# Patient Record
Sex: FEMALE | Race: WHITE | NOT HISPANIC OR LATINO | Employment: OTHER | URBAN - METROPOLITAN AREA
[De-identification: names, ages, dates, MRNs, and addresses within clinical notes are randomized per-mention and may not be internally consistent; named-entity substitution may affect disease eponyms.]

---

## 2017-12-07 ENCOUNTER — OFFICE VISIT - HEALTHEAST (OUTPATIENT)
Dept: FAMILY MEDICINE | Facility: CLINIC | Age: 57
End: 2017-12-07

## 2017-12-07 DIAGNOSIS — G89.29 OTHER CHRONIC PAIN: ICD-10-CM

## 2017-12-07 DIAGNOSIS — R30.0 DYSURIA: ICD-10-CM

## 2017-12-07 DIAGNOSIS — E16.2 HYPOGLYCEMIA: ICD-10-CM

## 2017-12-07 DIAGNOSIS — N76.0 ACUTE VAGINITIS: ICD-10-CM

## 2017-12-07 DIAGNOSIS — R81 GLUCOSURIA: ICD-10-CM

## 2017-12-07 DIAGNOSIS — F22 DELUSIONAL DISORDER (H): ICD-10-CM

## 2017-12-07 DIAGNOSIS — Z79.899 MEDICATION MANAGEMENT: ICD-10-CM

## 2017-12-07 DIAGNOSIS — E11.9 TYPE 2 DIABETES MELLITUS (H): ICD-10-CM

## 2017-12-07 DIAGNOSIS — Z13.220 LIPID SCREENING: ICD-10-CM

## 2017-12-07 DIAGNOSIS — B37.31 YEAST VAGINITIS: ICD-10-CM

## 2017-12-07 LAB
CHOLEST SERPL-MCNC: 306 MG/DL
FASTING STATUS PATIENT QL REPORTED: ABNORMAL
HBA1C MFR BLD: 12.2 % (ref 3.5–6)
HDLC SERPL-MCNC: 40 MG/DL
LDLC SERPL CALC-MCNC: 222 MG/DL
LDLC SERPL CALC-MCNC: ABNORMAL MG/DL
TRIGL SERPL-MCNC: 416 MG/DL

## 2017-12-07 ASSESSMENT — MIFFLIN-ST. JEOR: SCORE: 1606

## 2017-12-17 ENCOUNTER — AMBULATORY - HEALTHEAST (OUTPATIENT)
Dept: FAMILY MEDICINE | Facility: CLINIC | Age: 57
End: 2017-12-17

## 2017-12-17 DIAGNOSIS — E11.9 TYPE 2 DIABETES MELLITUS (H): ICD-10-CM

## 2017-12-17 DIAGNOSIS — E78.2 MIXED HYPERLIPIDEMIA: ICD-10-CM

## 2018-01-02 ENCOUNTER — AMBULATORY - HEALTHEAST (OUTPATIENT)
Dept: EDUCATION SERVICES | Facility: CLINIC | Age: 58
End: 2018-01-02

## 2018-01-02 DIAGNOSIS — E11.65 CONTROLLED TYPE 2 DIABETES MELLITUS WITH HYPERGLYCEMIA, WITHOUT LONG-TERM CURRENT USE OF INSULIN (H): ICD-10-CM

## 2018-02-06 ENCOUNTER — AMBULATORY - HEALTHEAST (OUTPATIENT)
Dept: EDUCATION SERVICES | Facility: CLINIC | Age: 58
End: 2018-02-06

## 2018-02-06 DIAGNOSIS — E11.65 UNCONTROLLED TYPE 2 DIABETES MELLITUS WITH HYPERGLYCEMIA, WITHOUT LONG-TERM CURRENT USE OF INSULIN (H): ICD-10-CM

## 2018-02-08 ENCOUNTER — COMMUNICATION - HEALTHEAST (OUTPATIENT)
Dept: FAMILY MEDICINE | Facility: CLINIC | Age: 58
End: 2018-02-08

## 2018-02-09 ENCOUNTER — COMMUNICATION - HEALTHEAST (OUTPATIENT)
Dept: SCHEDULING | Facility: CLINIC | Age: 58
End: 2018-02-09

## 2018-02-17 ENCOUNTER — COMMUNICATION - HEALTHEAST (OUTPATIENT)
Dept: FAMILY MEDICINE | Facility: CLINIC | Age: 58
End: 2018-02-17

## 2018-02-17 DIAGNOSIS — E11.9 TYPE 2 DIABETES MELLITUS (H): ICD-10-CM

## 2018-03-13 ENCOUNTER — OFFICE VISIT - HEALTHEAST (OUTPATIENT)
Dept: FAMILY MEDICINE | Facility: CLINIC | Age: 58
End: 2018-03-13

## 2018-03-13 ENCOUNTER — COMMUNICATION - HEALTHEAST (OUTPATIENT)
Dept: FAMILY MEDICINE | Facility: CLINIC | Age: 58
End: 2018-03-13

## 2018-03-13 DIAGNOSIS — E11.9 TYPE 2 DIABETES MELLITUS (H): ICD-10-CM

## 2018-03-13 DIAGNOSIS — F22 DELUSIONAL DISORDER (H): ICD-10-CM

## 2018-03-13 DIAGNOSIS — G89.4 CHRONIC PAIN SYNDROME: ICD-10-CM

## 2018-03-13 LAB
ALBUMIN SERPL-MCNC: 4.2 G/DL (ref 3.5–5)
ALP SERPL-CCNC: 74 U/L (ref 45–120)
ALT SERPL W P-5'-P-CCNC: 15 U/L (ref 0–45)
ANION GAP SERPL CALCULATED.3IONS-SCNC: 14 MMOL/L (ref 5–18)
AST SERPL W P-5'-P-CCNC: 15 U/L (ref 0–40)
BILIRUB SERPL-MCNC: 0.4 MG/DL (ref 0–1)
BUN SERPL-MCNC: 14 MG/DL (ref 8–22)
CALCIUM SERPL-MCNC: 10.2 MG/DL (ref 8.5–10.5)
CHLORIDE BLD-SCNC: 105 MMOL/L (ref 98–107)
CO2 SERPL-SCNC: 21 MMOL/L (ref 22–31)
CREAT SERPL-MCNC: 0.75 MG/DL (ref 0.6–1.1)
GFR SERPL CREATININE-BSD FRML MDRD: >60 ML/MIN/1.73M2
GLUCOSE BLD-MCNC: 151 MG/DL (ref 70–125)
HBA1C MFR BLD: 8.4 % (ref 3.5–6)
POTASSIUM BLD-SCNC: 4.7 MMOL/L (ref 3.5–5)
PROT SERPL-MCNC: 7.5 G/DL (ref 6–8)
SODIUM SERPL-SCNC: 140 MMOL/L (ref 136–145)

## 2018-03-13 ASSESSMENT — MIFFLIN-ST. JEOR: SCORE: 1569.71

## 2018-03-16 ENCOUNTER — COMMUNICATION - HEALTHEAST (OUTPATIENT)
Dept: FAMILY MEDICINE | Facility: CLINIC | Age: 58
End: 2018-03-16

## 2018-03-16 DIAGNOSIS — E11.9 TYPE 2 DIABETES MELLITUS (H): ICD-10-CM

## 2018-04-13 ENCOUNTER — RECORDS - HEALTHEAST (OUTPATIENT)
Dept: ADMINISTRATIVE | Facility: OTHER | Age: 58
End: 2018-04-13

## 2018-05-17 ENCOUNTER — COMMUNICATION - HEALTHEAST (OUTPATIENT)
Dept: FAMILY MEDICINE | Facility: CLINIC | Age: 58
End: 2018-05-17

## 2018-05-19 ENCOUNTER — COMMUNICATION - HEALTHEAST (OUTPATIENT)
Dept: FAMILY MEDICINE | Facility: CLINIC | Age: 58
End: 2018-05-19

## 2018-05-19 DIAGNOSIS — E11.9 TYPE 2 DIABETES MELLITUS (H): ICD-10-CM

## 2018-07-05 ENCOUNTER — COMMUNICATION - HEALTHEAST (OUTPATIENT)
Dept: FAMILY MEDICINE | Facility: CLINIC | Age: 58
End: 2018-07-05

## 2018-07-27 ENCOUNTER — COMMUNICATION - HEALTHEAST (OUTPATIENT)
Dept: FAMILY MEDICINE | Facility: CLINIC | Age: 58
End: 2018-07-27

## 2018-07-27 DIAGNOSIS — E11.9 TYPE 2 DIABETES MELLITUS (H): ICD-10-CM

## 2018-09-06 ENCOUNTER — OFFICE VISIT - HEALTHEAST (OUTPATIENT)
Dept: FAMILY MEDICINE | Facility: CLINIC | Age: 58
End: 2018-09-06

## 2018-09-06 ENCOUNTER — COMMUNICATION - HEALTHEAST (OUTPATIENT)
Dept: FAMILY MEDICINE | Facility: CLINIC | Age: 58
End: 2018-09-06

## 2018-09-06 DIAGNOSIS — Z12.11 COLON CANCER SCREENING: ICD-10-CM

## 2018-09-06 DIAGNOSIS — E66.01 MORBID OBESITY (H): ICD-10-CM

## 2018-09-06 DIAGNOSIS — E78.2 MIXED HYPERLIPIDEMIA: ICD-10-CM

## 2018-09-06 DIAGNOSIS — E11.9 DIABETES MELLITUS, TYPE 2 (H): ICD-10-CM

## 2018-09-06 DIAGNOSIS — Z12.31 VISIT FOR SCREENING MAMMOGRAM: ICD-10-CM

## 2018-09-06 LAB — HBA1C MFR BLD: 8 % (ref 3.5–6)

## 2018-09-06 ASSESSMENT — MIFFLIN-ST. JEOR: SCORE: 1570.62

## 2018-09-07 ENCOUNTER — COMMUNICATION - HEALTHEAST (OUTPATIENT)
Dept: FAMILY MEDICINE | Facility: CLINIC | Age: 58
End: 2018-09-07

## 2018-09-10 ENCOUNTER — AMBULATORY - HEALTHEAST (OUTPATIENT)
Dept: FAMILY MEDICINE | Facility: CLINIC | Age: 58
End: 2018-09-10

## 2018-09-10 ENCOUNTER — COMMUNICATION - HEALTHEAST (OUTPATIENT)
Dept: EDUCATION SERVICES | Facility: CLINIC | Age: 58
End: 2018-09-10

## 2018-10-02 ENCOUNTER — COMMUNICATION - HEALTHEAST (OUTPATIENT)
Dept: FAMILY MEDICINE | Facility: CLINIC | Age: 58
End: 2018-10-02

## 2018-10-12 ENCOUNTER — COMMUNICATION - HEALTHEAST (OUTPATIENT)
Dept: FAMILY MEDICINE | Facility: CLINIC | Age: 58
End: 2018-10-12

## 2018-10-23 ENCOUNTER — COMMUNICATION - HEALTHEAST (OUTPATIENT)
Dept: FAMILY MEDICINE | Facility: CLINIC | Age: 58
End: 2018-10-23

## 2018-10-30 ENCOUNTER — AMBULATORY - HEALTHEAST (OUTPATIENT)
Dept: EDUCATION SERVICES | Facility: CLINIC | Age: 58
End: 2018-10-30

## 2018-10-30 DIAGNOSIS — E11.65 UNCONTROLLED TYPE 2 DIABETES MELLITUS WITH HYPERGLYCEMIA, WITHOUT LONG-TERM CURRENT USE OF INSULIN (H): ICD-10-CM

## 2018-12-06 ENCOUNTER — COMMUNICATION - HEALTHEAST (OUTPATIENT)
Dept: FAMILY MEDICINE | Facility: CLINIC | Age: 58
End: 2018-12-06

## 2018-12-06 DIAGNOSIS — E11.9 DIABETES MELLITUS, TYPE 2 (H): ICD-10-CM

## 2018-12-12 ENCOUNTER — OFFICE VISIT - HEALTHEAST (OUTPATIENT)
Dept: FAMILY MEDICINE | Facility: CLINIC | Age: 58
End: 2018-12-12

## 2018-12-12 DIAGNOSIS — E66.01 MORBID OBESITY (H): ICD-10-CM

## 2018-12-12 DIAGNOSIS — G89.4 CHRONIC PAIN SYNDROME: ICD-10-CM

## 2018-12-12 DIAGNOSIS — E11.9 TYPE 2 DIABETES MELLITUS (H): ICD-10-CM

## 2018-12-12 LAB — HBA1C MFR BLD: 7.6 % (ref 3.5–6)

## 2018-12-12 ASSESSMENT — MIFFLIN-ST. JEOR: SCORE: 1578.78

## 2018-12-13 LAB
ALBUMIN SERPL-MCNC: 4.8 G/DL (ref 3.5–5)
ALP SERPL-CCNC: 72 U/L (ref 45–120)
ALT SERPL W P-5'-P-CCNC: 15 U/L (ref 0–45)
ANION GAP SERPL CALCULATED.3IONS-SCNC: 13 MMOL/L (ref 5–18)
AST SERPL W P-5'-P-CCNC: 17 U/L (ref 0–40)
BILIRUB SERPL-MCNC: 0.3 MG/DL (ref 0–1)
BUN SERPL-MCNC: 14 MG/DL (ref 8–22)
CALCIUM SERPL-MCNC: 10.8 MG/DL (ref 8.5–10.5)
CHLORIDE BLD-SCNC: 100 MMOL/L (ref 98–107)
CO2 SERPL-SCNC: 22 MMOL/L (ref 22–31)
CREAT SERPL-MCNC: 0.84 MG/DL (ref 0.6–1.1)
GFR SERPL CREATININE-BSD FRML MDRD: >60 ML/MIN/1.73M2
GLUCOSE BLD-MCNC: 132 MG/DL (ref 70–125)
LDLC SERPL CALC-MCNC: 204 MG/DL
POTASSIUM BLD-SCNC: 5.3 MMOL/L (ref 3.5–5)
PROT SERPL-MCNC: 7.9 G/DL (ref 6–8)
SODIUM SERPL-SCNC: 135 MMOL/L (ref 136–145)

## 2018-12-20 ENCOUNTER — AMBULATORY - HEALTHEAST (OUTPATIENT)
Dept: FAMILY MEDICINE | Facility: CLINIC | Age: 58
End: 2018-12-20

## 2018-12-20 DIAGNOSIS — E11.9 TYPE 2 DIABETES MELLITUS (H): ICD-10-CM

## 2019-01-09 ENCOUNTER — COMMUNICATION - HEALTHEAST (OUTPATIENT)
Dept: FAMILY MEDICINE | Facility: CLINIC | Age: 59
End: 2019-01-09

## 2019-04-08 ENCOUNTER — COMMUNICATION - HEALTHEAST (OUTPATIENT)
Dept: FAMILY MEDICINE | Facility: CLINIC | Age: 59
End: 2019-04-08

## 2019-04-08 DIAGNOSIS — M47.816 SPONDYLOSIS OF LUMBAR REGION WITHOUT MYELOPATHY OR RADICULOPATHY: ICD-10-CM

## 2019-04-21 ENCOUNTER — COMMUNICATION - HEALTHEAST (OUTPATIENT)
Dept: FAMILY MEDICINE | Facility: CLINIC | Age: 59
End: 2019-04-21

## 2019-04-21 DIAGNOSIS — M47.816 SPONDYLOSIS OF LUMBAR REGION WITHOUT MYELOPATHY OR RADICULOPATHY: ICD-10-CM

## 2019-05-23 ENCOUNTER — COMMUNICATION - HEALTHEAST (OUTPATIENT)
Dept: FAMILY MEDICINE | Facility: CLINIC | Age: 59
End: 2019-05-23

## 2019-06-04 ENCOUNTER — COMMUNICATION - HEALTHEAST (OUTPATIENT)
Dept: FAMILY MEDICINE | Facility: CLINIC | Age: 59
End: 2019-06-04

## 2019-06-04 DIAGNOSIS — E11.9 TYPE 2 DIABETES MELLITUS (H): ICD-10-CM

## 2019-06-26 ENCOUNTER — OFFICE VISIT - HEALTHEAST (OUTPATIENT)
Dept: FAMILY MEDICINE | Facility: CLINIC | Age: 59
End: 2019-06-26

## 2019-06-26 DIAGNOSIS — F22 DELUSIONAL DISORDER (H): ICD-10-CM

## 2019-06-26 DIAGNOSIS — E78.2 MIXED HYPERLIPIDEMIA: ICD-10-CM

## 2019-06-26 DIAGNOSIS — R51.9 CHRONIC DAILY HEADACHE: ICD-10-CM

## 2019-06-26 DIAGNOSIS — G89.4 CHRONIC PAIN SYNDROME: ICD-10-CM

## 2019-06-26 DIAGNOSIS — E11.8 TYPE 2 DIABETES MELLITUS WITH COMPLICATION, WITHOUT LONG-TERM CURRENT USE OF INSULIN (H): ICD-10-CM

## 2019-06-26 DIAGNOSIS — E66.01 MORBID OBESITY (H): ICD-10-CM

## 2019-06-26 LAB
ANION GAP SERPL CALCULATED.3IONS-SCNC: 12 MMOL/L (ref 5–18)
BUN SERPL-MCNC: 17 MG/DL (ref 8–22)
CALCIUM SERPL-MCNC: 10.6 MG/DL (ref 8.5–10.5)
CHLORIDE BLD-SCNC: 104 MMOL/L (ref 98–107)
CO2 SERPL-SCNC: 21 MMOL/L (ref 22–31)
CREAT SERPL-MCNC: 0.82 MG/DL (ref 0.6–1.1)
CREAT UR-MCNC: 97.4 MG/DL
GFR SERPL CREATININE-BSD FRML MDRD: >60 ML/MIN/1.73M2
GLUCOSE BLD-MCNC: 171 MG/DL (ref 70–125)
HBA1C MFR BLD: 8 % (ref 3.5–6)
LDLC SERPL CALC-MCNC: 214 MG/DL
MICROALBUMIN UR-MCNC: 9.01 MG/DL (ref 0–1.99)
MICROALBUMIN/CREAT UR: 92.5 MG/G
POTASSIUM BLD-SCNC: 4.9 MMOL/L (ref 3.5–5)
SODIUM SERPL-SCNC: 137 MMOL/L (ref 136–145)

## 2019-06-26 ASSESSMENT — MIFFLIN-ST. JEOR: SCORE: 1558.82

## 2019-06-30 ENCOUNTER — AMBULATORY - HEALTHEAST (OUTPATIENT)
Dept: FAMILY MEDICINE | Facility: CLINIC | Age: 59
End: 2019-06-30

## 2019-06-30 DIAGNOSIS — E11.9 TYPE 2 DIABETES MELLITUS (H): ICD-10-CM

## 2019-07-01 ENCOUNTER — COMMUNICATION - HEALTHEAST (OUTPATIENT)
Dept: FAMILY MEDICINE | Facility: CLINIC | Age: 59
End: 2019-07-01

## 2019-09-26 ENCOUNTER — COMMUNICATION - HEALTHEAST (OUTPATIENT)
Dept: FAMILY MEDICINE | Facility: CLINIC | Age: 59
End: 2019-09-26

## 2019-10-30 ASSESSMENT — MIFFLIN-ST. JEOR: SCORE: 1587.4

## 2019-10-31 ENCOUNTER — SURGERY - HEALTHEAST (OUTPATIENT)
Dept: CARDIOLOGY | Facility: CLINIC | Age: 59
End: 2019-10-31

## 2019-10-31 ASSESSMENT — MIFFLIN-ST. JEOR: SCORE: 1537.7

## 2019-11-01 ASSESSMENT — MIFFLIN-ST. JEOR: SCORE: 1544.31

## 2019-11-04 ENCOUNTER — COMMUNICATION - HEALTHEAST (OUTPATIENT)
Dept: CARE COORDINATION | Facility: CLINIC | Age: 59
End: 2019-11-04

## 2019-11-04 ENCOUNTER — OFFICE VISIT - HEALTHEAST (OUTPATIENT)
Dept: FAMILY MEDICINE | Facility: CLINIC | Age: 59
End: 2019-11-04

## 2019-11-04 DIAGNOSIS — E78.2 MIXED HYPERLIPIDEMIA: ICD-10-CM

## 2019-11-04 DIAGNOSIS — E11.8 TYPE 2 DIABETES MELLITUS WITH COMPLICATION, WITHOUT LONG-TERM CURRENT USE OF INSULIN (H): ICD-10-CM

## 2019-11-04 DIAGNOSIS — Z72.0 TOBACCO USE: ICD-10-CM

## 2019-11-04 DIAGNOSIS — I50.9 ACUTE CONGESTIVE HEART FAILURE, UNSPECIFIED HEART FAILURE TYPE (H): ICD-10-CM

## 2019-11-04 DIAGNOSIS — I21.4 NSTEMI (NON-ST ELEVATED MYOCARDIAL INFARCTION) (H): ICD-10-CM

## 2019-11-04 DIAGNOSIS — Z09 HOSPITAL DISCHARGE FOLLOW-UP: ICD-10-CM

## 2019-11-07 ENCOUNTER — COMMUNICATION - HEALTHEAST (OUTPATIENT)
Dept: FAMILY MEDICINE | Facility: CLINIC | Age: 59
End: 2019-11-07

## 2019-11-07 DIAGNOSIS — M47.816 SPONDYLOSIS OF LUMBAR REGION WITHOUT MYELOPATHY OR RADICULOPATHY: ICD-10-CM

## 2019-11-14 ENCOUNTER — OFFICE VISIT - HEALTHEAST (OUTPATIENT)
Dept: CARDIOLOGY | Facility: CLINIC | Age: 59
End: 2019-11-14

## 2019-11-14 DIAGNOSIS — I25.5 ISCHEMIC CARDIOMYOPATHY: ICD-10-CM

## 2019-11-14 DIAGNOSIS — I21.4 NON-STEMI (NON-ST ELEVATED MYOCARDIAL INFARCTION) (H): ICD-10-CM

## 2019-11-14 DIAGNOSIS — E78.2 MIXED HYPERLIPIDEMIA: ICD-10-CM

## 2019-11-14 DIAGNOSIS — Z72.0 TOBACCO USE: ICD-10-CM

## 2019-11-14 LAB
ANION GAP SERPL CALCULATED.3IONS-SCNC: 11 MMOL/L (ref 5–18)
BUN SERPL-MCNC: 23 MG/DL (ref 8–22)
CALCIUM SERPL-MCNC: 10.1 MG/DL (ref 8.5–10.5)
CHLORIDE BLD-SCNC: 103 MMOL/L (ref 98–107)
CO2 SERPL-SCNC: 24 MMOL/L (ref 22–31)
CREAT SERPL-MCNC: 0.96 MG/DL (ref 0.6–1.1)
GFR SERPL CREATININE-BSD FRML MDRD: 60 ML/MIN/1.73M2
GLUCOSE BLD-MCNC: 196 MG/DL (ref 70–125)
POTASSIUM BLD-SCNC: 4.3 MMOL/L (ref 3.5–5)
SODIUM SERPL-SCNC: 138 MMOL/L (ref 136–145)

## 2019-11-14 RX ORDER — NITROGLYCERIN 0.4 MG/1
0.4 TABLET SUBLINGUAL EVERY 5 MIN PRN
Qty: 25 TABLET | Refills: 1 | Status: SHIPPED | OUTPATIENT
Start: 2019-11-14 | End: 2021-09-30

## 2019-11-14 ASSESSMENT — MIFFLIN-ST. JEOR: SCORE: 1528.43

## 2019-11-29 ENCOUNTER — COMMUNICATION - HEALTHEAST (OUTPATIENT)
Dept: FAMILY MEDICINE | Facility: CLINIC | Age: 59
End: 2019-11-29

## 2019-11-29 DIAGNOSIS — E11.8 TYPE 2 DIABETES MELLITUS WITH COMPLICATION, WITHOUT LONG-TERM CURRENT USE OF INSULIN (H): ICD-10-CM

## 2019-12-16 ENCOUNTER — RECORDS - HEALTHEAST (OUTPATIENT)
Dept: ADMINISTRATIVE | Facility: OTHER | Age: 59
End: 2019-12-16

## 2019-12-16 ENCOUNTER — COMMUNICATION - HEALTHEAST (OUTPATIENT)
Dept: CARDIOLOGY | Facility: CLINIC | Age: 59
End: 2019-12-16

## 2019-12-20 ENCOUNTER — COMMUNICATION - HEALTHEAST (OUTPATIENT)
Dept: FAMILY MEDICINE | Facility: CLINIC | Age: 59
End: 2019-12-20

## 2019-12-20 DIAGNOSIS — E11.9 TYPE 2 DIABETES MELLITUS (H): ICD-10-CM

## 2020-01-06 ENCOUNTER — OFFICE VISIT - HEALTHEAST (OUTPATIENT)
Dept: CARDIOLOGY | Facility: CLINIC | Age: 60
End: 2020-01-06

## 2020-01-06 DIAGNOSIS — I21.4 NON-STEMI (NON-ST ELEVATED MYOCARDIAL INFARCTION) (H): ICD-10-CM

## 2020-01-06 DIAGNOSIS — I25.5 ISCHEMIC CARDIOMYOPATHY: ICD-10-CM

## 2020-01-06 DIAGNOSIS — I25.10 CAD (CORONARY ARTERY DISEASE): ICD-10-CM

## 2020-01-06 DIAGNOSIS — Z72.0 TOBACCO USE: ICD-10-CM

## 2020-01-06 LAB
ATRIAL RATE - MUSE: 79 BPM
DIASTOLIC BLOOD PRESSURE - MUSE: NORMAL
INTERPRETATION ECG - MUSE: NORMAL
P AXIS - MUSE: 70 DEGREES
PR INTERVAL - MUSE: 130 MS
QRS DURATION - MUSE: 100 MS
QT - MUSE: 388 MS
QTC - MUSE: 444 MS
R AXIS - MUSE: -15 DEGREES
SYSTOLIC BLOOD PRESSURE - MUSE: NORMAL
T AXIS - MUSE: 24 DEGREES
VENTRICULAR RATE- MUSE: 79 BPM

## 2020-01-06 ASSESSMENT — MIFFLIN-ST. JEOR: SCORE: 1503.49

## 2020-01-13 ENCOUNTER — COMMUNICATION - HEALTHEAST (OUTPATIENT)
Dept: CARDIOLOGY | Facility: CLINIC | Age: 60
End: 2020-01-13

## 2020-01-17 ENCOUNTER — AMBULATORY - HEALTHEAST (OUTPATIENT)
Dept: CARDIOLOGY | Facility: CLINIC | Age: 60
End: 2020-01-17

## 2020-01-17 ENCOUNTER — HOSPITAL ENCOUNTER (OUTPATIENT)
Dept: CARDIOLOGY | Facility: CLINIC | Age: 60
Discharge: HOME | End: 2020-01-17
Attending: INTERNAL MEDICINE

## 2020-01-17 DIAGNOSIS — I25.10 CAD (CORONARY ARTERY DISEASE): ICD-10-CM

## 2020-01-17 LAB
ALBUMIN SERPL-MCNC: 4.4 G/DL (ref 3.5–5)
ALP SERPL-CCNC: 157 U/L (ref 45–120)
ALT SERPL W P-5'-P-CCNC: 63 U/L (ref 0–45)
ANION GAP SERPL CALCULATED.3IONS-SCNC: 13 MMOL/L (ref 5–18)
AST SERPL W P-5'-P-CCNC: 40 U/L (ref 0–40)
BILIRUB SERPL-MCNC: 0.5 MG/DL (ref 0–1)
BUN SERPL-MCNC: 21 MG/DL (ref 8–22)
CALCIUM SERPL-MCNC: 10.2 MG/DL (ref 8.5–10.5)
CHLORIDE BLD-SCNC: 100 MMOL/L (ref 98–107)
CHOLEST SERPL-MCNC: 131 MG/DL
CO2 SERPL-SCNC: 22 MMOL/L (ref 22–31)
CREAT SERPL-MCNC: 0.82 MG/DL (ref 0.6–1.1)
FASTING STATUS PATIENT QL REPORTED: YES
GFR SERPL CREATININE-BSD FRML MDRD: >60 ML/MIN/1.73M2
GLUCOSE BLD-MCNC: 147 MG/DL (ref 70–125)
HDLC SERPL-MCNC: 33 MG/DL
LDLC SERPL CALC-MCNC: 75 MG/DL
POTASSIUM BLD-SCNC: 3.5 MMOL/L (ref 3.5–5)
PROT SERPL-MCNC: 7.6 G/DL (ref 6–8)
SODIUM SERPL-SCNC: 135 MMOL/L (ref 136–145)
TRIGL SERPL-MCNC: 117 MG/DL

## 2020-01-17 ASSESSMENT — MIFFLIN-ST. JEOR: SCORE: 1501.22

## 2020-01-20 ENCOUNTER — AMBULATORY - HEALTHEAST (OUTPATIENT)
Dept: CARDIOLOGY | Facility: CLINIC | Age: 60
End: 2020-01-20

## 2020-01-20 DIAGNOSIS — E78.2 MIXED HYPERLIPIDEMIA: ICD-10-CM

## 2020-01-20 LAB
AORTIC ROOT: 3.2 CM
AORTIC VALVE MEAN VELOCITY: 86.1 CM/S
ASCENDING AORTA: 3.4 CM
AV DIMENSIONLESS INDEX VTI: 1
AV MEAN GRADIENT: 4 MMHG
AV PEAK GRADIENT: 7 MMHG
AV VALVE AREA: 3.5 CM2
AV VELOCITY RATIO: 0.9
BSA FOR ECHO PROCEDURE: 2.06 M2
CV BLOOD PRESSURE: ABNORMAL MMHG
CV ECHO HEIGHT: 65 IN
CV ECHO WEIGHT: 204 LBS
DOP CALC AO PEAK VEL: 132 CM/S
DOP CALC AO VTI: 19.1 CM
DOP CALC LVOT AREA: 3.46 CM2
DOP CALC LVOT DIAMETER: 2.1 CM
DOP CALC LVOT PEAK VEL: 115 CM/S
DOP CALC LVOT STROKE VOLUME: 66.5 CM3
DOP CALCLVOT PEAK VEL VTI: 19.2 CM
EJECTION FRACTION: 49 % (ref 55–75)
FRACTIONAL SHORTENING: 24.5 % (ref 28–44)
INTERVENTRICULAR SEPTUM IN END DIASTOLE: 1 CM (ref 0.6–0.9)
IVS/PW RATIO: 1
LA AREA 1: 18.2 CM2
LA AREA 2: 18.4 CM2
LEFT ATRIUM LENGTH: 4.97 CM
LEFT ATRIUM SIZE: 4.2 CM
LEFT ATRIUM TO AORTIC ROOT RATIO: 1.31 NO UNITS
LEFT ATRIUM VOLUME INDEX: 27.8 ML/M2
LEFT ATRIUM VOLUME: 57.3 ML
LEFT VENTRICLE CARDIAC INDEX: 2.3 L/MIN/M2
LEFT VENTRICLE CARDIAC OUTPUT: 4.7 L/MIN
LEFT VENTRICLE DIASTOLIC VOLUME INDEX: 43.7 CM3/M2 (ref 29–61)
LEFT VENTRICLE DIASTOLIC VOLUME: 90 CM3 (ref 46–106)
LEFT VENTRICLE HEART RATE: 70 BPM
LEFT VENTRICLE MASS INDEX: 80.7 G/M2
LEFT VENTRICLE SYSTOLIC VOLUME INDEX: 22.3 CM3/M2 (ref 8–24)
LEFT VENTRICLE SYSTOLIC VOLUME: 46 CM3 (ref 14–42)
LEFT VENTRICULAR INTERNAL DIMENSION IN DIASTOLE: 4.73 CM (ref 3.8–5.2)
LEFT VENTRICULAR INTERNAL DIMENSION IN SYSTOLE: 3.57 CM (ref 2.2–3.5)
LEFT VENTRICULAR MASS: 166.2 G
LEFT VENTRICULAR OUTFLOW TRACT MEAN GRADIENT: 3 MMHG
LEFT VENTRICULAR OUTFLOW TRACT MEAN VELOCITY: 74.7 CM/S
LEFT VENTRICULAR OUTFLOW TRACT PEAK GRADIENT: 5 MMHG
LEFT VENTRICULAR POSTERIOR WALL IN END DIASTOLE: 1 CM (ref 0.6–0.9)
LV STROKE VOLUME INDEX: 32.3 ML/M2
MITRAL VALVE E/A RATIO: 1
MV AVERAGE E/E' RATIO: 9.1 CM/S
MV DECELERATION TIME: 190 MS
MV E'TISSUE VEL-LAT: 9.26 CM/S
MV E'TISSUE VEL-MED: 6.34 CM/S
MV LATERAL E/E' RATIO: 7.7
MV MEDIAL E/E' RATIO: 11.2
MV PEAK A VELOCITY: 69.7 CM/S
MV PEAK E VELOCITY: 71 CM/S
NUC REST DIASTOLIC VOLUME INDEX: 3264 LBS
NUC REST SYSTOLIC VOLUME INDEX: 65 IN
TRICUSPID VALVE ANULAR PLANE SYSTOLIC EXCURSION: 2.1 CM

## 2020-02-17 ENCOUNTER — COMMUNICATION - HEALTHEAST (OUTPATIENT)
Dept: CARDIOLOGY | Facility: CLINIC | Age: 60
End: 2020-02-17

## 2020-02-17 DIAGNOSIS — I21.4 NON-STEMI (NON-ST ELEVATED MYOCARDIAL INFARCTION) (H): ICD-10-CM

## 2020-02-17 DIAGNOSIS — I25.5 ISCHEMIC CARDIOMYOPATHY: ICD-10-CM

## 2020-02-18 ENCOUNTER — AMBULATORY - HEALTHEAST (OUTPATIENT)
Dept: LAB | Facility: CLINIC | Age: 60
End: 2020-02-18

## 2020-02-18 DIAGNOSIS — E78.2 MIXED HYPERLIPIDEMIA: ICD-10-CM

## 2020-02-18 LAB
ALT SERPL W P-5'-P-CCNC: 45 U/L (ref 0–45)
AST SERPL W P-5'-P-CCNC: 34 U/L (ref 0–40)

## 2020-02-19 ENCOUNTER — COMMUNICATION - HEALTHEAST (OUTPATIENT)
Dept: CARDIOLOGY | Facility: CLINIC | Age: 60
End: 2020-02-19

## 2020-02-19 DIAGNOSIS — I25.5 ISCHEMIC CARDIOMYOPATHY: ICD-10-CM

## 2020-02-20 ENCOUNTER — AMBULATORY - HEALTHEAST (OUTPATIENT)
Dept: CARDIOLOGY | Facility: CLINIC | Age: 60
End: 2020-02-20

## 2020-02-20 ENCOUNTER — COMMUNICATION - HEALTHEAST (OUTPATIENT)
Dept: CARDIOLOGY | Facility: CLINIC | Age: 60
End: 2020-02-20

## 2020-02-20 DIAGNOSIS — I21.4 NON-STEMI (NON-ST ELEVATED MYOCARDIAL INFARCTION) (H): ICD-10-CM

## 2020-02-20 DIAGNOSIS — I25.5 ISCHEMIC CARDIOMYOPATHY: ICD-10-CM

## 2020-02-21 ENCOUNTER — COMMUNICATION - HEALTHEAST (OUTPATIENT)
Dept: FAMILY MEDICINE | Facility: CLINIC | Age: 60
End: 2020-02-21

## 2020-03-02 ENCOUNTER — COMMUNICATION - HEALTHEAST (OUTPATIENT)
Dept: FAMILY MEDICINE | Facility: CLINIC | Age: 60
End: 2020-03-02

## 2020-03-04 ENCOUNTER — COMMUNICATION - HEALTHEAST (OUTPATIENT)
Dept: FAMILY MEDICINE | Facility: CLINIC | Age: 60
End: 2020-03-04

## 2020-03-04 DIAGNOSIS — E11.9 TYPE 2 DIABETES MELLITUS (H): ICD-10-CM

## 2020-03-18 ENCOUNTER — COMMUNICATION - HEALTHEAST (OUTPATIENT)
Dept: FAMILY MEDICINE | Facility: CLINIC | Age: 60
End: 2020-03-18

## 2020-03-18 DIAGNOSIS — E11.9 TYPE 2 DIABETES MELLITUS (H): ICD-10-CM

## 2020-04-21 ENCOUNTER — OFFICE VISIT - HEALTHEAST (OUTPATIENT)
Dept: CARDIOLOGY | Facility: CLINIC | Age: 60
End: 2020-04-21

## 2020-04-21 DIAGNOSIS — I25.10 CORONARY ARTERY DISEASE INVOLVING NATIVE CORONARY ARTERY OF NATIVE HEART WITHOUT ANGINA PECTORIS: ICD-10-CM

## 2020-04-21 DIAGNOSIS — I25.5 ISCHEMIC CARDIOMYOPATHY: ICD-10-CM

## 2020-04-21 DIAGNOSIS — R06.09 DYSPNEA ON EXERTION: ICD-10-CM

## 2020-04-21 DIAGNOSIS — E78.2 MIXED HYPERLIPIDEMIA: ICD-10-CM

## 2020-04-21 RX ORDER — ACETAMINOPHEN 500 MG
500 TABLET ORAL PRN
Status: SHIPPED | COMMUNITY
Start: 2020-04-21

## 2020-04-22 ENCOUNTER — COMMUNICATION - HEALTHEAST (OUTPATIENT)
Dept: CARDIOLOGY | Facility: CLINIC | Age: 60
End: 2020-04-22

## 2020-04-23 ENCOUNTER — COMMUNICATION - HEALTHEAST (OUTPATIENT)
Dept: CARDIOLOGY | Facility: CLINIC | Age: 60
End: 2020-04-23

## 2020-04-23 ENCOUNTER — OFFICE VISIT - HEALTHEAST (OUTPATIENT)
Dept: FAMILY MEDICINE | Facility: CLINIC | Age: 60
End: 2020-04-23

## 2020-04-23 DIAGNOSIS — Z01.30 BLOOD PRESSURE CHECK: ICD-10-CM

## 2020-04-23 DIAGNOSIS — E11.8 TYPE 2 DIABETES MELLITUS WITH COMPLICATION, WITHOUT LONG-TERM CURRENT USE OF INSULIN (H): ICD-10-CM

## 2020-04-23 DIAGNOSIS — R06.02 SOB (SHORTNESS OF BREATH): ICD-10-CM

## 2020-04-23 DIAGNOSIS — R06.02 SHORTNESS OF BREATH: ICD-10-CM

## 2020-04-23 DIAGNOSIS — R06.2 WHEEZING: ICD-10-CM

## 2020-04-23 DIAGNOSIS — I25.5 ISCHEMIC CARDIOMYOPATHY: ICD-10-CM

## 2020-04-23 LAB
ALBUMIN SERPL-MCNC: 4.3 G/DL (ref 3.5–5)
ALP SERPL-CCNC: 105 U/L (ref 45–120)
ALT SERPL W P-5'-P-CCNC: 47 U/L (ref 0–45)
ANION GAP SERPL CALCULATED.3IONS-SCNC: 12 MMOL/L (ref 5–18)
AST SERPL W P-5'-P-CCNC: 44 U/L (ref 0–40)
BILIRUB SERPL-MCNC: 0.3 MG/DL (ref 0–1)
BNP SERPL-MCNC: 33 PG/ML (ref 0–89)
BUN SERPL-MCNC: 17 MG/DL (ref 8–22)
CALCIUM SERPL-MCNC: 9.6 MG/DL (ref 8.5–10.5)
CHLORIDE BLD-SCNC: 103 MMOL/L (ref 98–107)
CO2 SERPL-SCNC: 21 MMOL/L (ref 22–31)
CREAT SERPL-MCNC: 0.82 MG/DL (ref 0.6–1.1)
GFR SERPL CREATININE-BSD FRML MDRD: >60 ML/MIN/1.73M2
GLUCOSE BLD-MCNC: 149 MG/DL (ref 70–125)
HBA1C MFR BLD: 7.5 % (ref 3.5–6)
POTASSIUM BLD-SCNC: 4.4 MMOL/L (ref 3.5–5)
PROT SERPL-MCNC: 7.1 G/DL (ref 6–8)
SODIUM SERPL-SCNC: 136 MMOL/L (ref 136–145)

## 2020-04-23 RX ORDER — ADHESIVE BANDAGE 3/4"
BANDAGE TOPICAL
Qty: 1 EACH | Refills: 1 | Status: SHIPPED | OUTPATIENT
Start: 2020-04-23 | End: 2023-11-01

## 2020-04-27 ENCOUNTER — COMMUNICATION - HEALTHEAST (OUTPATIENT)
Dept: CARDIOLOGY | Facility: CLINIC | Age: 60
End: 2020-04-27

## 2020-04-27 DIAGNOSIS — I25.5 ISCHEMIC CARDIOMYOPATHY: ICD-10-CM

## 2020-04-27 DIAGNOSIS — E78.2 MIXED HYPERLIPIDEMIA: ICD-10-CM

## 2020-04-27 DIAGNOSIS — I25.10 CORONARY ARTERY DISEASE INVOLVING NATIVE CORONARY ARTERY OF NATIVE HEART WITHOUT ANGINA PECTORIS: ICD-10-CM

## 2020-05-18 ENCOUNTER — COMMUNICATION - HEALTHEAST (OUTPATIENT)
Dept: FAMILY MEDICINE | Facility: CLINIC | Age: 60
End: 2020-05-18

## 2020-05-18 ENCOUNTER — COMMUNICATION - HEALTHEAST (OUTPATIENT)
Dept: CARDIOLOGY | Facility: CLINIC | Age: 60
End: 2020-05-18

## 2020-05-18 DIAGNOSIS — I21.4 NON-STEMI (NON-ST ELEVATED MYOCARDIAL INFARCTION) (H): ICD-10-CM

## 2020-05-18 DIAGNOSIS — E11.8 TYPE 2 DIABETES MELLITUS WITH COMPLICATION, WITHOUT LONG-TERM CURRENT USE OF INSULIN (H): ICD-10-CM

## 2020-05-18 DIAGNOSIS — I25.5 ISCHEMIC CARDIOMYOPATHY: ICD-10-CM

## 2020-06-25 ENCOUNTER — COMMUNICATION - HEALTHEAST (OUTPATIENT)
Dept: FAMILY MEDICINE | Facility: CLINIC | Age: 60
End: 2020-06-25

## 2020-06-25 DIAGNOSIS — I25.5 ISCHEMIC CARDIOMYOPATHY: ICD-10-CM

## 2020-06-25 DIAGNOSIS — I21.4 NON-STEMI (NON-ST ELEVATED MYOCARDIAL INFARCTION) (H): ICD-10-CM

## 2020-06-29 ENCOUNTER — COMMUNICATION - HEALTHEAST (OUTPATIENT)
Dept: FAMILY MEDICINE | Facility: CLINIC | Age: 60
End: 2020-06-29

## 2020-06-29 DIAGNOSIS — E11.9 TYPE 2 DIABETES MELLITUS (H): ICD-10-CM

## 2020-07-02 ENCOUNTER — COMMUNICATION - HEALTHEAST (OUTPATIENT)
Dept: FAMILY MEDICINE | Facility: CLINIC | Age: 60
End: 2020-07-02

## 2020-07-03 ENCOUNTER — AMBULATORY - HEALTHEAST (OUTPATIENT)
Dept: FAMILY MEDICINE | Facility: CLINIC | Age: 60
End: 2020-07-03

## 2020-07-03 DIAGNOSIS — E11.9 TYPE 2 DIABETES MELLITUS (H): ICD-10-CM

## 2020-07-03 RX ORDER — METFORMIN HCL 500 MG
2000 TABLET, EXTENDED RELEASE 24 HR ORAL
Qty: 360 TABLET | Refills: 3 | Status: SHIPPED | OUTPATIENT
Start: 2020-07-03 | End: 2021-07-31

## 2020-07-20 ENCOUNTER — OFFICE VISIT - HEALTHEAST (OUTPATIENT)
Dept: FAMILY MEDICINE | Facility: CLINIC | Age: 60
End: 2020-07-20

## 2020-07-20 DIAGNOSIS — I25.5 ISCHEMIC CARDIOMYOPATHY: ICD-10-CM

## 2020-07-20 DIAGNOSIS — E11.8 TYPE 2 DIABETES MELLITUS WITH COMPLICATION, WITHOUT LONG-TERM CURRENT USE OF INSULIN (H): ICD-10-CM

## 2020-07-20 DIAGNOSIS — G47.33 OSA (OBSTRUCTIVE SLEEP APNEA): ICD-10-CM

## 2020-07-20 DIAGNOSIS — G89.4 CHRONIC PAIN SYNDROME: ICD-10-CM

## 2020-07-20 DIAGNOSIS — R51.9 DAILY HEADACHE: ICD-10-CM

## 2020-07-20 DIAGNOSIS — R06.2 WHEEZING: ICD-10-CM

## 2020-07-30 ENCOUNTER — COMMUNICATION - HEALTHEAST (OUTPATIENT)
Dept: CARDIOLOGY | Facility: CLINIC | Age: 60
End: 2020-07-30

## 2020-08-03 ENCOUNTER — OFFICE VISIT - HEALTHEAST (OUTPATIENT)
Dept: CARDIOLOGY | Facility: CLINIC | Age: 60
End: 2020-08-03

## 2020-08-03 DIAGNOSIS — Z87.891 HISTORY OF TOBACCO ABUSE: ICD-10-CM

## 2020-08-03 DIAGNOSIS — I25.5 ISCHEMIC CARDIOMYOPATHY: ICD-10-CM

## 2020-08-03 DIAGNOSIS — E78.2 MIXED HYPERLIPIDEMIA: ICD-10-CM

## 2020-08-13 ENCOUNTER — COMMUNICATION - HEALTHEAST (OUTPATIENT)
Dept: FAMILY MEDICINE | Facility: CLINIC | Age: 60
End: 2020-08-13

## 2020-08-13 DIAGNOSIS — R06.2 WHEEZING: ICD-10-CM

## 2020-10-12 ENCOUNTER — COMMUNICATION - HEALTHEAST (OUTPATIENT)
Dept: FAMILY MEDICINE | Facility: CLINIC | Age: 60
End: 2020-10-12

## 2020-10-12 ENCOUNTER — OFFICE VISIT - HEALTHEAST (OUTPATIENT)
Dept: FAMILY MEDICINE | Facility: CLINIC | Age: 60
End: 2020-10-12

## 2020-10-12 DIAGNOSIS — E11.8 TYPE 2 DIABETES MELLITUS WITH COMPLICATION, WITHOUT LONG-TERM CURRENT USE OF INSULIN (H): ICD-10-CM

## 2020-10-12 DIAGNOSIS — G47.33 OSA (OBSTRUCTIVE SLEEP APNEA): ICD-10-CM

## 2020-10-12 DIAGNOSIS — E78.2 MIXED HYPERLIPIDEMIA: ICD-10-CM

## 2020-10-12 DIAGNOSIS — F22 DELUSIONAL DISORDER (H): ICD-10-CM

## 2020-10-12 DIAGNOSIS — M89.279: ICD-10-CM

## 2020-10-12 DIAGNOSIS — R63.4 WEIGHT LOSS: ICD-10-CM

## 2020-10-12 DIAGNOSIS — I25.5 ISCHEMIC CARDIOMYOPATHY: ICD-10-CM

## 2020-10-12 DIAGNOSIS — Z00.00 ROUTINE GENERAL MEDICAL EXAMINATION AT A HEALTH CARE FACILITY: ICD-10-CM

## 2020-10-12 DIAGNOSIS — Z23 NEED FOR VACCINATION: ICD-10-CM

## 2020-10-12 DIAGNOSIS — G89.4 CHRONIC PAIN SYNDROME: ICD-10-CM

## 2020-10-12 LAB
ALBUMIN SERPL-MCNC: 4.3 G/DL (ref 3.5–5)
ALP SERPL-CCNC: 64 U/L (ref 45–120)
ALT SERPL W P-5'-P-CCNC: 21 U/L (ref 0–45)
ANION GAP SERPL CALCULATED.3IONS-SCNC: 11 MMOL/L (ref 5–18)
AST SERPL W P-5'-P-CCNC: 25 U/L (ref 0–40)
BILIRUB SERPL-MCNC: 0.3 MG/DL (ref 0–1)
BUN SERPL-MCNC: 20 MG/DL (ref 8–22)
CALCIUM SERPL-MCNC: 9.6 MG/DL (ref 8.5–10.5)
CHLORIDE BLD-SCNC: 108 MMOL/L (ref 98–107)
CHOLEST SERPL-MCNC: 154 MG/DL
CO2 SERPL-SCNC: 22 MMOL/L (ref 22–31)
CREAT SERPL-MCNC: 0.87 MG/DL (ref 0.6–1.1)
CREAT UR-MCNC: 65.5 MG/DL
FASTING STATUS PATIENT QL REPORTED: YES
GFR SERPL CREATININE-BSD FRML MDRD: >60 ML/MIN/1.73M2
GLUCOSE BLD-MCNC: 151 MG/DL (ref 70–125)
HBA1C MFR BLD: 7.3 %
HDLC SERPL-MCNC: 38 MG/DL
LDLC SERPL CALC-MCNC: 86 MG/DL
MICROALBUMIN UR-MCNC: <0.5 MG/DL (ref 0–1.99)
MICROALBUMIN/CREAT UR: NORMAL MG/G{CREAT}
POTASSIUM BLD-SCNC: 4.9 MMOL/L (ref 3.5–5)
PROLACTIN SERPL-MCNC: 8.1 NG/ML (ref 0–20)
PROT SERPL-MCNC: 6.8 G/DL (ref 6–8)
SODIUM SERPL-SCNC: 141 MMOL/L (ref 136–145)
TRIGL SERPL-MCNC: 151 MG/DL
TSH SERPL DL<=0.005 MIU/L-ACNC: 2.76 UIU/ML (ref 0.3–5)

## 2020-10-12 ASSESSMENT — MIFFLIN-ST. JEOR: SCORE: 1428.64

## 2020-10-12 ASSESSMENT — PATIENT HEALTH QUESTIONNAIRE - PHQ9: SUM OF ALL RESPONSES TO PHQ QUESTIONS 1-9: 10

## 2020-10-22 ENCOUNTER — COMMUNICATION - HEALTHEAST (OUTPATIENT)
Dept: FAMILY MEDICINE | Facility: CLINIC | Age: 60
End: 2020-10-22

## 2020-11-06 ENCOUNTER — COMMUNICATION - HEALTHEAST (OUTPATIENT)
Dept: CARDIOLOGY | Facility: CLINIC | Age: 60
End: 2020-11-06

## 2020-11-06 ENCOUNTER — COMMUNICATION - HEALTHEAST (OUTPATIENT)
Dept: FAMILY MEDICINE | Facility: CLINIC | Age: 60
End: 2020-11-06

## 2020-11-06 DIAGNOSIS — I25.5 ISCHEMIC CARDIOMYOPATHY: ICD-10-CM

## 2020-11-06 DIAGNOSIS — E11.8 TYPE 2 DIABETES MELLITUS WITH COMPLICATION, WITHOUT LONG-TERM CURRENT USE OF INSULIN (H): ICD-10-CM

## 2020-11-23 ENCOUNTER — COMMUNICATION - HEALTHEAST (OUTPATIENT)
Dept: FAMILY MEDICINE | Facility: CLINIC | Age: 60
End: 2020-11-23

## 2020-11-24 ENCOUNTER — AMBULATORY - HEALTHEAST (OUTPATIENT)
Dept: LAB | Facility: CLINIC | Age: 60
End: 2020-11-24

## 2020-11-24 DIAGNOSIS — M89.279: ICD-10-CM

## 2020-11-25 LAB — GH SERPL-MCNC: 1.4 UG/L

## 2020-11-30 ENCOUNTER — COMMUNICATION - HEALTHEAST (OUTPATIENT)
Dept: FAMILY MEDICINE | Facility: CLINIC | Age: 60
End: 2020-11-30

## 2020-12-21 ENCOUNTER — COMMUNICATION - HEALTHEAST (OUTPATIENT)
Dept: CARDIOLOGY | Facility: CLINIC | Age: 60
End: 2020-12-21

## 2020-12-21 ENCOUNTER — OFFICE VISIT - HEALTHEAST (OUTPATIENT)
Dept: CARDIOLOGY | Facility: CLINIC | Age: 60
End: 2020-12-21

## 2020-12-21 DIAGNOSIS — I25.10 CAD (CORONARY ARTERY DISEASE): ICD-10-CM

## 2020-12-21 RX ORDER — EZETIMIBE 10 MG/1
10 TABLET ORAL DAILY
Qty: 90 TABLET | Refills: 3 | Status: SHIPPED | OUTPATIENT
Start: 2020-12-21 | End: 2021-12-31

## 2021-01-12 ENCOUNTER — COMMUNICATION - HEALTHEAST (OUTPATIENT)
Dept: FAMILY MEDICINE | Facility: CLINIC | Age: 61
End: 2021-01-12

## 2021-01-12 DIAGNOSIS — E11.8 TYPE 2 DIABETES MELLITUS WITH COMPLICATION, WITHOUT LONG-TERM CURRENT USE OF INSULIN (H): ICD-10-CM

## 2021-01-12 DIAGNOSIS — I25.5 ISCHEMIC CARDIOMYOPATHY: ICD-10-CM

## 2021-01-12 RX ORDER — LOSARTAN POTASSIUM 25 MG/1
25 TABLET ORAL DAILY
Qty: 90 TABLET | Refills: 2 | Status: SHIPPED | OUTPATIENT
Start: 2021-01-12 | End: 2022-01-07

## 2021-01-28 ENCOUNTER — OFFICE VISIT - HEALTHEAST (OUTPATIENT)
Dept: CARDIOLOGY | Facility: CLINIC | Age: 61
End: 2021-01-28

## 2021-01-28 DIAGNOSIS — I25.5 ISCHEMIC CARDIOMYOPATHY: ICD-10-CM

## 2021-01-28 DIAGNOSIS — I25.10 CORONARY ARTERY DISEASE INVOLVING NATIVE CORONARY ARTERY OF NATIVE HEART WITHOUT ANGINA PECTORIS: ICD-10-CM

## 2021-01-28 DIAGNOSIS — E78.2 MIXED HYPERLIPIDEMIA: ICD-10-CM

## 2021-01-28 DIAGNOSIS — I51.89 MILD LEFT VENTRICULAR SYSTOLIC DYSFUNCTION: ICD-10-CM

## 2021-02-06 ENCOUNTER — COMMUNICATION - HEALTHEAST (OUTPATIENT)
Dept: CARDIOLOGY | Facility: CLINIC | Age: 61
End: 2021-02-06

## 2021-02-06 DIAGNOSIS — I25.5 ISCHEMIC CARDIOMYOPATHY: ICD-10-CM

## 2021-02-10 ENCOUNTER — COMMUNICATION - HEALTHEAST (OUTPATIENT)
Dept: CARDIOLOGY | Facility: CLINIC | Age: 61
End: 2021-02-10

## 2021-03-23 ENCOUNTER — AMBULATORY - HEALTHEAST (OUTPATIENT)
Dept: NURSING | Facility: CLINIC | Age: 61
End: 2021-03-23

## 2021-03-24 ENCOUNTER — OFFICE VISIT - HEALTHEAST (OUTPATIENT)
Dept: CARDIOLOGY | Facility: CLINIC | Age: 61
End: 2021-03-24

## 2021-03-25 ENCOUNTER — COMMUNICATION - HEALTHEAST (OUTPATIENT)
Dept: CARDIOLOGY | Facility: CLINIC | Age: 61
End: 2021-03-25

## 2021-03-25 ENCOUNTER — OFFICE VISIT - HEALTHEAST (OUTPATIENT)
Dept: CARDIOLOGY | Facility: CLINIC | Age: 61
End: 2021-03-25

## 2021-03-25 DIAGNOSIS — I25.10 CAD (CORONARY ARTERY DISEASE): ICD-10-CM

## 2021-04-13 ENCOUNTER — AMBULATORY - HEALTHEAST (OUTPATIENT)
Dept: NURSING | Facility: CLINIC | Age: 61
End: 2021-04-13

## 2021-04-20 ENCOUNTER — COMMUNICATION - HEALTHEAST (OUTPATIENT)
Dept: CARDIOLOGY | Facility: CLINIC | Age: 61
End: 2021-04-20

## 2021-05-20 ENCOUNTER — COMMUNICATION - HEALTHEAST (OUTPATIENT)
Dept: CARDIOLOGY | Facility: CLINIC | Age: 61
End: 2021-05-20

## 2021-05-20 ENCOUNTER — RECORDS - HEALTHEAST (OUTPATIENT)
Dept: ADMINISTRATIVE | Facility: OTHER | Age: 61
End: 2021-05-20

## 2021-05-20 ENCOUNTER — COMMUNICATION - HEALTHEAST (OUTPATIENT)
Dept: FAMILY MEDICINE | Facility: CLINIC | Age: 61
End: 2021-05-20

## 2021-05-20 DIAGNOSIS — I21.4 NON-STEMI (NON-ST ELEVATED MYOCARDIAL INFARCTION) (H): ICD-10-CM

## 2021-05-20 DIAGNOSIS — I25.5 ISCHEMIC CARDIOMYOPATHY: ICD-10-CM

## 2021-05-20 DIAGNOSIS — E11.8 TYPE 2 DIABETES MELLITUS WITH COMPLICATION, WITHOUT LONG-TERM CURRENT USE OF INSULIN (H): ICD-10-CM

## 2021-05-20 RX ORDER — TICAGRELOR 90 MG/1
TABLET ORAL
Qty: 180 TABLET | Refills: 2 | Status: SHIPPED | OUTPATIENT
Start: 2021-05-20 | End: 2022-04-28

## 2021-05-20 RX ORDER — ATORVASTATIN CALCIUM 80 MG/1
TABLET, FILM COATED ORAL
Qty: 90 TABLET | Refills: 2 | Status: SHIPPED | OUTPATIENT
Start: 2021-05-20 | End: 2022-02-09

## 2021-05-20 RX ORDER — FUROSEMIDE 40 MG
40 TABLET ORAL DAILY
Qty: 90 TABLET | Refills: 1 | Status: SHIPPED | OUTPATIENT
Start: 2021-05-20 | End: 2021-11-09

## 2021-05-21 RX ORDER — METOPROLOL SUCCINATE 25 MG/1
12.5 TABLET, EXTENDED RELEASE ORAL AT BEDTIME
Qty: 45 TABLET | Refills: 1 | Status: SHIPPED | OUTPATIENT
Start: 2021-05-21 | End: 2021-11-11

## 2021-05-27 ENCOUNTER — RECORDS - HEALTHEAST (OUTPATIENT)
Dept: ADMINISTRATIVE | Facility: CLINIC | Age: 61
End: 2021-05-27

## 2021-05-27 ASSESSMENT — PATIENT HEALTH QUESTIONNAIRE - PHQ9: SUM OF ALL RESPONSES TO PHQ QUESTIONS 1-9: 10

## 2021-05-28 NOTE — TELEPHONE ENCOUNTER
Refill Approved    Rx renewed per Medication Renewal Policy. Medication was last renewed on 1/25/19.    Day Le[facility], [facility] Triage/Med Refill 4/23/2019     Requested Prescriptions   Pending Prescriptions Disp Refills     DULoxetine (CYMBALTA) 20 MG capsule 180 capsule 0     Sig: Take 1 capsule (20 mg total) by mouth 2 (two) times a day.       Tricyclics/Misc Antidepressant/Antianxiety Meds Refill Protocol Passed - 4/21/2019  9:55 PM        Passed - PCP or prescribing provider visit in last year     Last office visit with prescriber/PCP: 12/12/2018 Lucretia Raya MD OR same dept: 12/12/2018 Lucretia Raya MD OR same specialty: 12/12/2018 Lucretia Raya MD  Last physical: Visit date not found Last MTM visit: Visit date not found   Next visit within 3 mo: Visit date not found  Next physical within 3 mo: Visit date not found  Prescriber OR PCP: Lucretia Raya MD  Last diagnosis associated with med order: There are no diagnoses linked to this encounter.  If protocol passes may refill for 12 months if within 3 months of last provider visit (or a total of 15 months).

## 2021-05-29 NOTE — TELEPHONE ENCOUNTER
Refill Approved    Rx's renewed per Medication Renewal Policy. Medications last renewed on 3/16/2018.  Last OV 12/12/2018    Jenny Kaminski, [facility] Connection Triage/Med Refill 6/4/2019     Requested Prescriptions   Pending Prescriptions Disp Refills     lisinopril (PRINIVIL,ZESTRIL) 5 MG tablet [Pharmacy Med Name: LISINOPRIL 5MG TABS] 90 tablet 4     Sig: TAKE ONE TABLET BY MOUTH EVERY DAY       Ace Inhibitors Refill Protocol Passed - 6/4/2019  4:30 AM        Passed - PCP or prescribing provider visit in past 12 months       Last office visit with prescriber/PCP: 9/6/2018 Deya Jon CNP OR same dept: 12/12/2018 Lucretia Raya MD OR same specialty: 12/12/2018 Lucretia Raya MD  Last physical: Visit date not found Last MTM visit: Visit date not found   Next visit within 3 mo: Visit date not found  Next physical within 3 mo: Visit date not found  Prescriber OR PCP: Deya Jon CNP  Last diagnosis associated with med order: 1. Type 2 diabetes mellitus (H)  - lisinopril (PRINIVIL,ZESTRIL) 5 MG tablet [Pharmacy Med Name: LISINOPRIL 5MG TABS]; TAKE ONE TABLET BY MOUTH EVERY DAY  Dispense: 90 tablet; Refill: 4  - atorvastatin (LIPITOR) 10 MG tablet [Pharmacy Med Name: ATORVASTATIN CALCIUM 10MG TABS]; TAKE ONE TABLET BY MOUTH AT BEDTIME  Dispense: 90 tablet; Refill: 4    If protocol passes may refill for 12 months if within 3 months of last provider visit (or a total of 15 months).             Passed - Serum Potassium in past 12 months     Lab Results   Component Value Date    Potassium 5.3 (H) 12/12/2018             Passed - Blood pressure filed in past 12 months     BP Readings from Last 1 Encounters:   12/12/18 134/86             Passed - Serum Creatinine in past 12 months     Creatinine   Date Value Ref Range Status   12/12/2018 0.84 0.60 - 1.10 mg/dL Final             atorvastatin (LIPITOR) 10 MG tablet [Pharmacy Med Name: ATORVASTATIN CALCIUM 10MG TABS] 90 tablet 4     Sig: TAKE ONE TABLET BY MOUTH  AT BEDTIME       Statins Refill Protocol (Hmg CoA Reductase Inhibitors) Passed - 6/4/2019  4:30 AM        Passed - PCP or prescribing provider visit in past 12 months      Last office visit with prescriber/PCP: 9/6/2018 Deya Jon CNP OR same dept: 12/12/2018 Lucretia Raya MD OR same specialty: 12/12/2018 Lucretia Raya MD  Last physical: Visit date not found Last MTM visit: Visit date not found   Next visit within 3 mo: Visit date not found  Next physical within 3 mo: Visit date not found  Prescriber OR PCP: Deya Jon CNP  Last diagnosis associated with med order: 1. Type 2 diabetes mellitus (H)  - lisinopril (PRINIVIL,ZESTRIL) 5 MG tablet [Pharmacy Med Name: LISINOPRIL 5MG TABS]; TAKE ONE TABLET BY MOUTH EVERY DAY  Dispense: 90 tablet; Refill: 4  - atorvastatin (LIPITOR) 10 MG tablet [Pharmacy Med Name: ATORVASTATIN CALCIUM 10MG TABS]; TAKE ONE TABLET BY MOUTH AT BEDTIME  Dispense: 90 tablet; Refill: 4    If protocol passes may refill for 12 months if within 3 months of last provider visit (or a total of 15 months).

## 2021-05-30 NOTE — PROGRESS NOTES
"  Assessment/Plan:     1. Type 2 diabetes mellitus with complication, without long-term current use of insulin (H)  Inadequate control is primarily due to medication noncompliance.  Encourage efforts at healthy lifestyle habits.  Taylor means to help remember to take her metformin, encouraged her to schedule a set an alarm on her phone.  Will obtain microalbumin to assess for microalbuminuria, will check direct LDL as she is not fasting today, and will reassess renal function and potassium level.  Follow-up in 3 months.  - Glycosylated Hemoglobin A1c; Standing  - Microalbumin, Random Urine  - Glycosylated Hemoglobin A1c  - Basic Metabolic Panel  - LDL Cholesterol, Direct    2. Chronic pain syndrome  3. Chronic daily headache  We discussed options.  Prescription is provided for increased dose of duloxetine to 60 mg daily.  Information provided regarding the book \"Heal Your Headache\" and also encourage adequate hydration, regular eating habits.  Discussed possibility that obstructive sleep apnea is contributing, she is not interested in pursuing this further as she did not tolerate CPAP in the past and is not interested in trying again.  Discussed rebound headaches and their role.  Discussed prophylactic medication, would avoid gabapentin due to prior intolerance, may consider propranolol or Topamax but declines prescription today.    4. Delusional disorder (H)  Chronic, stable, this is not disruptive to her life at this time and therefore I feel that it is reasonable to forego any treatments or further assessment.    5. Morbid obesity (H)  Encouraged efforts at healthy lifestyle habits.    6. Mixed hyperlipidemia  She remains on atorvastatin daily, will check direct LDL today.      Patient Instructions   Increase duloxetine to 3 caps or a total of 60 mg daily.  Let me know how things go over the next 2 months.  If you notice side effects or if you are not noticing any benefit after 2 months, we should consider tapering " "off.    Set an alarm on your phone so that you can remember to take your metformin.  Your blood sugars are running too high.  I would like to recheck your diabetes in 3 months for that reason.    Schedule an eye exam at [facility].    Check out the book \"Heal Your Headache: the 1, 2, 3...\" for ideas on dietary measures and life habits that can help reduce your headache frequency.       Return in about 3 months (around 9/26/2019) for Diabetes.      Subjective:      Catina Acuna is a 58 y.o. female presented to clinic today for follow-up of type 2 diabetes.  She is not checking her blood sugars recently, denies any sense of low sugars.  She suspects she is missing metformin 2 or 3 times per week.  Not following any particular diet.  No regular exercise.  Describes daily headaches that are occurring in the morning, worse when she first awakens, and persist throughout the day.  There is some improvement with ibuprofen and that it takes the edge off so she is able to function.  Denies any neck pain or tension.  No prior history of obstructive sleep apnea, attempted treatment with CPAP but did not tolerate for his without treatments currently.  Not interested in further treatments for this.  At last visit we initiated duloxetine 20 mg twice daily, no side effects but does not note that is been helpful thus far.  She is also had chronic low back pain from overuse and recently had some right-sided sciatica, that has since resolved.  She receives her eye exams at [facility], last eye exam was April 2018, agreeable to scheduling follow-up.  She remains on aspirin.  We discussed cancer screening, she is not interested in mammogram, colonoscopy, or Pap smear.  She declines tetanus booster today.    Current Outpatient Medications   Medication Sig Dispense Refill     aspirin 81 MG EC tablet Take 1 tablet (81 mg total) by mouth daily. 90 tablet 4     atorvastatin (LIPITOR) 20 MG tablet Take 1 tablet (20 mg " total) by mouth at bedtime. 90 tablet 4     blood glucose meter (GLUCOMETER) Use 1 each As Directed daily. Dispense glucometer brand per patient's insurance at pharmacy discretion. 1 each 0     blood glucose test strips Use 1 each As Directed daily. Dispense brand per patient's insurance at pharmacy discretion. 100 strip 5     DULoxetine (CYMBALTA) 20 MG capsule Take 1 capsule (20 mg total) by mouth 2 (two) times a day. 180 capsule 1     generic lancets (FINGERSTIX LANCETS) Use 1 each As Directed daily. Dispense brand per patient's insurance at pharmacy discretion. 100 each 5     ibuprofen (ADVIL,MOTRIN) 200 MG tablet Take 200 mg by mouth every 6 (six) hours as needed for pain.       lisinopril (PRINIVIL,ZESTRIL) 5 MG tablet TAKE ONE TABLET BY MOUTH EVERY DAY 90 tablet 2     metFORMIN (GLUCOPHAGE XR) 500 MG 24 hr tablet Take 4 tablets (2,000 mg total) by mouth daily with supper. 360 tablet 4     No current facility-administered medications for this visit.        Past Medical History, Family History, and Social History reviewed.  Past Medical History:   Diagnosis Date     Depression      Past Surgical History:   Procedure Laterality Date     ANKLE FRACTURE SURGERY       Patient has no known allergies.  Family History   Problem Relation Age of Onset     Hypertension Mother      Heart failure Mother      Dementia Mother      Heart disease Mother      Hypertension Father      Heart disease Father      Diabetes Father      Emphysema Father      Social History     Socioeconomic History     Marital status: Single     Spouse name: Not on file     Number of children: Not on file     Years of education: Not on file     Highest education level: Not on file   Occupational History     Not on file   Social Needs     Financial resource strain: Not on file     Food insecurity:     Worry: Not on file     Inability: Not on file     Transportation needs:     Medical: Not on file     Non-medical: Not on file   Tobacco Use     Smoking  "status: Current Some Day Smoker     Packs/day: 0.10     Years: 20.00     Pack years: 2.00     Smokeless tobacco: Never Used   Substance and Sexual Activity     Alcohol use: No     Comment: h/o alcohol abuse      Drug use: No     Sexual activity: Never     Comment: single   Lifestyle     Physical activity:     Days per week: Not on file     Minutes per session: Not on file     Stress: Not on file   Relationships     Social connections:     Talks on phone: Not on file     Gets together: Not on file     Attends [employer] service: Not on file     Active member of club or organization: Not on file     Attends meetings of clubs or organizations: Not on file     Relationship status: Not on file     Intimate partner violence:     Fear of current or ex partner: Not on file     Emotionally abused: Not on file     Physically abused: Not on file     Forced sexual activity: Not on file   Other Topics Concern     Not on file   Social History Narrative     Not on file         Review of systems is as stated in HPI, and the remainder of the 10 system review is otherwise negative.    Objective:     Vitals:    06/26/19 1403   BP: 130/84   Pulse: 86   Resp: 20   Temp: 98.8  F (37.1  C)   TempSrc: Axillary   SpO2: 98%   Weight: 216 lb 11.2 oz (98.3 kg)   Height: 5' 5\" (1.651 m)    Body mass index is 36.06 kg/m .    Alert female.  Mucous memories moist.  Heart with regular rate and rhythm.  Lungs clear.  No neck tension to palpation.  Pupils are equal, round, reactive to light.  Heart with regular rate and rhythm.  Lungs clear.  No edema.  Normal foot exam.      This note has been dictated using voice recognition software. Any grammatical or context distortions are unintentional and inherent to the the software.   "

## 2021-05-30 NOTE — TELEPHONE ENCOUNTER
----- Message from Lucretia Raya MD sent at 7/1/2019 10:05 AM CDT -----  So sorry, I think my dragon missed several words.  You may tell her this:    Your urine test indicates that you have microscopic protein leakage, which is an early sign of damage to your your kidneys from diabetes.  It is essentially stable from last year.  We can reduce the progression of this by controlling blood pressure, by controlling blood sugar, and by continuing the medication lisinopril.  Your cholesterol is very elevated.  It does not matter whether or not you were fasting at the time we checked your cholesterol as this is the blood test that is not affected by eating..  I would like you to increase your atorvastatin to 40 mg at bedtime.    Thanks!  VNEERAJ  ----- Message -----  From: Aruna English CMA  Sent: 7/1/2019   8:20 AM  To: Lucretia Raya MD    Can you clarify what her urine test indicated?   ----- Message -----  From: Lucretia Raya MD  Sent: 6/30/2019   9:57 PM  To: Lucretia Raya Care Team Pool    Your urine test indicates an your kidneys from diabetes.  It is essentially stable from last year.  We can reduce the progression of this by controlling blood pressure, and by continuing the medication lisinopril.  Your cholesterol is very elevated.  It does not matter whether or not they recheck your cholesterol.  I would like you to increase your atorvastatin to 20 mg at bedtime.

## 2021-05-30 NOTE — PATIENT INSTRUCTIONS - HE
"Increase duloxetine to 3 caps or a total of 60 mg daily.  Let me know how things go over the next 2 months.  If you notice side effects or if you are not noticing any benefit after 2 months, we should consider tapering off.    Set an alarm on your phone so that you can remember to take your metformin.  Your blood sugars are running too high.  I would like to recheck your diabetes in 3 months for that reason.    Schedule an eye exam at [facility].    Check out the book \"Heal Your Headache: the 1, 2, 3...\" for ideas on dietary measures and life habits that can help reduce your headache frequency.  "

## 2021-05-31 ENCOUNTER — RECORDS - HEALTHEAST (OUTPATIENT)
Dept: ADMINISTRATIVE | Facility: CLINIC | Age: 61
End: 2021-05-31

## 2021-05-31 VITALS — WEIGHT: 227.1 LBS | HEIGHT: 65 IN | BODY MASS INDEX: 37.84 KG/M2

## 2021-05-31 VITALS — WEIGHT: 223.7 LBS | BODY MASS INDEX: 37.23 KG/M2

## 2021-05-31 VITALS — WEIGHT: 226 LBS | BODY MASS INDEX: 37.61 KG/M2

## 2021-06-01 VITALS — WEIGHT: 219.1 LBS | HEIGHT: 65 IN | BODY MASS INDEX: 36.5 KG/M2

## 2021-06-01 VITALS — BODY MASS INDEX: 36.54 KG/M2 | WEIGHT: 219.3 LBS | HEIGHT: 65 IN

## 2021-06-02 VITALS — WEIGHT: 221.8 LBS | BODY MASS INDEX: 36.91 KG/M2

## 2021-06-02 VITALS — HEIGHT: 65 IN | WEIGHT: 221.1 LBS | BODY MASS INDEX: 36.84 KG/M2

## 2021-06-02 NOTE — PROGRESS NOTES
Hospital Follow-up Visit:    Assessment/Plan:     1. Hospital discharge follow-up  2. NSTEMI (non-ST elevated myocardial infarction) (H)  Hospital discharge follow-up visit completed today.  Reviewed pertinent notes, labs and imaging with the patient today.  She denies any recurring symptoms since discharge.  Overall feeling well today.  Exam is benign.  She has follow-up appointment with cardiology on 11/14/2019, we discussed the importance of keeping this visit to further discuss management going forward.  In the meantime, we will have her continue with current medications as prescribed without changing dosing today.  We discussed increased risk of future cardiovascular events given her history, smoking etc.  Reviewed red flag symptoms that would warrant immediate evaluation in the ED.  I encouraged lifestyle modifications to help decrease this risk.  She is due for routine health maintenance, diabetes check and I encouraged her to schedule an annual exam in 1-2 months.    3. Acute congestive heart failure, unspecified heart failure type (H)  Reviewed echocardiogram results which indicated LV ejection fraction of 45 to 50%, hypokinesis, mitral insufficiency and left atrial enlargement.  Continue with furosemide at current dose as well as metoprolol, lisinopril and follow-up with cardiology yet scheduled appointment.    4. Mixed hyperlipidemia  She remains on statin and daily aspirin.    5. Type 2 diabetes mellitus with complication, without long-term current use of insulin (H)  Continue metformin.  We discussed importance of diabetes follow-up.  Return to clinic in 1 to 2 months for annual exam/follow-up of diabetes.    6. Tobacco use  I have counseled the patient for tobacco cessation and the follow up will occur  at the next visit.  - nicotine (NICODERM CQ) 21 mg/24 hr; Place 1 patch on the skin daily.  Dispense: 30 patch; Refill: 1  - nicotine (NICODERM CQ) 14 mg/24 hr; Place 1 patch on the skin daily.   Dispense: 30 patch; Refill: 0  - nicotine (NICODERM CQ) 7 mg/24 hr; Place 1 patch on the skin daily.  Dispense: 30 patch; Refill: 0        Subjective:     Catina Acuna is a 58 y.o. female who presents for a hospital discharge follow up.  Past medical history is significant for lipidemia, tobacco use and type 2 diabetes.  She presented to the emergency department on 10/30/2019 for evaluation of shortness of breath upon waking up.  She was noted to have an elevated troponin and possible non-STEMI.  She underwent a coronary angiogram which indicated a multivessel obstructive coronary artery disease.  A FABIAN was placed to the left circumflex artery.  She underwent an echocardiogram which indicated a mildly reduced left ventricular systolic function.  Ejection fraction of 45 to 50%, mild inferior hypokinesis and mild to moderate mitral insufficiency.  She was started on metoprolol, furosemide, Brilinta and increased dose of atorvastatin.  She was recommended to continue aspirin and lisinopril and follow-up with cardiology in a couple of weeks.  She is an appointment scheduled 11/14/2019.  She continues use of metformin 2000 mg daily.  Patient is smoked for several years.  She is hoping to quit after recent events.  She is interested in trying the nicotine patch today.  She denies having any recurring or new symptoms since hospital discharge.  She is tolerating medications well.  She denies any concerns regarding bleeding since starting Brilinta.  She is due for routine diabetes follow-up and annual exam.        Hospital/Nursing Home/IP Rehab Facility: [facility]  Date of Admission: 10/30/19   Date of Discharge:11/1/19  Reason(s) for Admission: Shortness of breath and chest pain            Do you have any problems taking your medication regularly?  None       Have you had any changes in your medication since discharge? None       Have you had any difficulty following your discharge or treatment plan?   No    Summary of hospitalization:  Hospital discharge summary reviewed  Diagnostic Tests/Treatments reviewed.  Follow up needed: Follow-up with cardiology on 11/14/2019  Other Healthcare Providers Involved in Patient's Care: Patient Care Team:  Lucretia Raya MD as PCP - General  Papo Patel MD as Physician (Ophthalmology)  Lucretia Raya MD as Assigned PCP      Update since discharge: {improved   Information from family, SNF, care coordination: n/a     Post Discharge Medication Reconciliation: discharge medications reconciled, continue medications without change  Plan of care communicated with: patient    Objective:     Vitals:    11/04/19 1139   BP: 120/70   Pulse: 96   Weight: 209 lb 4.8 oz (94.9 kg)         Physical Exam:    General Appearance:  Alert, cooperative, no distress, appears stated age   Lungs:   Clear to auscultation bilaterally, respirations unlabored.  No expiratory wheeze or inspiratory crackles noted.   Heart:  Regular rate and rhythm, S1, S2 normal, no murmur, rub or gallop   Extremities: Extremities normal.  No cyanosis or edema   Skin: Warm, dry.  Skin color, texture, turgor normal, no rashes or lesions   Neurologic:  Grossly intact.         EXAM: CTA CHEST PE RUN  LOCATION: [facility]  DATE/TIME: 10/30/2019 9:52 AM     INDICATION: PE suspected, intermediate prob, positive D-dimer dyspnea, elevated d-dimer, further delineate interstitial opacities possible new CHF  COMPARISON: Chest radiographs today.  TECHNIQUE: CT angiogram chest during arterial phase injection IV contrast. 2D and 3D MIP reconstructions were performed by the CT technologist. Dose reduction techniques were used.   CONTRAST: Iohexol (Omni) 100 mL     FINDINGS:  ANGIOGRAM CHEST: No pulmonary embolus. No aortic dissection or aneurysm.      LUNGS AND PLEURA: Mild airway thickening of the lower lobes. Mild dependent and basilar atelectasis.     MEDIASTINUM/AXILLAE: Normal.     UPPER ABDOMEN: Small hiatal  hernia.     MUSCULOSKELETAL: Normal.     IMPRESSION:   No pulmonary embolus.      Coronary angiogram:    Ost RCA to Mid RCA lesion is 35% stenosed.    Mid RCA lesion is 100% stenosed.    Mid Cx to Dist Cx lesion is 80% stenosed.    Pressure wire/FFR was performed on the LCX lesion. pre diagnositic: 0.4. post diagnostic: 0.4.    A drug eluting stent was successfully placed into LCX.        Coding guidelines for this visit:  Type of Medical   Decision Making Face-to-Face Visit       within 7 Days of discharge Face-to-Face Visit        within 14 days of discharge   Moderate Complexity [id number]   High Complexity [id number]       Electronically signed by Nellie Smith CNP 11/04/19 8:59 PM

## 2021-06-03 VITALS
HEART RATE: 96 BPM | DIASTOLIC BLOOD PRESSURE: 70 MMHG | BODY MASS INDEX: 34.83 KG/M2 | WEIGHT: 209.3 LBS | SYSTOLIC BLOOD PRESSURE: 120 MMHG

## 2021-06-03 VITALS
RESPIRATION RATE: 18 BRPM | BODY MASS INDEX: 34.99 KG/M2 | DIASTOLIC BLOOD PRESSURE: 66 MMHG | HEART RATE: 64 BPM | WEIGHT: 210 LBS | HEIGHT: 65 IN | SYSTOLIC BLOOD PRESSURE: 100 MMHG

## 2021-06-03 VITALS — WEIGHT: 213.5 LBS | BODY MASS INDEX: 35.57 KG/M2 | HEIGHT: 65 IN

## 2021-06-03 VITALS — BODY MASS INDEX: 36.1 KG/M2 | HEIGHT: 65 IN | WEIGHT: 216.7 LBS

## 2021-06-03 NOTE — PROGRESS NOTES
Hospital discharge follow up call to pt, no answer.  Left VM message reminding pt of appt  today. RN unable to make second call before this morning's appt.

## 2021-06-03 NOTE — PATIENT INSTRUCTIONS - HE
Catina Acuna,    It was a pleasure to see you today at the [facility].     My recommendations after this visit include:  - Your labs will be on MyChart.    - See Dr. Mabry in 4-6 weeks.   - Call cardiac rehab to schedule.   - Continue to monitor for signs of retaining fluid (increasing weights, shortness of breath, swelling) and call with any concerns. [phone]      Madina Garcia, CNP

## 2021-06-03 NOTE — TELEPHONE ENCOUNTER
Refill Approved    Rx renewed per Medication Renewal Policy. Medication was last renewed on 10/12/18.    Day Lebron, [facility] Triage/Med Refill 11/30/2019     Requested Prescriptions   Pending Prescriptions Disp Refills     ONETOUCH VERIO strips [Pharmacy Med Name: ONETOUCH VERIO  STRP]  5     Sig: USE TO TEST BLOOD SUGAR ONCE DAILY AS DIRECTED       Diabetic Supplies Refill Protocol Passed - 11/29/2019  2:16 PM        Passed - Visit with PCP or prescribing provider visit in last 6 months     Last office visit with prescriber/PCP: Visit date not found OR same dept: 11/4/2019 Nellie Smith CNP OR same specialty: 11/4/2019 Nellie Smith CNP  Last physical: Visit date not found Last MTM visit: Visit date not found   Next visit within 3 mo: Visit date not found  Next physical within 3 mo: Visit date not found  Prescriber OR PCP: Kai Marquez MD  Last diagnosis associated with med order: There are no diagnoses linked to this encounter.  If protocol passes may refill for 12 months if within 3 months of last provider visit (or a total of 15 months).             Passed - A1C in last 6 months     Hemoglobin A1c   Date Value Ref Range Status   11/01/2019 8.5 (H) 4.2 - 6.1 % Final               ONETOUCH DELICA LANCETS 33 gauge Misc [Pharmacy Med Name: ONETOUCH DELICA LANCETS 33G  MISC] 100 each 5     Sig: USE TO TEST BLOOD SUGAR ONCE DAILY       Diabetic Supplies Refill Protocol Passed - 11/29/2019  2:16 PM        Passed - Visit with PCP or prescribing provider visit in last 6 months     Last office visit with prescriber/PCP: Visit date not found OR same dept: 11/4/2019 Nellie Smith CNP OR same specialty: 11/4/2019 Nellie Smith CNP  Last physical: Visit date not found Last MTM visit: Visit date not found   Next visit within 3 mo: Visit date not found  Next physical within 3 mo: Visit date not found  Prescriber OR PCP: Kai Marquez MD  Last diagnosis associated with med order: There are no diagnoses  linked to this encounter.  If protocol passes may refill for 12 months if within 3 months of last provider visit (or a total of 15 months).             Passed - A1C in last 6 months     Hemoglobin A1c   Date Value Ref Range Status   11/01/2019 8.5 (H) 4.2 - 6.1 % Final

## 2021-06-03 NOTE — TELEPHONE ENCOUNTER
Refill Approved    Rx renewed per Medication Renewal Policy. Medication was last renewed on 4/23/19.    Mary Pacheco, [facility] Triage/Med Refill 11/7/2019     Requested Prescriptions   Pending Prescriptions Disp Refills     DULoxetine (CYMBALTA) 20 MG capsule 180 capsule 1     Sig: Take 1 capsule (20 mg total) by mouth 2 (two) times a day.       Tricyclics/Misc Antidepressant/Antianxiety Meds Refill Protocol Passed - 11/7/2019 10:26 PM        Passed - PCP or prescribing provider visit in last year     Last office visit with prescriber/PCP: 6/26/2019 Lucretia Raya MD OR same dept: 11/4/2019 Nellie Smith CNP OR same specialty: 11/4/2019 Nellie Smith CNP  Last physical: Visit date not found Last MTM visit: Visit date not found   Next visit within 3 mo: Visit date not found  Next physical within 3 mo: Visit date not found  Prescriber OR PCP: Lucretia Raya MD  Last diagnosis associated with med order: 1. Spondylosis of lumbar region without myelopathy or radiculopathy  - DULoxetine (CYMBALTA) 20 MG capsule; Take 1 capsule (20 mg total) by mouth 2 (two) times a day.  Dispense: 180 capsule; Refill: 1    If protocol passes may refill for 12 months if within 3 months of last provider visit (or a total of 15 months).

## 2021-06-04 ENCOUNTER — HOSPITAL ENCOUNTER (OUTPATIENT)
Dept: CARDIOLOGY | Facility: CLINIC | Age: 61
Discharge: HOME | End: 2021-06-04
Attending: INTERNAL MEDICINE
Payer: COMMERCIAL

## 2021-06-04 ENCOUNTER — AMBULATORY - HEALTHEAST (OUTPATIENT)
Dept: LAB | Facility: CLINIC | Age: 61
End: 2021-06-04

## 2021-06-04 VITALS — WEIGHT: 192 LBS | BODY MASS INDEX: 31.95 KG/M2

## 2021-06-04 VITALS
BODY MASS INDEX: 34.07 KG/M2 | HEIGHT: 65 IN | DIASTOLIC BLOOD PRESSURE: 60 MMHG | SYSTOLIC BLOOD PRESSURE: 102 MMHG | WEIGHT: 204.5 LBS | RESPIRATION RATE: 12 BRPM | HEART RATE: 64 BPM

## 2021-06-04 VITALS — BODY MASS INDEX: 33.99 KG/M2 | WEIGHT: 204 LBS | HEIGHT: 65 IN

## 2021-06-04 DIAGNOSIS — I25.10 CAD (CORONARY ARTERY DISEASE): ICD-10-CM

## 2021-06-04 LAB
ALBUMIN SERPL-MCNC: 4.6 G/DL (ref 3.5–5)
ALP SERPL-CCNC: 66 U/L (ref 45–120)
ALT SERPL W P-5'-P-CCNC: 18 U/L (ref 0–45)
ANION GAP SERPL CALCULATED.3IONS-SCNC: 10 MMOL/L (ref 5–18)
AORTIC ROOT: 3.3 CM
AORTIC VALVE MEAN VELOCITY: 87.7 CM/S
ASCENDING AORTA: 3.5 CM
AST SERPL W P-5'-P-CCNC: 21 U/L (ref 0–40)
AV DIMENSIONLESS INDEX VTI: 0.8
AV MEAN GRADIENT: 4 MMHG
AV PEAK GRADIENT: 7.2 MMHG
AV VALVE AREA: 2.6 CM2
AV VELOCITY RATIO: 0.8
BILIRUB SERPL-MCNC: 0.6 MG/DL (ref 0–1)
BSA FOR ECHO PROCEDURE: 1.98 M2
BUN SERPL-MCNC: 18 MG/DL (ref 8–22)
CALCIUM SERPL-MCNC: 9.6 MG/DL (ref 8.5–10.5)
CHLORIDE BLD-SCNC: 104 MMOL/L (ref 98–107)
CHOLEST SERPL-MCNC: 135 MG/DL
CO2 SERPL-SCNC: 25 MMOL/L (ref 22–31)
CREAT SERPL-MCNC: 0.95 MG/DL (ref 0.6–1.1)
CV BLOOD PRESSURE: ABNORMAL MMHG
CV ECHO HEIGHT: 65 IN
CV ECHO WEIGHT: 188 LBS
DOP CALC AO PEAK VEL: 134 CM/S
DOP CALC AO VTI: 26 CM
DOP CALC LVOT AREA: 3.14 CM2
DOP CALC LVOT DIAMETER: 2 CM
DOP CALC LVOT PEAK VEL: 104 CM/S
DOP CALC LVOT STROKE VOLUME: 67.2 CM3
DOP CALCLVOT PEAK VEL VTI: 21.4 CM
EJECTION FRACTION: 52 % (ref 55–75)
FASTING STATUS PATIENT QL REPORTED: YES
FRACTIONAL SHORTENING: 14.9 % (ref 28–44)
GFR SERPL CREATININE-BSD FRML MDRD: 60 ML/MIN/1.73M2
GLUCOSE BLD-MCNC: 145 MG/DL (ref 70–125)
HDLC SERPL-MCNC: 35 MG/DL
INTERVENTRICULAR SEPTUM IN END DIASTOLE: 0.9 CM (ref 0.6–0.9)
IVS/PW RATIO: 1
LA AREA 1: 15.8 CM2
LDLC SERPL CALC-MCNC: 71 MG/DL
LEFT ATRIUM LENGTH: 4.84 CM
LEFT ATRIUM SIZE: 3.5 CM
LEFT ATRIUM TO AORTIC ROOT RATIO: 1.06 NO UNITS
LEFT VENTRICLE CARDIAC INDEX: 2.5 L/MIN/M2
LEFT VENTRICLE CARDIAC OUTPUT: 5 L/MIN
LEFT VENTRICLE DIASTOLIC VOLUME INDEX: 43.9 CM3/M2 (ref 29–61)
LEFT VENTRICLE DIASTOLIC VOLUME: 87 CM3 (ref 46–106)
LEFT VENTRICLE HEART RATE: 75 BPM
LEFT VENTRICLE MASS INDEX: 72.1 G/M2
LEFT VENTRICLE SYSTOLIC VOLUME INDEX: 21.2 CM3/M2 (ref 8–24)
LEFT VENTRICLE SYSTOLIC VOLUME: 42 CM3 (ref 14–42)
LEFT VENTRICULAR INTERNAL DIMENSION IN DIASTOLE: 4.7 CM (ref 3.8–5.2)
LEFT VENTRICULAR INTERNAL DIMENSION IN SYSTOLE: 4 CM (ref 2.2–3.5)
LEFT VENTRICULAR MASS: 142.7 G
LEFT VENTRICULAR OUTFLOW TRACT MEAN GRADIENT: 2 MMHG
LEFT VENTRICULAR OUTFLOW TRACT MEAN VELOCITY: 74 CM/S
LEFT VENTRICULAR OUTFLOW TRACT PEAK GRADIENT: 4 MMHG
LEFT VENTRICULAR POSTERIOR WALL IN END DIASTOLE: 0.9 CM (ref 0.6–0.9)
LV STROKE VOLUME INDEX: 33.9 ML/M2
MITRAL VALVE E/A RATIO: 0.9
MV AVERAGE E/E' RATIO: 9 CM/S
MV DECELERATION TIME: 246 MS
MV E'TISSUE VEL-LAT: 8.58 CM/S
MV E'TISSUE VEL-MED: 5.75 CM/S
MV LATERAL E/E' RATIO: 7.5
MV MEDIAL E/E' RATIO: 11.2
MV PEAK A VELOCITY: 73.5 CM/S
MV PEAK E VELOCITY: 64.2 CM/S
NUC REST DIASTOLIC VOLUME INDEX: 3008 LBS
NUC REST SYSTOLIC VOLUME INDEX: 65 IN
POTASSIUM BLD-SCNC: 3.7 MMOL/L (ref 3.5–5)
PROT SERPL-MCNC: 7.6 G/DL (ref 6–8)
SODIUM SERPL-SCNC: 139 MMOL/L (ref 136–145)
TRICUSPID VALVE ANULAR PLANE SYSTOLIC EXCURSION: 2.1 CM
TRIGL SERPL-MCNC: 145 MG/DL

## 2021-06-04 ASSESSMENT — MIFFLIN-ST. JEOR: SCORE: 1428.64

## 2021-06-04 NOTE — TELEPHONE ENCOUNTER
RN cannot approve Refill Request    RN can NOT refill this medication PCP messaged that patient is overdue for Labs. Last office visit: 6/26/2019 Lucretia Raya MD Last Physical: Visit date not found Last MTM visit: Visit date not found Last visit same specialty: 11/4/2019 Nellie Smith CNP.  Next visit within 3 mo: Visit date not found  Next physical within 3 mo: Visit date not found      Demetri Cárdenas, [facility] Triage/Med Refill 12/22/2019    Requested Prescriptions   Pending Prescriptions Disp Refills     metFORMIN (GLUCOPHAGE XR) 500 MG 24 hr tablet 360 tablet 4     Sig: Take 4 tablets (2,000 mg total) by mouth daily with supper.       Metformin Refill Protocol Failed - 12/20/2019  1:34 PM        Failed - LFT or AST or ALT in last 12 months     Albumin   Date Value Ref Range Status   12/12/2018 4.8 3.5 - 5.0 g/dL Final     Bilirubin, Total   Date Value Ref Range Status   12/12/2018 0.3 0.0 - 1.0 mg/dL Final     Alkaline Phosphatase   Date Value Ref Range Status   12/12/2018 72 45 - 120 U/L Final     AST   Date Value Ref Range Status   12/12/2018 17 0 - 40 U/L Final     ALT   Date Value Ref Range Status   12/12/2018 15 0 - 45 U/L Final     Protein, Total   Date Value Ref Range Status   12/12/2018 7.9 6.0 - 8.0 g/dL Final                Passed - Blood pressure in last 12 months     BP Readings from Last 1 Encounters:   11/14/19 100/66             Passed - GFR or Serum Creatinine in last 6 months     GFR MDRD Non Af Amer   Date Value Ref Range Status   11/14/2019 60 (L) >60 mL/min/1.73m2 Final     GFR MDRD Af Amer   Date Value Ref Range Status   11/14/2019 >60 >60 mL/min/1.73m2 Final             Passed - Visit with PCP or prescribing provider visit in last 6 months or next 3 months     Last office visit with prescriber/PCP: 6/26/2019 OR same dept: 11/4/2019 Nellie Smith CNP OR same specialty: 11/4/2019 Nellie Smith CNP Last physical: Visit date not found Last MTM visit: Visit date not found          Next appt within 3 mo: Visit date not found  Next physical within 3 mo: Visit date not found  Prescriber OR PCP: Lucretia Raya MD  Last diagnosis associated with med order: 1. Type 2 diabetes mellitus (H)  - metFORMIN (GLUCOPHAGE XR) 500 MG 24 hr tablet; Take 4 tablets (2,000 mg total) by mouth daily with supper.  Dispense: 360 tablet; Refill: 4     If protocol passes may refill for 12 months if within 3 months of last provider visit (or a total of 15 months).           Passed - A1C in last 6 months     Hemoglobin A1c   Date Value Ref Range Status   11/01/2019 8.5 (H) 4.2 - 6.1 % Final               Passed - Microalbumin in last year      Microalbumin, Random Urine   Date Value Ref Range Status   06/26/2019 9.01 (H) 0.00 - 1.99 mg/dL Final

## 2021-06-04 NOTE — TELEPHONE ENCOUNTER
Discussed with pt.  Pt on Brilinta and aspirin, and is on furosemide.  Pt reports runny nose and she blew and it came out dark red.  Pt denies bleeding gums or bruising.  Pt denies dizziness or lightheadedness, pale color.  Suggested vaseline or nasal moisturizer and continue to monitor.  Pt will f/u with Dr Mabry 1/6/20.  -ross

## 2021-06-04 NOTE — TELEPHONE ENCOUNTER
----- Message -----  From: Liliana Hansen  Sent: 12/16/2019   2:46 PM CST  To: Mary Ahmadi RN    Caller: Patient    Primary cardiologist: Madina Onofre    Detailed reason for call: Patient stated that she had and angio and stents put in on 10/31/19 and she has been having bloody noses. She states that they have been more frequent. When she blows her nose she says that clumps of blood come out. She states that she takes aspirin, a blood thinner, and a blood clot medication and she is concerned that this is serious. Please advise.    New or active symptoms? Bloody noses    Best phone number: [phone]    Best time to contact: today    Ok to leave a detailed message? yes    Device? No

## 2021-06-05 ENCOUNTER — HEALTH MAINTENANCE LETTER (OUTPATIENT)
Age: 61
End: 2021-06-05

## 2021-06-05 VITALS
WEIGHT: 188 LBS | TEMPERATURE: 97.2 F | RESPIRATION RATE: 20 BRPM | BODY MASS INDEX: 31.32 KG/M2 | SYSTOLIC BLOOD PRESSURE: 100 MMHG | HEIGHT: 65 IN | OXYGEN SATURATION: 97 % | DIASTOLIC BLOOD PRESSURE: 58 MMHG | HEART RATE: 74 BPM

## 2021-06-05 NOTE — PROGRESS NOTES
Notes recorded by Roberto Mabry MD on 1/19/2020 at 6:57 PM CST  Lipid numbers are reasonable, ldl is close to call and triglycerides much improved, alt is mildly increased, suggest:prudent diet and exercise, continue with atorvastatin 80mg daily, angie ast and alt 1 month  mdg    AST and ALT ordered, pt will go to [facility] lab to have drawn in 30 days.  -ross

## 2021-06-05 NOTE — TELEPHONE ENCOUNTER
----- Message from Kaceyvíctor Mario sent at 1/10/2020  4:14 PM CST -----  Regarding: [facility] PT  General phone call:    Caller: Catina   Primary cardiologist: [facility]  Detailed reason for call: Patient calling to follow up on a medication from [facility] and has a question     Best phone number: [phone]  Best time to contact: any  Ok to leave a detailed message? yes  Device? no    Additional Info:

## 2021-06-06 NOTE — TELEPHONE ENCOUNTER
Refill Approved    Rx renewed per Medication Renewal Policy. Medication was last renewed on 6/4/19.    Day Lebron, [facility] Triage/Med Refill 3/5/2020     Requested Prescriptions   Pending Prescriptions Disp Refills     lisinopriL (PRINIVIL,ZESTRIL) 5 MG tablet 90 tablet 2     Sig: Take 1 tablet (5 mg total) by mouth daily.       Ace Inhibitors Refill Protocol Passed - 3/4/2020  4:08 PM        Passed - PCP or prescribing provider visit in past 12 months       Last office visit with prescriber/PCP: 6/26/2019 Lucretia Raya MD OR same dept: 11/4/2019 Nellie Smith CNP OR same specialty: 11/4/2019 Nellie Smith CNP  Last physical: Visit date not found Last MTM visit: Visit date not found   Next visit within 3 mo: Visit date not found  Next physical within 3 mo: Visit date not found  Prescriber OR PCP: Lucretia Raya MD  Last diagnosis associated with med order: 1. Type 2 diabetes mellitus (H)  - lisinopriL (PRINIVIL,ZESTRIL) 5 MG tablet; Take 1 tablet (5 mg total) by mouth daily.  Dispense: 90 tablet; Refill: 2    If protocol passes may refill for 12 months if within 3 months of last provider visit (or a total of 15 months).             Passed - Serum Potassium in past 12 months     Lab Results   Component Value Date    Potassium 3.5 01/17/2020             Passed - Blood pressure filed in past 12 months     BP Readings from Last 1 Encounters:   01/17/20 102/66             Passed - Serum Creatinine in past 12 months     Creatinine   Date Value Ref Range Status   01/17/2020 0.82 0.60 - 1.10 mg/dL Final

## 2021-06-06 NOTE — PROGRESS NOTES
----- Message -----  From: Roberto Mabry MD  Sent: 2/19/2020  10:09 PM CST  To: Sherie Burch RN    Hi  She is going to change the metoprolol to 12.5mg at bedtime, she is requesting 90 day supply of her meds  ty mdg    Metoprolol succinate rx updated.  -[facility]

## 2021-06-07 NOTE — TELEPHONE ENCOUNTER
DME blood pressure kit ordered.  Asked clinic to fax to [facility] at [phone].  -ra

## 2021-06-07 NOTE — TELEPHONE ENCOUNTER
----- Message from Liliana Hansen sent at 4/27/2020 10:47 AM CDT -----      Caller: Patient  Primary cardiologist: Dr. Mabry  Detailed reason for call: Patient stated that she needs to discuss the prescription for a BP machine. Please advise    Best phone number: [phone]  Best time to contact: any  Ok to leave a detailed message? yes  Device? no

## 2021-06-07 NOTE — PROGRESS NOTES
Review of Systems - History obtained from the patient  General ROS: positive for  - weight loss  Psychological ROS: negative  Ophthalmic ROS: negative  ENT ROS: positive for - nosebleeds  Hematological and Lymphatic ROS: positive for - easy bleeding and easy bruising  Respiratory ROS: positive for - cough and wheezing  Cardiovascular ROS: positive for - shortness of breath with activity  Gastrointestinal ROS: positive for - constipation and nausea  Genito-Urinary ROS: negative  Musculoskeletal ROS: positive for - joint pain    Patient would like a prescription for a BP machine  Neurological ROS: positive for - dizziness  Dermatological ROS: negative

## 2021-06-07 NOTE — TELEPHONE ENCOUNTER
Sent script for cuff to pharm after speaking with pt. She is seeing her PCP today and also encouraged her to get a written script for a medical [facility]ply store when they reopen as it maybe a better cuff than in a pharm. Pt will think about it.

## 2021-06-07 NOTE — TELEPHONE ENCOUNTER
----- Message from Roberto Mabry MD sent at 4/22/2020  7:43 PM CDT -----  Can we please send a script for a bp cuff  mdg

## 2021-06-07 NOTE — PATIENT INSTRUCTIONS - HE
It was nice to visit with you today.  You told me about some mild symptoms of shortness of breath with some associated wheezing.  He told me that you were not short of breath at rest or having trouble lying down but he has noticed this over the last month or so.  I offered an appointment our office but you said it was easier for you to go to your primary doctor's office and so I copied her on a note asking her if this could be arranged.  Please call or write to me on my chart if you have any additional symptoms or concerns or if you have not heard back about the appointment.  My nurse is Sherie and her number is [phone].

## 2021-06-07 NOTE — PROGRESS NOTES
Assessment/Plan:     Problem List Items Addressed This Visit        [facility] Cardiac Problems    Type 2 diabetes mellitus with complication, without long-term current use of insulin (H)    Relevant Orders    Glycosylated Hemoglobin A1c (Completed)    Comprehensive Metabolic Panel    Ischemic cardiomyopathy    Relevant Orders    BNP(B-type Natriuretic Peptide)      Other Visit Diagnoses     SOB (shortness of breath)    -  Primary    Relevant Orders    XR Chest 2 Views    Wheezing        Relevant Medications    fluticasone propion-salmeteroL (ADVAIR DISKUS) 250-50 mcg/dose DISKUS    Shortness of breath         Relevant Orders    BNP(B-type Natriuretic Peptide)        Catina is a pleasant 59-year-old female in no distress presenting today with symptoms of mild wheezing, dry cough and some shortness of breath with exertion.  Her chest x-ray is reassuring today with no signs of congestive heart failure or other abnormality.  Her lungs are clear to auscultation.  I did draw some additional lab work as she was due for diabetic blood work and I added a BNP as well to further evaluate the shortness of breath as she has a history of ischemic cardiomyopathy with a reduced ejection fraction when it was last measured at 49%.  I suspect with a seasonal component and symptoms of associated dry cough and wheezing that this may represent COPD.  I recommended we try empirically treating for this with Advair 1 puff twice daily for the next month.  She does have an appointment coming up with her primary in about a month to follow-up regarding her diabetes and I asked her to follow-up regarding this issue at that time as well.  However, I encouraged her to get in touch with me sooner if she has worsening symptoms.  I will get back to the patient following her lab results.    Subjective:       59 y.o. female presents for evaluation of concerns regarding wheezing, dry intermittent cough and some mild shortness of breath with exertion.   The patient recently met with her cardiologist and she had mentioned these symptoms.  The patient has not had any associated fever or cold symptoms.  She remembers last fall having similar issues with some wheezing and a slight dry cough.  However, at the end of October she ultimately was admitted to the hospital with acute coronary syndrome and a stent was placed.  The patient has done well since that time.  She states that it was a couple of family members who pointed out to her a couple weeks ago that she was having some wheezing.  She also noticed some slight increase shortness of breath with climbing stairs but also mentions that she is quite out of shape right now as she has been having some orthopedic hip issues.  The patient denies any chest pressure or chest pain.  She has had a dry intermittent cough.  The patient does not have a prior history for asthma but does have a prior smoking history and successfully quit following her heart attack last fall.  She does have 2 sisters with asthma as well as a sibling with a diagnosis of COPD.      Reviewed: The following portions of the patient's history were reviewed and updated as appropriate: allergies, current medications, past family history, past medical history, past social history, past surgical history and problem list.    Review of Systems  Pertinent items are noted in HPI.        Objective:     There were no vitals taken for this visit.  General appearance: alert, appears stated age and cooperative  Lungs: clear to auscultation bilaterally  Heart: regular rate and rhythm, S1, S2 normal, no murmur, click, rub or gallop     Chest Xray: normal      This note has been dictated using voice recognition software. Any grammatical or context distortions are unintentional and inherent to the software

## 2021-06-07 NOTE — TELEPHONE ENCOUNTER
RN cannot approve Refill Request    RN can NOT refill this medication Microalbumin out of range.. Last office visit: 6/26/2019 Lucretia Raya MD Last Physical: Visit date not found Last MTM visit: Visit date not found Last visit same specialty: 11/4/2019 Nellie Smith CNP.  Next visit within 3 mo: Visit date not found  Next physical within 3 mo: Visit date not found      Aye Sinha, [facility] Triage/Med Refill 3/19/2020    Requested Prescriptions   Pending Prescriptions Disp Refills     metFORMIN (GLUCOPHAGE XR) 500 MG 24 hr tablet 360 tablet 0     Sig: Take 4 tablets (2,000 mg total) by mouth daily with supper.       Metformin Refill Protocol Passed - 3/18/2020  5:37 PM        Passed - Blood pressure in last 12 months     BP Readings from Last 1 Encounters:   01/17/20 102/66             Passed - LFT or AST or ALT in last 12 months     Albumin   Date Value Ref Range Status   01/17/2020 4.4 3.5 - 5.0 g/dL Final     Bilirubin, Total   Date Value Ref Range Status   01/17/2020 0.5 0.0 - 1.0 mg/dL Final     Alkaline Phosphatase   Date Value Ref Range Status   01/17/2020 157 (H) 45 - 120 U/L Final     AST   Date Value Ref Range Status   02/18/2020 34 0 - 40 U/L Final     ALT   Date Value Ref Range Status   02/18/2020 45 0 - 45 U/L Final     Protein, Total   Date Value Ref Range Status   01/17/2020 7.6 6.0 - 8.0 g/dL Final                Passed - GFR or Serum Creatinine in last 6 months     GFR MDRD Non Af Amer   Date Value Ref Range Status   01/17/2020 >60 >60 mL/min/1.73m2 Final     GFR MDRD Af Amer   Date Value Ref Range Status   01/17/2020 >60 >60 mL/min/1.73m2 Final             Passed - Visit with PCP or prescribing provider visit in last 6 months or next 3 months     Last office visit with prescriber/PCP: Visit date not found OR same dept: 11/4/2019 Nellie Smith CNP OR same specialty: 11/4/2019 Nellie Smith CNP Last physical: Visit date not found Last MTM visit: Visit date not found          Next appt within 3 mo: Visit date not found  Next physical within 3 mo: Visit date not found  Prescriber OR PCP: Lucretia Raya MD  Last diagnosis associated with med order: 1. Type 2 diabetes mellitus (H)  - metFORMIN (GLUCOPHAGE XR) 500 MG 24 hr tablet; Take 4 tablets (2,000 mg total) by mouth daily with supper.  Dispense: 360 tablet; Refill: 0     If protocol passes may refill for 12 months if within 3 months of last provider visit (or a total of 15 months).           Passed - A1C in last 6 months     Hemoglobin A1c   Date Value Ref Range Status   11/01/2019 8.5 (H) 4.2 - 6.1 % Final               Passed - Microalbumin in last year      Microalbumin, Random Urine   Date Value Ref Range Status   06/26/2019 9.01 (H) 0.00 - 1.99 mg/dL Final

## 2021-06-08 NOTE — TELEPHONE ENCOUNTER
Yes, this was regarding previous testing supplies for onetouch verio as I just received a fax from the pharmacy regarding this.     Contour is the preferred brand and should hopefully be covered.

## 2021-06-08 NOTE — TELEPHONE ENCOUNTER
Can we find out more?  Per earlier conversation, insurance preferred Contour.  Is this incorrect?  Was pharmacist referring to prior testing supplies brand?

## 2021-06-08 NOTE — TELEPHONE ENCOUNTER
Prior Authorization Request  Who s requesting:  Pharmacy  Pharmacy Name and Location: [address] [place] [address]   Medication Name: ONETOUCH VERIO strips     Insurance Plan: [facility]  Insurance Member ID Number:  [id number]  CoverMyMeds Key: [id number]  Informed patient that prior authorizations can take up to 10 business days for response:   NA  Okay to leave a detailed message: Yes

## 2021-06-08 NOTE — TELEPHONE ENCOUNTER
Who is calling:  [facility]  Reason for Call:  The pharmacist calls stating that  she was informed by insurance that this prior authorization lapsed on 3/31/20, and would need a new prior authorization starting with Medicare Part D  Date of last appointment with primary care: 4/23/20  Okay to leave a detailed message: Yes

## 2021-06-08 NOTE — TELEPHONE ENCOUNTER
Central PA team  [phone]  Pool: HE PA MED ([id number])          PA has been initiated.       PA form completed and faxed insurance via Cover My Meds     Key:   [id number] - Rx #: [id number]     Medication:  OneTouch Verio strips    Insurance:  [employer] [place] Part [place]        Response will be received via fax and may take up to 5-10 business days depending on plan

## 2021-06-09 NOTE — PROGRESS NOTES
Catina Acuna is a 59 y.o. female who is being evaluated via a billable telephone visit.        Assessment/Plan:     1. Wheezing  Improved with Advair, advised continued symptoms.  Advised follow-up with cardiology as scheduled.  Unclear if potentially lisinopril could be contributing, will transition from lisinopril to losartan 25 mg daily.  Follow-up with me via my chart in about 2weeks.  She has a physical exam scheduled in August, advised that she keep that appointment for further evaluation and consideration of additional treatments if necessary.    2. Ischemic cardiomyopathy  Advised follow-up with cardiology as scheduled.  Continue aspirin, statin, risk factor modification.  - losartan (COZAAR) 25 MG tablet; Take 1 tablet (25 mg total) by mouth daily.  Dispense: 90 tablet; Refill: 1    3. Type 2 diabetes mellitus with complication, without long-term current use of insulin (H)  Due for follow-up A1c, will plan to do so at next follow-up visit.  Continue efforts at healthy lifestyle habits.  A1c goal of less than 8.  - losartan (COZAAR) 25 MG tablet; Take 1 tablet (25 mg total) by mouth daily.  Dispense: 90 tablet; Refill: 1    4. ERNIE (obstructive sleep apnea)  Advised that she obtain new CPAP machine to ensure obstructive sleep apnea is not contributing to her headaches and her dyspnea.    5. Chronic pain syndrome  Inadequate control with duloxetine, she discontinued.  May continue acetaminophen as needed.  Unclear if obstructive sleep apnea could be contributing.  Symptoms worsen with gabapentin.  Will do trial of Topamax twice daily to see if this can help with her headaches and her chronic pain.  - topiramate (TOPAMAX) 25 MG tablet; Take one tablet twice daily for 1 week, then 2 tabs twice daily  Dispense: 120 tablet; Refill: 2    6. Daily headache  Etiology not apparent.  Unclear how much inadequately treated obstructive sleep apnea could be contributing.  There could be a rebound component as well.  Has  transition from ibuprofen to acetaminophen which is reasonable.  No improvement with duloxetine, remain off this.  Initiate Topamax as above.  Update me via my chart in about 2 weeks.      There are no Patient Instructions on file for this visit.     Return in about 4 weeks (around 8/17/2020) for Annual physical.      Subjective:      Catina Acuna is a 59 y.o. female evaluated today by telephone visit for follow-up of multiple conditions.  Evaluation in April by Dr. Thomas in regards to wheeze, dry cough, exertional dyspnea.  Chest x-ray showed no acute findings.  Advair was initiated for possible COPD with underlying history of smoking.  Notes that her cough has become a bit better but has not resolved.  Wheezing has improved.  Wondering about other options.  Appointment with cardiologist in April, things are stable, has follow-up in August.  Known ischemic cardiomyopathy with ejection fraction 49% status post stenting October 2019.  Heart disease symptoms are resulted in a bloating sensation and right arm pain, has not had recurrence of those.  Known diabetes, following healthy diet limiting her salt.  Most recent A1c is less than 8 at 7.5, near due for follow-up.  She is quit smoking.  Notes ongoing mild cough at times that is intermittent.  History of obstructive sleep apnea, has been using her CPAP machine as it broke, needing a new one but notes that it did not seem to help with her headaches.  May try duloxetine which was not helpful for her headaches or her chronic pain syndrome.  Notes and so hard to get out of bed feel better after getting the cardiac stent.  Denies any overt neck pain.  In the past gabapentin has caused her nerve pain to worsen.  Notes that she is beginning to lose weight, is pleased with this.  Weight is down from 2 16-1 91 with healthy lifestyle habits.    Current Outpatient Medications   Medication Sig Dispense Refill     acetaminophen (TYLENOL) 500 MG tablet Take 500 mg by mouth  as needed for pain.       aspirin 81 mg chewable tablet Chew 1 tablet (81 mg total) daily. 30 tablet 0     atorvastatin (LIPITOR) 80 MG tablet Take 1 tablet (80 mg total) by mouth at bedtime. 90 tablet 2     blood glucose test (CONTOUR NEXT TEST STRIPS) strips Use 1 each As Directed daily. Dispense brand per patient's insurance at pharmacy discretion. 100 strip 3     blood-glucose meter (CONTOUR NEXT METER) Misc Use 1 each As Directed daily. 1 each 0     DM/p-ephed/acetaminoph/doxylam (ROSARIO-SELTZER PLUS COLD+FLU ORAL) Take 1 tablet by mouth 4 (four) times a day as needed.       fluticasone propion-salmeteroL (ADVAIR DISKUS) 250-50 mcg/dose DISKUS Inhale 1 puff 2 (two) times a day. 1 each 3     furosemide (LASIX) 40 MG tablet TAKE ONE TABLET BY MOUTH EVERY DAY 90 tablet 2     metFORMIN (GLUCOPHAGE XR) 500 MG 24 hr tablet Take 4 tablets (2,000 mg total) by mouth daily with supper. 360 tablet 3     metoprolol succinate (TOPROL-XL) 25 MG Take 0.5 tablets (12.5 mg total) by mouth at bedtime. 45 tablet 2     miscellaneous medical supply (BLOOD PRESSURE CUFF) [facility] to take BP daily. Pt states needs average size cuff. 1 each 1     nitroglycerin (NITROSTAT) 0.4 MG SL tablet Place 1 tablet (0.4 mg total) under the tongue every 5 (five) minutes as needed for chest pain. 25 tablet 1     ONETOUCH DELICA LANCETS 33 gauge Misc USE TO TEST BLOOD SUGAR ONCE DAILY 100 each 3     senna (SENOKOT) 8.6 mg tablet Take 1 tablet by mouth daily as needed for constipation.       ticagrelor (BRILINTA) 90 mg Tab Take 1 tablet (90 mg total) by mouth 2 (two) times a day. For 12 months 180 tablet 2     losartan (COZAAR) 25 MG tablet Take 1 tablet (25 mg total) by mouth daily. 90 tablet 1     topiramate (TOPAMAX) 25 MG tablet Take one tablet twice daily for 1 week, then 2 tabs twice daily 120 tablet 2     No current facility-administered medications for this visit.        Past Medical History, Family History, and Social History reviewed.  Past  Medical History:   Diagnosis Date     Depression      NSTEMI (non-ST elevated myocardial infarction) (H) 10/30/2019     Past Surgical History:   Procedure Laterality Date     ANKLE FRACTURE SURGERY       CV CORONARY ANGIOGRAM N/A 10/31/2019    Procedure: Coronary Angiogram;  Surgeon: Chip Escalera MD;  Location: [facility];  Service: Cardiology     Patient has no known allergies.  Family History   Problem Relation Age of Onset     Hypertension Mother      Heart failure Mother      Dementia Mother      Heart disease Mother      Hypertension Father      Heart disease Father      Diabetes Father      Emphysema Father      Heart disease Sister 58        4 vessel CBAG     Social History     Socioeconomic History     Marital status: Single     Spouse name: Not on file     Number of children: Not on file     Years of education: Not on file     Highest education level: Not on file   Occupational History     Not on file   Social Needs     Financial resource strain: Not on file     Food insecurity     Worry: Not on file     Inability: Not on file     Transportation needs     Medical: Not on file     Non-medical: Not on file   Tobacco Use     Smoking status: Current Some Day Smoker     Packs/day: 0.10     Years: 20.00     Pack years: 2.00     Types: Cigarettes     Smokeless tobacco: Never Used   Substance and Sexual Activity     Alcohol use: No     Comment: h/o alcohol abuse      Drug use: No     Sexual activity: Never     Comment: single   Lifestyle     Physical activity     Days per week: Not on file     Minutes per session: Not on file     Stress: Not on file   Relationships     Social connections     Talks on phone: Not on file     Gets together: Not on file     Attends Gnosticist service: Not on file     Active member of club or organization: Not on file     Attends meetings of clubs or organizations: Not on file     Relationship status: Not on file     Intimate partner violence     Fear of current or ex  "partner: Not on file     Emotionally abused: Not on file     Physically abused: Not on file     Forced sexual activity: Not on file   Other Topics Concern     Not on file   Social History Narrative     Not on file         Review of systems is as stated in HPI, and the remainder of the 10 system review is otherwise negative.    Objective:     There were no vitals filed for this visit. There is no height or weight on file to calculate BMI.    Vitals - Patient Reported 8/3/2020   Weight (Patient Reported) 191 lb     Alert female.  Able to speak in full sentences without evidence of respiratory distress, wheeze, or cough.  Affect is mildly flattened which is her baseline.  Remainder of exam is unable to be completed as this was a phone visit.    This note has been dictated using voice recognition software. Any grammatical or context distortions are unintentional and inherent to the the software.      The patient has been notified of following:     \"This telephone visit will be conducted via a call between you and your physician/provider. We have found that certain health care needs can be provided without the need for a physical exam.  This service lets us provide the care you need with a short phone conversation.  If a prescription is necessary we can send it directly to your pharmacy.  If lab work is needed we can place an order for that and you can then stop by our lab to have the test done at a later time.    Telephone visits are billed at different rates depending on your insurance coverage. During this emergency period, for some insurers they may be billed the same as an in-person visit.  Please reach out to your insurance provider with any questions.    If during the course of the call the physician/provider feels a telephone visit is not appropriate, you will not be charged for this service.\"    Patient has given verbal consent to a Telephone visit? Yes    What phone number would you like to be contacted " at?[phone]    Patient would like to receive their AVS by AVS Preference: MyChart.    Phone call duration:  34 minutes    Lucretia Raya MD

## 2021-06-09 NOTE — TELEPHONE ENCOUNTER
Fax received from [facility] to refill metformin   Patient has a visit scheduled on 7/20/2020 therefore a 30 day supply has been pended for approval.

## 2021-06-10 NOTE — TELEPHONE ENCOUNTER
RN cannot approve Refill Request    RN can NOT refill this medication med is not covered by policy/route to provider. Last office visit: 4/23/2020 Sophia Thomas MD Last Physical: Visit date not found Last MTM visit: Visit date not found Last visit same specialty: 4/23/2020 Sophia Thomas MD.  Next visit within 3 mo: Visit date not found  Next physical within 3 mo: Visit date not found      [facility], [facility] Triage/Med Refill 8/13/2020    Requested Prescriptions   Pending Prescriptions Disp Refills     ADVAIR DISKUS 250-50 mcg/dose DISKUS [Pharmacy Med Name: ADVAIR DISKUS 250-50MCG/DOSE AEPB] 60 each 3     Sig: INHALE ONE PUFF BY MOUTH TWICE A DAY       There is no refill protocol information for this order

## 2021-06-10 NOTE — TELEPHONE ENCOUNTER
Wellness Screening Tool  Symptom Screening:  Do you have one of the following NEW symptoms:    Fever (subjective or >100.0)?  No    A new cough?  No    Shortness of breath?  No     Chills? No     New loss of taste or smell? No     Generalized body aches? No     New persistent headache? No     New sore throat? No     Nausea, vomiting, or diarrhea?  No    Within the past 3 weeks, have you been exposed to someone with a known positive illness below:    COVID-19 (known or suspected)?  No    Chicken pox?  No    Mealses?  No    Pertussis?  No    Patient notified of visitor policy- They may have one person accompany them to their appointment, but they will need to wear a mask and will be screened upon arrival for symptoms: Yes  Pt informed to wear a mask: Yes  Pt notified if they develop any symptoms listed above, prior to their appointment, they are to call the clinic directly at [phone] for further instructions.  Yes  Patient's appointment status: Patient will be seen in clinic as scheduled on 8/3/20

## 2021-06-10 NOTE — PATIENT INSTRUCTIONS - HE
It was nice to visit with you today.  I am glad to hear that you are feeling well.  We talked about getting some blood pressure and pulse readings and updating me with any questions or concerns.  Please call my nurse Sherie at [phone] or write to me on my chart with any questions or concerns.  We should plan to follow-up in 4 months or sooner if there is any questions that arise.  Please do not stop any of your heart medicines without giving us a call or a note.

## 2021-06-12 NOTE — TELEPHONE ENCOUNTER
Reason contacted:  Medication refill  Information relayed:  Spoke with patient and she reports she is no longer taking Topamax.   Additional questions:  No  Further follow-up needed:  No  Okay to leave a detailed message:  No

## 2021-06-12 NOTE — PROGRESS NOTES
Assessment and Plan:     1. Routine general medical examination at a health care facility  At today's visit, we discussed lifestyle interventions to promote self-management and wellness, including maintenance of a healthy weight, healthy diet, regular physical activity and exercise, and falls prevention.  Advised Tdap and Shingrix, she will consider.  Seasonal influenza vaccine administered today.  She declines screening for colon cancer, cervical cancer, or breast cancer.      2. Type 2 diabetes mellitus with complication, without long-term current use of insulin (H)  Order placed for diabetes eye exam.  Otherwise meeting goals of care is with her ischemic cardiomyopathy her A1c goal is less than 8.  Advised efforts at regular physical activity as tolerated.  Encouraged continued efforts at healthy lifestyle habits.  Continue aspirin, statin, and losartan.  Will check comprehensive metabolic panel and monitoring of Metformin.  Will check urine microalbumin today to screen for diabetic nephropathy.  - Glycosylated Hemoglobin A1c; Standing  - Comprehensive Metabolic Panel  - Microalbumin, Random Urine  - Glycosylated Hemoglobin A1c    3. Ischemic cardiomyopathy  Continues to follow with cardiology.  Remains on aspirin and losartan.  Clinically is euvolemic.    4. Mixed hyperlipidemia  Continue atorvastatin.  Advised healthy lifestyle habits.  We will check comprehensive metabolic panel and monitoring of this medication and will check fasting lipids.  - Comprehensive Metabolic Panel  - Lipid Cascade FASTING    5. Delusional disorder (H)  This is chronic.  Patient has insight into her delusional disorder though she has a strong fixed belief.  She is not interested in further evaluation or treatment of this at this time.    6. Chronic pain syndrome  This is likely fibromyalgia though patient believes it is related to prior tanning bed incident.  Advised consideration of a magnesium supplement.  She has not had success  with Topamax, gabapentin, or duloxetine.    7. ERNIE (obstructive sleep apnea)  She declines a CPAP.  Encouraged consideration of mandibular advancement device.  She will consider.    8. Need for vaccination  - Influenza, Recombinant, Inj, Quadrivalent, PF, 18+YRS    9. 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  This is particularly true given her pituitary adenoma and sister.  - Growth Hormone  - Thyroid Cascade  - Prolactin    10. Weight loss  While intentional, will check thyroid cascade to ensure thyroid dysfunction is not contributing.  - Thyroid Cascade     The patient's current medical problems were reviewed.    The following health maintenance schedule was reviewed with the patient and provided in printed form in the after visit summary:   Health Maintenance   Topic Date Due     HEPATITIS C SCREENING  1960     HEART FAILURE ACTION PLAN  1961     DIABETIC FOOT EXAM  1961     MAMMOGRAM  1961     DIABETIC EYE EXAM  1960     Pneumococcal Vaccine: Pediatrics (0 to 5 Years) and At-Risk Patients (6 to 64 Years) (1 of 1 - PPSV23) 11/08/1967     HIV SCREENING  11/08/1976     TDAP ADULT ONE TIME DOSE  11/08/1979     ADVANCE CARE PLANNING  11/08/1979     COLORECTAL CANCER SCREENING  11/08/1979     HEPATITIS B VACCINES (1 of 3 - Risk 3-dose series) 11/08/1980     PAP SMEAR  11/08/1982     ZOSTER VACCINES (1 of 2) 11/08/2011     TD 18+ HE  08/26/2015     MICROALBUMIN  06/26/2020     BMP  10/23/2020     CBC  10/30/2020     LIPID  01/17/2021     A1C  04/12/2021     ALT  04/23/2021     MEDICARE ANNUAL WELLNESS VISIT  10/12/2021     TSH  Completed     INFLUENZA VACCINE RULE BASED  Completed        Subjective:   Chief Complaint: Catina Acuna is an 59 y.o. female here for an Annual  Wellness visit.   HPI: She has not had any significant or severe changes in her health in the last year.  Has not checked her blood sugars recently, does not recall what they were like prior to discontinuation of checking.  Compliant with Metformin.  Minimal exercises exercise creates significant muscle pain and tightness.  Denies any low sugars.  Remains on aspirin and atorvastatin.  She is not sure when she last her diabetes eye exam.  History of delusional disorder with significant health changes that occurred when she began using a tanning bed in 2005.  Describes memory changes, severe body pain that caused her to quit work and school, changes in her hair and that her hair turned gray and is perhaps a little thinner, feels like her feet increased to shoe sizes in size and have not stopped growing.  This has been very distressing for her.  Is previously seen a psychiatrist, therapist, and was taking sertraline but was not helpful and so she is no longer taking it.  She is had chronic pain in her muscles that she describes as nerve pain.  She is also had headaches.  She did not tolerate gabapentin or duloxetine.  We tried Topamax last year to help with headaches but actually made it worse.  She is not interested in further assistance.  History of coronary artery disease with ischemic cardiomyopathy, denies edema or changes in exercise tolerance.  Earlier this year was told by her sister and her daughter that she was wheezing, and was started on and her notes that this has been helpful.  She  Her smokes.    Since last year her sister was diagnosed with a human growth hormone producing pituitary adenoma, will be having surgery soon for this.  Patient is concerned that she and her twin sister oftentimes have reflective health conditions.  She notes her chronic headaches, hair changes as above, intentional weight loss but weight loss nonetheless, and change in size of her feet.  Occasionally will have hair growth on  her chin but nothing widespread.    She is not interested in cancer screening.  She is agreeable to flu shot today.    Review of Systems:   Please see above.  The rest of the review of systems are negative for all systems.    Patient Care Team:  Lucretia Raya MD as PCP - General  Papo Patel MD as Physician (Ophthalmology)  Sophia Thomas MD as Assigned PCP     Patient Active Problem List   Diagnosis     Delusional disorder (H)     Mixed hyperlipidemia     Chronic pain syndrome     Obesity (BMI 35.0-39.9) with comorbidity (H)     Type 2 diabetes mellitus with complication, without long-term current use of insulin (H)     Ischemic cardiomyopathy     History of tobacco abuse     ERNIE (obstructive sleep apnea)     Past Medical History:   Diagnosis Date     Depression      NSTEMI (non-ST elevated myocardial infarction) (H) 10/30/2019      Past Surgical History:   Procedure Laterality Date     ANKLE FRACTURE SURGERY       CV CORONARY ANGIOGRAM N/A 10/31/2019    Procedure: Coronary Angiogram;  Surgeon: Chip Escalera MD;  Location: [facility];  Service: Cardiology      Family History   Problem Relation Age of Onset     Hypertension Mother      Heart failure Mother      Dementia Mother      Heart disease Mother      Hypertension Father      Heart disease Father      Diabetes Father      Emphysema Father      Heart disease Sister 58        4 vessel CBAG      Social History     Socioeconomic History     Marital status: Single     Spouse name: Not on file     Number of children: Not on file     Years of education: Not on file     Highest education level: Not on file   Occupational History     Not on file   Social Needs     Financial resource strain: Not on file     Food insecurity     Worry: Not on file     Inability: Not on file     Transportation needs     Medical: Not on file     Non-medical: Not on file   Tobacco Use     Smoking status: Former Smoker     Packs/day: 0.10     Years: 20.00      Pack years: 2.00     Types: Cigarettes     Smokeless tobacco: Never Used   Substance and Sexual Activity     Alcohol use: No     Comment: h/o alcohol abuse      Drug use: No     Sexual activity: Never     Comment: single   Lifestyle     Physical activity     Days per week: Not on file     Minutes per session: Not on file     Stress: Not on file   Relationships     Social connections     Talks on phone: Not on file     Gets together: Not on file     Attends [employer]ism service: Not on file     Active member of club or organization: Not on file     Attends meetings of clubs or organizations: Not on file     Relationship status: Not on file     Intimate partner violence     Fear of current or ex partner: Not on file     Emotionally abused: Not on file     Physically abused: Not on file     Forced sexual activity: Not on file   Other Topics Concern     Not on file   Social History Narrative     Not on file      Current Outpatient Medications   Medication Sig Dispense Refill     acetaminophen (TYLENOL) 500 MG tablet Take 500 mg by mouth as needed for pain.       ADVAIR DISKUS 250-50 mcg/dose DISKUS INHALE ONE PUFF BY MOUTH TWICE A DAY 60 each 3     aspirin 81 mg chewable tablet Chew 1 tablet (81 mg total) daily. 30 tablet 0     atorvastatin (LIPITOR) 80 MG tablet Take 1 tablet (80 mg total) by mouth at bedtime. 90 tablet 2     blood glucose test (CONTOUR NEXT TEST STRIPS) strips Use 1 each As Directed daily. Dispense brand per patient's insurance at pharmacy discretion. 100 strip 3     blood-glucose meter (CONTOUR NEXT METER) Misc Use 1 each As Directed daily. 1 each 0     DM/p-ephed/acetaminoph/doxylam (ROSARIO-SELTZER PLUS COLD+FLU ORAL) Take 1 tablet by mouth 4 (four) times a day as needed.       furosemide (LASIX) 40 MG tablet TAKE ONE TABLET BY MOUTH EVERY DAY 90 tablet 2     losartan (COZAAR) 25 MG tablet Take 1 tablet (25 mg total) by mouth daily. 90 tablet 1     metFORMIN (GLUCOPHAGE XR) 500 MG 24 hr tablet Take 4  "tablets (2,000 mg total) by mouth daily with supper. 360 tablet 3     metoprolol succinate (TOPROL-XL) 25 MG Take 0.5 tablets (12.5 mg total) by mouth at bedtime. 45 tablet 2     miscellaneous medical supply (BLOOD PRESSURE CUFF) [facility] Pt to take BP daily. Pt states needs average size cuff. 1 each 1     nitroglycerin (NITROSTAT) 0.4 MG SL tablet Place 1 tablet (0.4 mg total) under the tongue every 5 (five) minutes as needed for chest pain. 25 tablet 1     ONETOUCH DELICA LANCETS 33 gauge Misc USE TO TEST BLOOD SUGAR ONCE DAILY 100 each 3     senna (SENOKOT) 8.6 mg tablet Take 1 tablet by mouth daily as needed for constipation.       ticagrelor (BRILINTA) 90 mg Tab Take 1 tablet (90 mg total) by mouth 2 (two) times a day. For 12 months 180 tablet 2     No current facility-administered medications for this visit.       Objective:   Vital Signs:   Visit Vitals  /58   Pulse 74   Temp 97.2  F (36.2  C) (Tympanic)   Resp 20   Ht 5' 5\" (1.651 m)   Wt 188 lb (85.3 kg)   SpO2 97%   BMI 31.28 kg/m           VisionScreening:  No exam data present     PHYSICAL EXAM  Physical Examination: General appearance - alert, well appearing, and in no distress, oriented to person, place, and time and normal appearing weight  Mental status - alert, oriented to person, place, and time, normal mood, behavior, speech, dress, motor activity, and thought processes  Eyes - pupils equal and reactive, extraocular eye movements intact  Ears - bilateral TM's and external ear canals normal  Nose - normal and patent, no erythema, discharge or polyps  Mouth - mucous membranes moist, pharynx normal without lesions  Neck - supple, no significant adenopathy  Lymphatics - no palpable lymphadenopathy, no hepatosplenomegaly  Chest - clear to auscultation, no wheezes, rales or rhonchi, symmetric air entry  Heart - normal rate, regular rhythm, normal S1, S2, no murmurs, rubs, clicks or gallops  Abdomen - soft, nontender, nondistended, no masses or " organomegaly  Breasts - breasts appear normal, no suspicious masses, no skin or nipple changes or axillary nodes  Neurological - alert, oriented, normal speech, no focal findings or movement disorder noted  Musculoskeletal - no joint tenderness, deformity or swelling  Extremities - peripheral pulses normal, no pedal edema, no clubbing or cyanosis  Skin - normal coloration and turgor, no rashes, no suspicious skin lesions noted      Assessment Results 10/12/2020   Activities of Daily Living No help needed   Instrumental Activities of Daily Living 2-4 - Moderate impairment   Mini Cog Total Score 5   Some recent data might be hidden     A Mini-Cog score of 0-2 suggests the possibility of dementia, score of 3-5 suggests no dementia      Identified Health Risks:     The patient was provided with suggestions to help her develop a healthy physical lifestyle.   She is at risk for lack of exercise and has been provided with information to increase physical activity for the benefit of her well-being.  The patient reports that she has difficulty with instrumental activities of daily living.  She was provided with a list of local organizations that provide support services and advised to make a follow up appointment in 6 weeks to address this further.   Information on urinary incontinence and treatment options given to patient.  The patient was provided with suggestions to help her develop a healthy emotional lifestyle.   The patient s PHQ-9 score is consistent with moderate depression.  She was provided with information regarding depression and was advised to schedule a follow up appointment in 6 weeks to further address this issue.  Patient's advanced directive was discussed and I am comfortable with the patient's wishes.

## 2021-06-12 NOTE — TELEPHONE ENCOUNTER
Medication Request  Medication name: Topiramate 25 mg, one tablet two times a day for 1 week then two tablets two times a day, #120  [facility]ed [facility]  Reason for request: N/A - electronic request   When did you use medication last?:  N/A - electronic request   Patient offered appointment:  N/A - electronic request  Okay to leave a detailed message: no

## 2021-06-12 NOTE — TELEPHONE ENCOUNTER
Refill Approved    Rx renewed per Medication Renewal Policy. Medication was last renewed on 11/30/19.    Day [facility], [facility] Triage/Med Refill 11/9/2020     Requested Prescriptions   Pending Prescriptions Disp Refills     ONETOUCH DELICA PLUS LANCET 33 gauge Misc [Pharmacy Med Name: ONETOUCH DELICA PLUS LANCET 33G] 100 each 3     Sig: USE TO TEST BLOOD SUGAR ONCE DAILY       Diabetic Supplies Refill Protocol Passed - 11/6/2020  4:25 AM        Passed - Visit with PCP or prescribing provider visit in last 6 months     Last office visit with prescriber/PCP: Visit date not found OR same dept: 4/23/2020 Sophia Thomas MD OR same specialty: 4/23/2020 Sophia Thomas MD  Last physical: Visit date not found Last MTM visit: Visit date not found   Next visit within 3 mo: Visit date not found  Next physical within 3 mo: Visit date not found  Prescriber OR PCP: Kai Marquez MD  Last diagnosis associated with med order: 1. Type 2 diabetes mellitus with complication, without long-term current use of insulin (H)  - ONETOUCH DELICA PLUS LANCET 33 gauge Misc [Pharmacy Med Name: ONETOUCH DELICA PLUS LANCET 33G]; USE TO TEST BLOOD SUGAR ONCE DAILY  Dispense: 100 each; Refill: 3    If protocol passes may refill for 12 months if within 3 months of last provider visit (or a total of 15 months).             Passed - A1C in last 6 months     Hemoglobin A1c   Date Value Ref Range Status   10/12/2020 7.3 (H) <=5.6 % Final     Comment:     Normal <5.7% Prediabete 5.7-6.4% Diabletes 6.5% or higher - adopted from ADA consensus guidelines

## 2021-06-13 NOTE — PROGRESS NOTES
Review of Systems - History obtained from the patient  General ROS: positive for  - weight loss  Psychological ROS: negative  Ophthalmic ROS: negative  ENT ROS: negative  Allergy and Immunology ROS: negative  Hematological and Lymphatic ROS: negative  Endocrine ROS: negative  Respiratory ROS: negative  Cardiovascular ROS: negative  Gastrointestinal ROS: positive for - diarrhea  Genito-Urinary ROS: positive for - nocturia  Musculoskeletal ROS: positive for - achy arms  Neurological ROS: negative  Dermatological ROS: negative  BP not taken at this visit

## 2021-06-13 NOTE — PATIENT INSTRUCTIONS - HE
It was nice to visit with you today.  We discussed continuing with your current combination of medications but letting me know towards the end of January when you are running low on Brilinta so that we can make additional decisions about blood thinners.  Please keep me updated about any chest pain or arm pain or increasing shortness of breath or weight gain or fluid retention.  You can reach me either by calling my nurse Sherie at [phone] or writing to me on my chart.  We also talked about adding a medicine called Zetia 10 mg daily to try to bring down your cholesterol even further which has been shown to be beneficial in your history of stenting and coronary artery disease.  Please monitor for any stomach upset or other symptoms please give us a call with any questions.

## 2021-06-14 NOTE — TELEPHONE ENCOUNTER
Refill Approved    Rx renewed per Medication Renewal Policy. Medication was last renewed on 7/20/20.    [facility], [facility] Triage/Med Refill 1/12/2021     Requested Prescriptions   Pending Prescriptions Disp Refills     losartan (COZAAR) 25 MG tablet 90 tablet 1     Sig: Take 1 tablet (25 mg total) by mouth daily.       Angiotensin Receptor Blocker Protocol Passed - 1/12/2021 11:05 AM        Passed - PCP or prescribing provider visit in past 12 months       Last office visit with prescriber/PCP: 6/26/2019 Lucretia Raya MD OR same dept: 4/23/2020 Sophia Thomas MD OR same specialty: 4/23/2020 Sophia Thomas MD  Last physical: 10/12/2020 Last MTM visit: Visit date not found   Next visit within 3 mo: Visit date not found  Next physical within 3 mo: Visit date not found  Prescriber OR PCP: Lucretia Raya MD  Last diagnosis associated with med order: 1. Type 2 diabetes mellitus with complication, without long-term current use of insulin (H)  - losartan (COZAAR) 25 MG tablet; Take 1 tablet (25 mg total) by mouth daily.  Dispense: 90 tablet; Refill: 1    2. Ischemic cardiomyopathy  - losartan (COZAAR) 25 MG tablet; Take 1 tablet (25 mg total) by mouth daily.  Dispense: 90 tablet; Refill: 1    If protocol passes may refill for 12 months if within 3 months of last provider visit (or a total of 15 months).             Passed - Serum potassium within the past 12 months     Lab Results   Component Value Date    Potassium 4.9 10/12/2020             Passed - Blood pressure filed in past 12 months     BP Readings from Last 1 Encounters:   10/12/20 100/58             Passed - Serum creatinine within the past 12 months     Creatinine   Date Value Ref Range Status   10/12/2020 0.87 0.60 - 1.10 mg/dL Final

## 2021-06-14 NOTE — PATIENT INSTRUCTIONS - HE
Catina Acuna,    It was a pleasure to see you today at the [facility].     My recommendations after this visit include:    - No medications changes made today    - Follow up with Dr. Mabry in 2 months     - Please get your blood work (Fasting lipid and CMP-kidney and liver test) done in the next 3-4 weeks as recommended    - Please call [phone], if you have any questions or concerns     Lakesha Mejia, CNP

## 2021-06-14 NOTE — PROGRESS NOTES
Assessment and Plan:     1. Type 2 diabetes mellitus  Patient's A1c is 12%.  Will start metformin 500 mg.  Will gradually increase dose by having her take 1 tablet in the a.m. for 5 days then increase to 1 tablet twice daily for 5 days.  She will then increase to 2 tablets in the morning, 1 tablet in the evening for 5 days and then increase to 2 tablets by mouth twice daily.  Will see if she tolerates the medication as she has a history of not tolerating medication in the past.  Will start lisinopril for renal protection.  Will start simvastatin 10 mg daily for cardiovascular risk.  Will refer to diabetes education.  She will follow-up with Dr. Raya in 3 months.  I encouraged her to schedule a yearly eye exam.  - metFORMIN (GLUCOPHAGE) 500 MG tablet; Take 2 tablets (1,000 mg total) by mouth 2 (two) times a day with meals.  Dispense: 180 tablet; Refill: 0  - lisinopril (PRINIVIL,ZESTRIL) 5 MG tablet; Take 1 tablet (5 mg total) by mouth daily.  Dispense: 90 tablet; Refill: 0  - simvastatin (ZOCOR) 10 MG tablet; Take 1 tablet (10 mg total) by mouth every evening.  Dispense: 90 tablet; Refill: 0  - Microalbumin, Random Urine  - Ambulatory referral to Diabetes Education (Existing Diagnosis)    2. Hypoglycemia   - Glycosylated Hemoglobin A1c    3. Glucosuria  - Glycosylated Hemoglobin A1c    4. Lipid screening  - Lipid Cascade    5. Yeast vaginitis  Wet prep is positive for yeast.  Will start Diflucan 150 mg once.  She will use over-the-counter Monistat externally.  - fluconazole (DIFLUCAN) 150 MG tablet; Take 1 tablet (150 mg total) by mouth once for 1 dose.  Dispense: 1 tablet; Refill: 1    6. Acute vaginitis  - Wet Prep, Vaginal    7. Dysuria  Suspect dysuria is from yeast vaginitis.  Will wait for urine culture results.  - Urinalysis-UC if Indicated  - Culture, Urine    8. Delusional disorder  9. Other chronic pain  Patient is not currently seeing any providers regarding her chronic pain.  She also complains of  memory issues.  I offered a referral to neuropsych for further evaluation, but she declines.    10. Medication management  - Comprehensive Metabolic Panel    The patient is content with the plan and will follow-up in 3 months for medication management with Dr. Raya or sooner with any further concerns. I spent 45 minutes with the patient with greater than 50% spent discussing symptoms, treatment options, and coordination of care.         Subjective:     Catina is a 57 y.o. female presenting to the clinic for concerns for dysuria for 2 weeks.  Patient thought initially she had a yeast infection.  She has been applying over-the-counter antifungal cream with no relief.  Patient has a history of frequent episodes of this.  Yesterday, she developed swelling within her labias.  Patient feels a sharp razor blade sensation when she urinates.  She has been applying cool compresses.  She is unsure if she has any vaginal discharge and odor.  She denies hematuria.  She has had urinary urgency and frequency.  She has a history of hypoglycemia.  She has not been tested for diabetes since 2010.  She has tried to avoid seeing providers.    Review of Systems: Patient has been experiencingchronic pain since 2005.  Patient states she was in a tanning bed and believes that it affected her neurological symptoms.  Patient has memory issues, headaches, depression.  She saw psychiatry in the past and did not respond well to psychotropic medications.  She has a history of hyperglycemia.  She was evaluated at [facility] and was told that she is delusional.    Social History     Social History     Marital status: Single     Spouse name: N/A     Number of children: N/A     Years of education: N/A     Occupational History     Not on file.     Social History Main Topics     Smoking status: Current Some Day Smoker     Years: 20.00     Smokeless tobacco: Not on file     Alcohol use Not on file     Drug use: Not on file     Sexual activity: Not on file  "    Other Topics Concern     Not on file     Social History Narrative     No narrative on file       Active Ambulatory Problems     Diagnosis Date Noted     Depression      Peripheral Neuropathy      Fatigue      Abdominal Pain In The [place] (Epigastric)      Delusional Disorder      Resolved Ambulatory Problems     Diagnosis Date Noted     No Resolved Ambulatory Problems     No Additional Past Medical History       No family history on file.    Objective:     /80  Pulse (!) 108  Ht 5' 5\" (1.651 m)  Wt (!) 227 lb 1.6 oz (103 kg)  SpO2 98%  BMI 37.79 kg/m2    Patient is alert, in no obvious distress.   Skin: Warm, dry.    Lungs:  Clear to auscultation. Respirations even and unlabored.  No wheezing or rales noted.   Heart:  Regular rate and rhythm.  No murmurs, S3, S4, gallops, or rubs.    Abdomen: Soft, nontender.  No organomegaly. Bowel sounds normoactive. No guarding or masses noted.   :  Patient's labias are erythematous and swollen.  Normal vaginal mucosa.  Slight white vaginal discharge is present.      LABORATORY: Urinalysis, urine culture, wet prep, hemoglobin A1c ordered.  Urinalysis shows moderate blood, glucose greater than 1000 mg/dL.            "

## 2021-06-14 NOTE — PROGRESS NOTES
Review of systems done with patient over the phone and all within normal limits. She was unable to get her vitals today.

## 2021-06-15 NOTE — TELEPHONE ENCOUNTER
Discussed recommendations with pt.  Pt verbalized understanding and had no questions.  Message sent to scheduling for telephone visit with Dr Bear winter

## 2021-06-15 NOTE — TELEPHONE ENCOUNTER
[facility] for pt to call [phone] to discuss.  -[facility]    ----- Message -----  From: Roberto Mabry MD  Sent: 2/9/2021   2:21 PM CST  To: Sherie Burch RN    The decision really is how long to continue her blood thinner.  I think that if she is still smoking and I would favor changing her to Plavix for 6 months in addition to aspirin.  Ideally would like to visit with her but if she is of the opinion that this is not helpful to her then ultimately insert decision but if we can determine whether she is still smoking and whether she is having any bleeding issues with the current combination of Brilinta and aspirin and how much Brilinta she has left.  Thank you mdg    ----- Message from Sarah Hopper V sent at 2/9/2021  2:04 PM CST -----  Regarding: RE: manpreet pt  I spoke with Catina to schedule this follow up.  She said she did want to come into the clinic and she did not feel this appointment is necessary since she just saw Lakesha and she feels fine.  She says if Dr. Mabry is worried about her arm pain she said she is pretty sure it is just arthritis.  She said these apopintments are adding up financially for her and she really doesn't want to do a video visit.  I told Catina I would pass this message along to his nurse and if Dr. Mabry said this appointment is absolutely necessary that someone call her to discuss this.      Thank you!     ----- Message -----  From: Roberto Mabry MD  Sent: 2/7/2021   5:42 PM CST  To: Sherie Burch RN, Lakesha Mejia NP, #    Please arrange an in office appt with me asap  ty mdg

## 2021-06-15 NOTE — TELEPHONE ENCOUNTER
Recommendations discussed with pt.  Pt verbalized understanding.    Pt still smokes occasionally, not daily, depends on who she is with and what they are doing.  Pt still has a bottle and a half of Brilinta left, she is not having any bleeding issues.  Pt would be open to switching to Plavix and aspirin when her Brilinta is gone.    Dr Mabry - what would you like to do?  -[facility]

## 2021-06-15 NOTE — PROGRESS NOTES
Assessment: Catina is here alone today for her follow-up.  Stated she has not been feeling well, with no appetite.  Her appointment was at 2:30 and she had nothing to eat today.    She is taking Metformin 1000mg BID.  Stated she has not had trouble with diarrhea, but does feel bloated and constipated on and off.  Discussed this could be from Metformin, but not very common.  She thinks it might be from 1 of her other medications, and suggested she discuss that with her primary.    She is not ready to talk about checking her blood sugars at home.  Stated she has severe pain in her hands and is very concerned about the increased pain from poking fingers.  Stated she would talk more with her primary about this.    She is not active due to her pain.    Catina has made some changes to her meal plan.    She has been eating more salads with less chicken and less croutons.  She has been eating an egg, either hard-boiled or fried for breakfast.  She has been eating more raw vegetables.    Plan: Follow-up with primary for blood work.  Discuss SMBG at home.  Follow-up in 6 weeks.    Subjective and Objective:      Catina Acuna is referred by Deya Jon CNP for Diabetes Education.     Lab Results   Component Value Date    HGBA1C 12.2 (H) 12/07/2017         Current diabetes medications:  Metformin 1000mg BID    Goals     None          Follow up:   Primary care visit      Education:     Monitoring   Meter (per above goals): Assessed and Discussed  Monitoring: Not addressed  BG goals: Not addressed    Nutrition Management  Nutrition Management: Assessed and Discussed  Weight: Assessed and Discussed  Portions/Balance: Assessed and Discussed  Carb ID/Count: Assessed and Discussed  Label Reading: Not addressed  Heart Healthy Fats: Assessed and Discussed  Menu Planning: Assessed and Discussed  Dining Out: Not addressed  Physical Activity: Assessed and Discussed  Medications: Assessed and Discussed  Orals: Assessed and  Discussed  Injected Medications: Not addressed   Storage/Exp:Not addressed   Site Rotation: Not addressed   Sites Assessed: no    Diabetes Disease Process: Assessed and Discussed    Acute Complications: Prevent, Detect, Treat:  Hypoglycemia: Assessed and Discussed  Hyperglycemia: Assessed and Discussed  Sick Days: Needs instruction/review at follow-up and Not addressed  Driving: Needs instruction/review at follow-up and Not addressed    Chronic Complications  Foot Care:Needs instruction/review at follow-up and Not addressed  Skin Care: Needs instruction/review at follow-up and Not addressed  Eye: Needs instruction/review at follow-up and Not addressed  ABC: Needs instruction/review at follow-up and Not addressed  Teeth:Needs instruction/review at follow-up and Not addressed  Goal Setting and Problem Solving: Assessed and Discussed  Barriers: Assessed and Discussed  Psychosocial Adjustments: Assessed and Discussed      Time spent with the patient: 30 minutes for diabetes education and counseling.   Previous Education: yes  Visit Type:DSMT  Hours Remaining: DSMT 8.5 and MNT 3      Meme Bolanos  2/6/2018

## 2021-06-15 NOTE — TELEPHONE ENCOUNTER
That is correct.  Pt is declining an in office visit, and if she does need a visit - she prefers it to be telephone.  Pt will have labs done at PCP.  Pt is declining visit d/t cost and covid.  Dr Mabry updated.  [facility]

## 2021-06-15 NOTE — TELEPHONE ENCOUNTER
I just want to understand that she is not willing to see me in the office in the next month?  I reviewed this in person and for me to examine her.mdg

## 2021-06-16 PROBLEM — Z87.891 HISTORY OF TOBACCO ABUSE: Status: ACTIVE | Noted: 2019-11-14

## 2021-06-16 PROBLEM — E78.2 MIXED HYPERLIPIDEMIA: Status: ACTIVE | Noted: 2017-12-17

## 2021-06-16 PROBLEM — E66.01 MORBID OBESITY (H): Status: ACTIVE | Noted: 2018-09-06

## 2021-06-16 PROBLEM — G89.4 CHRONIC PAIN SYNDROME: Status: ACTIVE | Noted: 2018-03-13

## 2021-06-16 PROBLEM — G47.33 OSA (OBSTRUCTIVE SLEEP APNEA): Status: ACTIVE | Noted: 2020-08-09

## 2021-06-16 PROBLEM — E11.8 TYPE 2 DIABETES MELLITUS WITH COMPLICATION, WITHOUT LONG-TERM CURRENT USE OF INSULIN (H): Status: ACTIVE | Noted: 2018-09-06

## 2021-06-16 PROBLEM — I25.5 ISCHEMIC CARDIOMYOPATHY: Status: ACTIVE | Noted: 2019-10-31

## 2021-06-16 PROBLEM — I51.89 MILD LEFT VENTRICULAR SYSTOLIC DYSFUNCTION: Status: ACTIVE | Noted: 2021-01-28

## 2021-06-16 PROBLEM — I25.10 CORONARY ARTERY DISEASE INVOLVING NATIVE CORONARY ARTERY OF NATIVE HEART WITHOUT ANGINA PECTORIS: Status: ACTIVE | Noted: 2021-01-28

## 2021-06-16 NOTE — PATIENT INSTRUCTIONS - HE
It was nice to visit with you by telephone today.  I am glad to hear that you are feeling well and has been able to stop smoking.  We talked about having some follow-up laboratory studies and an echocardiogram and then making decisions about stopping the Brilinta.  Please let me know if you have any symptoms or chest discomfort, increased bruising or bleeding and will be in touch pending the results of the tests.  Please write on my chart or call my nurse Sherie with any heart related questions.  Her number is [phone]

## 2021-06-16 NOTE — TELEPHONE ENCOUNTER
----- Message from Leia Rowe sent at 4/19/2021  3:33 PM CDT -----  Regarding: [facility] PT / CODE FOR ECHO  General phone call:    Caller: Catina Lay     Primary cardiologist: [facility]    Detailed reason for call: Pt is requesting a call back regarding the code for her echo that she has coming up on 5/10/21.     Best phone number: [phone]    Best time to contact: Anytime     Ok to leave a detailed message? Yes     Device? No     Additional Info:

## 2021-06-16 NOTE — PROGRESS NOTES
Assessment/Plan:     1. Type 2 diabetes mellitus  A1c pending at time of visit, significantly improved, not quite at goal but patient also has not been taking high-dose as long as ideal.  We will plan to continue medication at same dose, follow-up in 3 months.  She remains on ACE inhibitor and statin.  Will add aspirin 81 mg daily.  Will check comprehensive metabolic panel in monitoring of medication changes.  Referral placed ophthalmology for annual diabetes eye exam.  - Glycosylated Hemoglobin A1c; Standing  - Glycosylated Hemoglobin A1c  - Comprehensive Metabolic Panel  - Ambulatory referral to Ophthalmology    2. Chronic pain syndrome  Chronic and stable.  I encouraged her to consider fibromyalgia as underlying condition, she states she does not believe she has this condition.    3. Delusional disorder  Patient aware of diagnosis, believes firmly that symptoms are related to previous tanning bed exposure.  I discussed with her that at this time there is no medical explanation for her symptoms, that while I cannot say whether this truly was relating to 10 in bed in a physical manner over this is more of a mental health disease, I am happy to continue to provide her care in a nonjudgmental fashion,      Subjective:      Catina Acuna is a 57 y.o. female presented to clinic today for follow-up of new diagnosis of type 2 diabetes made at visit in December.  Was seen for dysuria and yeast vaginitis, noted to have a glucose urea, A1c at the time noted to be 12.  At that visit she was initiated on metformin 500 mg, increase this to 500 mg twice daily, and it was not until about a month ago that she really increase dose to 1000 mg twice daily.  She is noting some mild increase in her chronic nausea, worse if she forgets to eat or if she overeats, eating 2-3 meals daily.  She is unwilling to check her blood sugars due to sensitivity of her fingertips.  Otherwise feels like she is tolerating it well.  Noting no side  effects to lisinopril or atorvastatin started at last visit either.  She met with diabetes educator in regards to diet, feels that comfortable with this at this time.  She has memory difficulties, she is using a pillbox to ensure that she is taking her medications adequately.  She declines further assistance with her memory.  Previous history of diagnosis of delusional disorder.  Patient believes that she has had significant memory difficulties, significant vision changes, and significant chronic pain and sensitivity of her nerves with complete lack of sleep since tanning bed injury many years ago.  Has had previous evaluations by neurology and ultimately at [facility] with diagnosis of delusional disorder.  Patient believes this is a physical condition but also states she is able to accept her condition and function despite and is not currently interested in pursuing further evaluation.  She is considered the diagnosis of fibromyalgia but does not believe she has this and is not interested in further assessment or treatment at this time.  In the past has taken NSAIDs for her chronic pain but this caused an elevation in her creatinine and so she remains off all NSAIDs.  She has not yet seen ophthalmology in regards to her diabetes.    She lives with her adult son who is autistic.  Her daughter is a nurse.    Current Outpatient Prescriptions   Medication Sig Dispense Refill     atorvastatin (LIPITOR) 10 MG tablet Take 1 tablet (10 mg total) by mouth at bedtime. 90 tablet 0     ibuprofen (ADVIL,MOTRIN) 200 MG tablet Take 200 mg by mouth every 6 (six) hours as needed for pain.       lisinopril (PRINIVIL,ZESTRIL) 5 MG tablet Take 1 tablet (5 mg total) by mouth daily. 90 tablet 0     metFORMIN (GLUCOPHAGE) 500 MG tablet TAKE TWO TABLETS BY MOUTH TWICE A DAY WITH A MEAL 360 tablet 0     aspirin 81 MG EC tablet Take 1 tablet (81 mg total) by mouth daily. 90 tablet 4     No current facility-administered medications for  "this visit.        Past Medical History, Family History, and Social History reviewed.  Past Medical History:   Diagnosis Date     Depression      Past Surgical History:   Procedure Laterality Date     ANKLE FRACTURE SURGERY       Review of patient's allergies indicates no known allergies.  Family History   Problem Relation Age of Onset     Hypertension Mother      Heart failure Mother      Dementia Mother      Heart disease Mother      Hypertension Father      Heart disease Father      Diabetes Father      Emphysema Father      Social History     Social History     Marital status: Single     Spouse name: N/A     Number of children: N/A     Years of education: N/A     Occupational History     Not on file.     Social History Main Topics     Smoking status: Current Some Day Smoker     Packs/day: 0.10     Years: 20.00     Smokeless tobacco: Never Used     Alcohol use No      Comment: h/o alcohol abuse      Drug use: No     Sexual activity: No      Comment: single     Other Topics Concern     Not on file     Social History Narrative         Review of systems is as stated in HPI, and the remainder of the 10 system review is otherwise negative.    Objective:     Vitals:    03/13/18 1405   BP: 132/84   Patient Site: Right Arm   Patient Position: Sitting   Cuff Size: Adult Large   Pulse: 86   Resp: 20   SpO2: 96%   Weight: 219 lb 1.6 oz (99.4 kg)   Height: 5' 5\" (1.651 m)    Body mass index is 36.46 kg/(m^2).    Alert female.  Flattened affect.  Heart with regular rate and rhythm.  Lungs clear.  Extremities are without edema.  Good peripheral pulses.  She has thickening of the toenails consistent with onychomycosis.  No tinea pedis.  Normal sensation to monofilament testing.    Office Visit on 03/13/2018   Component Date Value Ref Range Status     Hemoglobin A1c 03/13/2018 8.4* 3.5 - 6.0 % Final          This note has been dictated using voice recognition software. Any grammatical or context distortions are unintentional and " inherent to the the software.

## 2021-06-16 NOTE — PROGRESS NOTES
Vitals - Patient Reported  Weight (Patient Reported): 194 lb (88 kg)     Unable to get vitals     Review of Systems - History obtained from the patient  Hematological and Lymphatic ROS: positive for - easy bruising   Cardiovascular ROS: positive for - arm pain      Maryam Saha, CMA

## 2021-06-17 ENCOUNTER — COMMUNICATION - HEALTHEAST (OUTPATIENT)
Dept: CARDIOLOGY | Facility: CLINIC | Age: 61
End: 2021-06-17

## 2021-06-17 NOTE — TELEPHONE ENCOUNTER
Refill Approved    Rx renewed per Medication Renewal Policy. Medication was last renewed on 6/26/20.    Eron Eric, [facility] Triage/Med Refill 5/21/2021     Requested Prescriptions   Pending Prescriptions Disp Refills     metoprolol succinate (TOPROL-XL) 25 MG 45 tablet 2     Sig: Take 0.5 tablets (12.5 mg total) by mouth at bedtime.       Beta-Blockers Refill Protocol Passed - 5/20/2021  2:45 PM        Passed - PCP or prescribing provider visit in past 12 months or next 3 months     Last office visit with prescriber/PCP: 6/26/2019 Lucretia Raya MD OR same dept: Visit date not found OR same specialty: 4/23/2020 Sophia Thomas MD  Last physical: 10/12/2020 Last MTM visit: Visit date not found   Next visit within 3 mo: Visit date not found  Next physical within 3 mo: Visit date not found  Prescriber OR PCP: Lucretia Raya MD  Last diagnosis associated with med order: 1. Non-STEMI (non-ST elevated myocardial infarction) (H)  - metoprolol succinate (TOPROL-XL) 25 MG; Take 0.5 tablets (12.5 mg total) by mouth at bedtime.  Dispense: 45 tablet; Refill: 2    2. Ischemic cardiomyopathy  - metoprolol succinate (TOPROL-XL) 25 MG; Take 0.5 tablets (12.5 mg total) by mouth at bedtime.  Dispense: 45 tablet; Refill: 2    If protocol passes may refill for 12 months if within 3 months of last provider visit (or a total of 15 months).             Passed - Blood pressure filed in past 12 months     BP Readings from Last 1 Encounters:   10/12/20 100/58

## 2021-06-17 NOTE — TELEPHONE ENCOUNTER
RN cannot approve Refill Request    RN can NOT refill this medication PCP messaged that patient is overdue for Labs and Office Visit. Last office visit: 6/26/2019 Lucretia Raya MD Last Physical: 10/12/2020 Last MTM visit: Visit date not found Last visit same specialty: 4/23/2020 Sophia Thomas MD.  Next visit within 3 mo: Visit date not found  Next physical within 3 mo: Visit date not found      Alma Rosa Bhat, [facility] Triage/Med Refill 5/20/2021    Requested Prescriptions   Pending Prescriptions Disp Refills     blood glucose test (CONTOUR NEXT TEST STRIPS) strips 100 strip 3     Sig: Use 1 each As Directed daily. Dispense brand per patient's insurance at pharmacy discretion.       Diabetic Supplies Refill Protocol Failed - 5/20/2021  2:47 PM        Failed - Visit with PCP or prescribing provider visit in last 6 months     Last office visit with prescriber/PCP: 6/26/2019 Lucretia Raya MD OR same dept: Visit date not found OR same specialty: 4/23/2020 Sophia Thomas MD  Last physical: 10/12/2020 Last MTM visit: Visit date not found   Next visit within 3 mo: Visit date not found  Next physical within 3 mo: Visit date not found  Prescriber OR PCP: Lucretia Raya MD  Last diagnosis associated with med order: 1. Type 2 diabetes mellitus with complication, without long-term current use of insulin (H)  - blood glucose test (CONTOUR NEXT TEST STRIPS) strips; Use 1 each As Directed daily. Dispense brand per patient's insurance at pharmacy discretion.  Dispense: 100 strip; Refill: 3    If protocol passes may refill for 12 months if within 3 months of last provider visit (or a total of 15 months).             Failed - A1C in last 6 months     Hemoglobin A1c   Date Value Ref Range Status   10/12/2020 7.3 (H) <=5.6 % Final     Comment:     Normal <5.7% Prediabete 5.7-6.4% Diabletes 6.5% or higher - adopted from ADA consensus guidelines

## 2021-06-17 NOTE — TELEPHONE ENCOUNTER
Telephone Encounter by Christiana Hernandez at 5/19/2020 12:31 PM     Author: Christiana Hernandez Service: -- Author Type: --    Filed: 5/19/2020 12:49 PM Encounter Date: 5/18/2020 Status: Addendum    : Christiana Hernandez    Related Notes: Original Note by Christiana Hernandez filed at 5/19/2020 12:32 PM       PRIOR AUTHORIZATION DENIED    Denial Rational: Insurance prefers Cont[employer] products.

## 2021-06-18 NOTE — PROGRESS NOTES
Assessment: Catina is here alone for her initial education for Diabetes.  Her A1C was 12.2%.    She is taking Metformin 500mg twice daily.  Stated she did have some diarrhea when starting Metformin, this has resolved.      She is not interested in checking her blood sugar at home, she has a fear of needles.    She has pain in her legs and back so she does not get much activity.  She is not interested in talking about activity while here today.    She has already made some changes to her meal plan.  Stated she started eating 3 meals a day and stopped eating sugary foods.  Stated she has been more nauseated since the adjustment.  Discussed carb counting and carb ID.  Reviewed balanced meals and how often to eat during the day.    Plan: Catina would like to work on meal planning before adding medications.  Asked her to follow-up in 4 weeks.    Subjective and Objective:      Catina Acuna is referred by Deya Jon CNP for Diabetes Education.     Lab Results   Component Value Date    HGBA1C 8.4 (H) 03/13/2018         Current diabetes medications:  Metformin 500mg BID    Goals     None          Follow up:   CDE (certified diabetic educator)      Education:     Monitoring   Meter (per above goals): Assessed and Discussed  Monitoring: Not addressed  BG goals: Not addressed    Nutrition Management  Nutrition Management: Assessed and Discussed  Weight: Assessed and Discussed  Portions/Balance: Assessed, Discussed and Literature provided  Carb ID/Count: Assessed, Discussed and Literature provided  Label Reading: Assessed, Discussed and Literature provided  Heart Healthy Fats: Assessed, Discussed and Literature provided  Menu Planning: Assessed, Discussed and Literature provided  Dining Out: Assessed, Discussed and Literature provided  Physical Activity: Assessed and Discussed  Medications: Assessed and Discussed  Orals: Assessed and Discussed  Injected Medications: Not addressed   Storage/Exp:Not addressed   Site Rotation: Not  addressed   Sites Assessed: no    Diabetes Disease Process: Assessed and Discussed    Acute Complications: Prevent, Detect, Treat:  Hypoglycemia: Discussed and Literature provided  Hyperglycemia: Discussed and Literature provided  Sick Days: Needs instruction/review at follow-up and Not addressed  Driving: Needs instruction/review at follow-up and Not addressed    Chronic Complications  Foot Care:Needs instruction/review at follow-up and Not addressed  Skin Care: Needs instruction/review at follow-up and Not addressed  Eye: Needs instruction/review at follow-up and Not addressed  ABC: Needs instruction/review at follow-up and Not addressed  Teeth:Needs instruction/review at follow-up and Not addressed  Goal Setting and Problem Solving: Assessed and Discussed  Barriers: Assessed and Discussed  Psychosocial Adjustments: Assessed and Discussed      Time spent with the patient: 60 minutes for diabetes education and counseling.   Previous Education: no  Visit Type:DSMT  Hours Remaining: DSMT 9 and MNT 3      Meme Bolanos  6/4/2018

## 2021-06-19 NOTE — LETTER
Letter by Lucretia Raya MD at      Author: Lucretia Raya MD Service: -- Author Type: --    Filed:  Encounter Date: 9/26/2019 Status: Signed       Catina Acuna  [address]  [facility] [address]    September 26, 2019    Dear Caitna    In reviewing your records, we have determined a gap in your preventive services. Based on your age and health history, we recommend the follow service.     ? General Physical  ?   ? Colon cancer screening  ? Mammogram  ? Immunization  ? Diabetic check  ? Lab work  ? Med check    If you have had the service elsewhere, please contact us so we can update our records. Please let us know if you have transferred your care to another clinic.    Please call [phone] to schedule this appointment.    We believe that a strong preventive care program, including regular physicals and follow-up care is an important part of a healthy lifestyle and we are committed to helping you maintain your health.    Thank you for choosing us as your health care provider.    Sincerely,   [facility]  [address]  [facility] [address]  Phone Number:  [phone]

## 2021-06-20 NOTE — PROGRESS NOTES
Assessment and Plan:     1. Diabetes mellitus, type 2 (H)  Her A1C is 8%.  She has been noncompliant with medications.  I am reluctant to adjust medications due to this. She is also not checking her blood sugars so I am concerned of increased risk of hypoglycemia with Glipizide.   Will continue with Metformin as prescribed. Will refer to diabetes education for Freestyle [place].   - Glycosylated Hemoglobin A1c    2. Mixed hyperlipidemia  She continues atorvastatin 10 mg daily. She is not fasting today.  I encouraged her to fast for her next cholesterol check.     3. Morbid obesity (H)  The following high BMI interventions were performed this visit: dietary needs education, exercise promotion: strength training and exercise promotion: stretching    4. Visit for screening mammogram  - Mammo Screening Bilateral; Future    5. Colon cancer screening  - Ambulatory referral for Colonoscopy    The patient is content with the plan and will follow-up in 3 months for medication management with Dr. Raya or sooner with any further concerns.         Subjective:     Catina is a 57 y.o. female presenting to the clinic for a diabetes check.  Patient is taking metformin 1000 mg twice daily.  Last hemoglobin A1c was 8.4% on 3/13/18.  Patient states she has memory issues so occasionally forgets to take the medications.  I reminded her of using a pillbox and she says she forgets to put the medications in the pillbox.  She does not check her blood sugars.  She is not a fan of needles.  She is interested in the freestyle haleigh.  She does not consume a healthy diet or exercise.  She denies any sores on her feet.  Her last eye exam was in March.  She continues lisinopril for renal protection.  She is taking atorvastatin 10 mg daily for hyperlipidemia.  Last cholesterol check was on 12/7/17 with a total cholesterol 306, triglycerides 416, HDL 40, .  She is not fasting today.  Patient is concerned of intermittent right ear pain and  "pruritus for 2 months.  She denies any recent cold symptoms or allergies including rhinorrhea, postnasal drainage, cough, headache, stomachache, nausea, vomiting, fever.  She has not tried any over-the-counter products for her symptoms.    Review of Systems: A complete 14 point review of systems was obtained and is negative or as stated in the history of present illness.    Social History     Social History     Marital status: Single     Spouse name: N/A     Number of children: N/A     Years of education: N/A     Occupational History     Not on file.     Social History Main Topics     Smoking status: Current Some Day Smoker     Packs/day: 0.10     Years: 20.00     Smokeless tobacco: Never Used     Alcohol use No      Comment: h/o alcohol abuse      Drug use: No     Sexual activity: No      Comment: single     Other Topics Concern     Not on file     Social History Narrative       Active Ambulatory Problems     Diagnosis Date Noted     Delusional disorder (H)      Mixed hyperlipidemia 12/17/2017     Chronic pain syndrome 03/13/2018     Resolved Ambulatory Problems     Diagnosis Date Noted     Depression      Peripheral Neuropathy      Fatigue      Abdominal Pain In The [place] (Epigastric)      Past Medical History:   Diagnosis Date     Depression        Family History   Problem Relation Age of Onset     Hypertension Mother      Heart failure Mother      Dementia Mother      Heart disease Mother      Hypertension Father      Heart disease Father      Diabetes Father      Emphysema Father        Objective:     /88  Pulse (!) 102  Ht 5' 5\" (1.651 m)  Wt 219 lb 4.8 oz (99.5 kg)  BMI 36.49 kg/m2    Patient is alert, in no obvious distress.   Skin: Warm, dry.  No lesions or rashes.  Skin turgor rapid return.   HEENT:  Head normocephalic, atraumatic.  Eyes normal. Right ear has moderate cerumen present.  I removed a small amount of cerumen with a curet.  Ear lavage was performed.  After ear lavage, " ear appears normal.  Left ear is normal.  Nose patent, mucosa pink.  Oropharynx mucosa pink.  No lesions or tonsillar enlargement.   Neck: Supple, no lymphadenopathy.  Lungs:  Clear to auscultation. Respirations even and unlabored.  No wheezing or rales noted.   Heart:  Regular rate and rhythm.  No murmurs.  Musculoskeletal: monofilament testing is negative bilaterally.

## 2021-06-21 NOTE — PROGRESS NOTES
Assessment: Catina is here alone today for help with her glucose meter.    She is taking Metformin XR 2000mg with dinner.  Stated this seems to be going well for her.  She is remembering to take this most days, if she forgets, she will take in the morning.  Stated she continues to have trouble with diarrhea when she eats foods high in sugar.    Stated she has been having a hard time getting enough blood to do her glucose checks at home.  Discussed washing hands with warm to hot water before checking, holding hand down below heart and rubbing fingers together.      All additional questions and concerns addressed.    Plan: Catina will follow-up with Dr. Raya in December for blood work and repeat A1C.    Subjective and Objective:      Catina Acuna is referred by Lupe Raya for Diabetes Education.     Lab Results   Component Value Date    HGBA1C 8.0 (H) 09/06/2018       Goals     None          Follow up:   Primary care visit      Education:     Monitoring   Meter (per above goals): Assessed and Discussed  Monitoring: Assessed and Discussed  BG goals: Assessed, Discussed and Literature provided    Nutrition Management  Nutrition Management: Assessed and Discussed  Weight: Assessed  Portions/Balance: Not addressed  Carb ID/Count: Not addressed  Label Reading: Not addressed  Heart Healthy Fats: Not addressed  Menu Planning: Not addressed  Dining Out: Not addressed  Physical Activity: Not addressed  Medications: Assessed and Discussed  Orals: Assessed and Discussed  Injected Medications: Not addressed   Storage/Exp:Not addressed   Site Rotation: Not addressed   Sites Assessed: no    Diabetes Disease Process: Assessed and Discussed    Acute Complications: Prevent, Detect, Treat:  Hypoglycemia: Not addressed  Hyperglycemia: Assessed and Discussed  Sick Days: Not addressed  Driving: Not addressed    Chronic Complications  Foot Care:Not addressed  Skin Care: Not addressed  Eye: Not addressed  ABC: Not  addressed  Teeth:Not addressed  Goal Setting and Problem Solving: Assessed and Discussed  Barriers: Assessed and Discussed  Psychosocial Adjustments: Assessed and Discussed      Time spent with the patient: 30 minutes for diabetes education and counseling.   Previous Education: yes  Visit Type:DSMT  Hours Remaining: DSMT 8 and MNT 3      Meme Bolanos  10/30/2018

## 2021-06-22 NOTE — PROGRESS NOTES
Assessment/Plan:     1. Type 2 diabetes mellitus (H)  Significant improvement in A1c from baseline, not yet at goal of less than 7, likely due to inadequate medication compliance, and ability to exercise, and ongoing dietary indiscretion.  Encourage efforts at healthy eating, to increase vegetables, to work on reducing baked goods and sweets.  Follow-up with me in 6 months, sooner further problems or concerns.  Will check comprehensive metabolic panel and LDL today.  - Glycosylated Hemoglobin A1c  - metFORMIN (GLUCOPHAGE XR) 500 MG 24 hr tablet; Take 4 tablets (2,000 mg total) by mouth daily with supper.  Dispense: 360 tablet; Refill: 4    2. Chronic pain syndrome  Etiology is not apparent.  Patient with believe that this is due to tanning bed that has been labeled as delusional, though I suspect this is more in the fibromyalgia family.  We discussed reasons for avoidance of chronic opioids compared to in the past.  We discussed Cymbalta as an option.  Gabapentin made her pain worse.  We briefly discussed medical marijuana as well.  She will let me know if she would like to try something.  She will forward paperwork from [facility] to me to assist in transportation in light of physical limitations related to her chronic pain.  She is not able to ambulate more than a block at a time.  Requires frequent resting, and has severe pain flares when more active.  While she has reports of memory loss and poor focus, this does not limit her ability to participate safely in transportation.    3. Obesity (BMI 35.0-39.9) with comorbidity (H)  I encouraged healthy lifestyle habits including activity as tolerated though significantly limited and healthy eating.      There are no Patient Instructions on file for this visit.     No Follow-up on file.    I have counseled the patient for tobacco cessation and the follow up will occur  at the next visit.    Subjective:      Catina Acuna is a 58 y.o. female presented to clinic  today for follow-up of type 2 diabetes.  She is trying to eat better though she continues to struggle with baked goods and sweets, especially as her son tends to bring sweets into their home.  Feels that she is doing well with protein and is working harder to get more veggies though she still struggles with this.  Intermittent sugar checks at best.  About once a week she will forget to take her metformin.  Noting some diarrhea but feels that it preceded the metformin and would prefer to tolerate the diarrhea than change the medication.  Denies any low sugars.  Continued struggles with significant muscle pain throughout her body but greatest in her back and feet along with chronic headaches.  Describes neuropathy and nerve pain though she has no loss of sensation.  Unable to block walk a full city block, requires frequent resting, notes that activity leads to significant worsening of her pain.  Ibuprofen helps some with the most severe burning and discomfort.    Current Outpatient Medications   Medication Sig Dispense Refill     aspirin 81 MG EC tablet Take 1 tablet (81 mg total) by mouth daily. 90 tablet 4     atorvastatin (LIPITOR) 10 MG tablet TAKE ONE TABLET BY MOUTH AT BEDTIME 90 tablet 4     blood glucose meter (GLUCOMETER) Use 1 each As Directed daily. Dispense glucometer brand per patient's insurance at pharmacy discretion. 1 each 0     blood glucose test strips Use 1 each As Directed daily. Dispense brand per patient's insurance at pharmacy discretion. 100 strip 5     generic lancets (FINGERSTIX LANCETS) Use 1 each As Directed daily. Dispense brand per patient's insurance at pharmacy discretion. 100 each 5     ibuprofen (ADVIL,MOTRIN) 200 MG tablet Take 200 mg by mouth every 6 (six) hours as needed for pain.       lisinopril (PRINIVIL,ZESTRIL) 5 MG tablet TAKE ONE TABLET BY MOUTH EVERY DAY 90 tablet 4     metFORMIN (GLUCOPHAGE XR) 500 MG 24 hr tablet Take 4 tablets (2,000 mg total) by mouth daily with  "supper. 360 tablet 0     No current facility-administered medications for this visit.        Past Medical History, Family History, and Social History reviewed.  Past Medical History:   Diagnosis Date     Depression      Past Surgical History:   Procedure Laterality Date     ANKLE FRACTURE SURGERY       Patient has no known allergies.  Family History   Problem Relation Age of Onset     Hypertension Mother      Heart failure Mother      Dementia Mother      Heart disease Mother      Hypertension Father      Heart disease Father      Diabetes Father      Emphysema Father      Social History     Socioeconomic History     Marital status: Single     Spouse name: Not on file     Number of children: Not on file     Years of education: Not on file     Highest education level: Not on file   Social Needs     Financial resource strain: Not on file     Food insecurity - worry: Not on file     Food insecurity - inability: Not on file     Transportation needs - medical: Not on file     Transportation needs - non-medical: Not on file   Occupational History     Not on file   Tobacco Use     Smoking status: Current Some Day Smoker     Packs/day: 0.10     Years: 20.00     Pack years: 2.00     Smokeless tobacco: Never Used   Substance and Sexual Activity     Alcohol use: No     Comment: h/o alcohol abuse      Drug use: No     Sexual activity: No     Comment: single   Other Topics Concern     Not on file   Social History Narrative     Not on file         Review of systems is as stated in HPI, and the remainder of the 10 system review is otherwise negative.    Objective:     Vitals:    12/12/18 1605   BP: 134/86   Patient Site: Right Arm   Patient Position: Sitting   Cuff Size: Adult Large   Pulse: 100   Resp: 20   SpO2: 98%   Weight: 221 lb 1.6 oz (100.3 kg)   Height: 5' 5\" (1.651 m)    Body mass index is 36.79 kg/m .    Alert female, wearing sunglasses.  Mucous memories moist.  Heart with regular rate and rhythm.  Lungs clear.  Normal " foot exam with good peripheral pulses.      This note has been dictated using voice recognition software. Any grammatical or context distortions are unintentional and inherent to the the software.

## 2021-06-26 NOTE — TELEPHONE ENCOUNTER
Pt asked what the hypokinesis is on her echo, and states she hasn't heard that before.    Explained hypokinesis, and informed pt it has shown on her echo since her NSTEMI in 2019.      Pt denies any heart related symptoms except she does breathe heavy after going up 7 steps at home, but no other times.  Pt is not having any bleeding issues.    Pt due for follow-up this month, message sent to scheduling for appt.  maggy

## 2021-06-28 NOTE — PROGRESS NOTES
Progress Notes by Madina Garcia CNP at 11/14/2019  2:10 PM     Author: Madina Garcia CNP Service: -- Author Type: Nurse Practitioner    Filed: 11/14/2019  2:53 PM Encounter Date: 11/14/2019 Status: Signed    : Madina Garcia CNP (Nurse Practitioner)             Assessment/Recommendations   1.  Coronary artery disease:  She was hospitalized October 30 to November 1 with non-STEMI.  She received drug-eluting stent to left circumflex and has a  of RCA.  Dual antiplatelet therapy is being used with aspirin indefinitely and ticagrelor for 1 year. We discussed the importance of antiplatelet therapy and talking with her cardiologist prior to stopping these medications for any reason.  Puncture site is soft with no hematoma.      Risk factor modification and lifestyle management topics were discussed including managing comorbidities, weight loss, heart healthy diet, exercise and smoking cessation.  She was encouraged to call cardiac rehab to schedule    2.  Dyslipidemia: Catina Acuna is on high intensity statin therapy with atorvastatin 80 mg daily.  Her lipid was increased during hospitalization.  Her LDL is 214.  We discussed that she may need further medications to reduce LDL.  I recommended rechecking LDL in 1 to 2 months.  We discussed a diet low in saturated fat, weight loss, and exercise along with medication for better control of cholesterol.     3.  Ischemic cardiomyopathy, ejection fraction 45 to 50%: She has no symptoms of acute fluid retention.    4.  Diabetes:  We discussed the importance of tightly controlled blood sugars to minimize risk of worsening coronary artery disease.  Primary care provider to manage diabetes.    5.  Tobacco use: She is no longer smoking cigarettes and is using nicotine patches.    Catina will follow up with Dr. Mabry in 4 to 6 weeks.       History of Present Illness/Subjective    Catina Acuna is seen at [facility] for post  coronary intervention follow up.  She was hospitalized October 30 to November 1 with non-STEMI.  She received drug-eluting stent to left circumflex and has a  of RCA.  Dual antiplatelet therapy is being used with aspirin indefinitely and ticagrelor for 1 year.  Echocardiogram on October 30, 2019 showed ejection fraction 45 to 50% and mild to moderate mitral regurgitation.  She has a history of dyslipidemia and diabetes.    She denies any chest pain or shortness of breath since PCI.  She has no symptoms of acute fluid retention.  She denies fatigue, lightheadedness and lower extremity edema.      Results for orders placed during the hospital encounter of 10/30/19   Cardiac Catheterization [CATH01] 10/31/2019    Narrative   Ost RCA to Mid RCA lesion is 35% stenosed.    Mid RCA lesion is 100% stenosed.    Mid Cx to Dist Cx lesion is 80% stenosed.    Pressure wire/FFR was performed on the LCX lesion. pre diagnositic: 0.4.   post diagnostic: 0.4.    A drug eluting stent was successfully placed into LCX.           Physical Examination Review of Systems   Vitals:    11/14/19 1409   BP: 100/66   Pulse: 64   Resp: 18     Body mass index is 34.95 kg/m .  Wt Readings from Last 3 Encounters:   11/14/19 210 lb (95.3 kg)   11/04/19 209 lb 4.8 oz (94.9 kg)   11/01/19 213 lb 8 oz (96.8 kg)       General Appearance:     Alert, cooperative and in no acute distress.   ENT/Mouth: membranes moist, no oral lesions or bleeding gums.      EYES:  no scleral icterus, normal conjunctivae   Chest/Lungs:   lungs are clear to auscultation, no rales or wheezing, respirations unlabored   Cardiovascular:   Regular. Normal first and second heart sounds, no edema bilateral lower extremities    Abdomen:  Soft, nontender, nondistended, bowel sounds present   Extremities: no cyanosis or clubbing   Skin:  Neurologic: warm, dry.  mood and affect are appropriate, alert and oriented x3     Puncture Site: Right radial site is soft with no bruising.   Radial pulses and Pedal pulses intact and symmetrical.  CMS intact.        General: Weight Loss  Eyes: WNL  Ears/Nose/Throat: Nosebleeds  Lungs: WNL  Heart: WNL  Stomach: Constipation  Bladder: WNL  Muscle/Joints: WNL  Skin: WNL  Nervous System: WNL  Mental Health: WNL     Blood: WNL     Medical History  Surgical History Family History Social History   Past Medical History:   Diagnosis Date   ? Depression     Past Surgical History:   Procedure Laterality Date   ? ANKLE FRACTURE SURGERY     ? CV CORONARY ANGIOGRAM N/A 10/31/2019    Procedure: Coronary Angiogram;  Surgeon: Chip Escalera MD;  Location: [facility];  Service: Cardiology    Family History   Problem Relation Age of Onset   ? Hypertension Mother    ? Heart failure Mother    ? Dementia Mother    ? Heart disease Mother    ? Hypertension Father    ? Heart disease Father    ? Diabetes Father    ? Emphysema Father     Social History     Socioeconomic History   ? Marital status: Single     Spouse name: Not on file   ? Number of children: Not on file   ? Years of education: Not on file   ? Highest education level: Not on file   Occupational History   ? Not on file   Social Needs   ? Financial resource strain: Not on file   ? Food insecurity:     Worry: Not on file     Inability: Not on file   ? Transportation needs:     Medical: Not on file     Non-medical: Not on file   Tobacco Use   ? Smoking status: Current Some Day Smoker     Packs/day: 0.10     Years: 20.00     Pack years: 2.00     Types: Cigarettes   ? Smokeless tobacco: Never Used   Substance and Sexual Activity   ? Alcohol use: No     Comment: h/o alcohol abuse    ? Drug use: No   ? Sexual activity: Never     Comment: single   Lifestyle   ? Physical activity:     Days per week: Not on file     Minutes per session: Not on file   ? Stress: Not on file   Relationships   ? Social connections:     Talks on phone: Not on file     Gets together: Not on file     Attends [employer] service: Not on  file     Active member of club or organization: Not on file     Attends meetings of clubs or organizations: Not on file     Relationship status: Not on file   ? Intimate partner violence:     Fear of current or ex partner: Not on file     Emotionally abused: Not on file     Physically abused: Not on file     Forced sexual activity: Not on file   Other Topics Concern   ? Not on file   Social History Narrative   ? Not on file          Medications  Allergies   Current Outpatient Medications   Medication Sig Dispense Refill   ? aspirin 81 mg chewable tablet Chew 1 tablet (81 mg total) daily. 30 tablet 0   ? atorvastatin (LIPITOR) 80 MG tablet Take 1 tablet (80 mg total) by mouth at bedtime. 30 tablet 11   ? blood glucose meter (GLUCOMETER) Use 1 each As Directed daily. Dispense glucometer brand per patient's insurance at pharmacy discretion. 1 each 0   ? blood glucose test strips Use 1 each As Directed daily. Dispense brand per patient's insurance at pharmacy discretion. 100 strip 5   ? DM/p-ephed/acetaminoph/doxylam (ROSARIO-SELTZER PLUS COLD+FLU ORAL) Take 1 tablet by mouth 4 (four) times a day as needed.     ? DULoxetine (CYMBALTA) 20 MG capsule Take 1 capsule (20 mg total) by mouth 2 (two) times a day. 180 capsule 3   ? furosemide (LASIX) 40 MG tablet Take 1 tablet (40 mg total) by mouth daily. 30 tablet 2   ? generic lancets (FINGERSTIX LANCETS) Use 1 each As Directed daily. Dispense brand per patient's insurance at pharmacy discretion. 100 each 5   ? lisinopril (PRINIVIL,ZESTRIL) 5 MG tablet TAKE ONE TABLET BY MOUTH EVERY DAY 90 tablet 2   ? metFORMIN (GLUCOPHAGE XR) 500 MG 24 hr tablet Take 4 tablets (2,000 mg total) by mouth daily with supper. 360 tablet 4   ? metoprolol succinate (TOPROL-XL) 25 MG Take 1 tablet (25 mg total) by mouth daily. 30 tablet 2   ? nicotine (NICODERM CQ) 14 mg/24 hr Place 1 patch on the skin daily as needed for smoking cessation. 30 patch 0   ? ticagrelor (BRILINTA) 90 mg Tab Take 1 tablet  (90 mg total) by mouth 2 (two) times a day. For 12 months 60 tablet 11   ? nitroglycerin (NITROSTAT) 0.4 MG SL tablet Place 1 tablet (0.4 mg total) under the tongue every 5 (five) minutes as needed for chest pain. 25 tablet 1     No current facility-administered medications for this visit.     No Known Allergies      Lab Results    Chemistry/lipid CBC Cardiac Enzymes/BNP/TSH/INR   Lab Results   Component Value Date    CHOL 306 (H) 12/07/2017    HDL 40 (L) 12/07/2017    LDLCALC  12/07/2017      Comment:      Invalid, Triglycerides >400    TRIG 416 (H) 12/07/2017    CREATININE 0.83 11/01/2019    BUN 26 (H) 11/01/2019    K 3.5 11/01/2019     11/01/2019     11/01/2019    CO2 26 11/01/2019    Lab Results   Component Value Date    WBC 8.9 10/30/2019    HGB 11.1 (L) 10/30/2019    HCT 33.3 (L) 10/30/2019    MCV 90 10/30/2019     10/30/2019    Lab Results   Component Value Date    TROPONINI 0.59 (HH) 11/01/2019     (H) 10/30/2019    TSH 1.3 05/10/2012

## 2021-06-28 NOTE — PROGRESS NOTES
Progress Notes by Roberto Mabry MD at 1/6/2020  3:10 PM     Author: Roberto Mabry MD Service: -- Author Type: Physician    Filed: 1/7/2020 12:52 PM Encounter Date: 1/6/2020 Status: Signed    : Roberto Mabry MD (Physician)             [facility] Progress Note    Assessment:  1.  Coronary artery disease.  Patient admitted a number of months ago with increasing symptoms of shortness of breath and found to have two-vessel coronary artery disease.  She underwent successful stenting to the circumflex with residual persistent 100% stenosis within the right coronary artery.  She is feeling much improved.  She has not described any symptoms suggestive of recurrent angina.  We spent some time discussing monitoring for specific symptoms and reviewing the importance of taking her medications.  She has had some occasional nosebleeds and we talked about this potentially being related to the aspirin and Brilinta but the importance of taking this combination of medications.  If she has additional nosebleeds she will be in touch with her primary care provider.    2.  Risk factor modification.  I am pleased to hear that she is discontinued tobacco.  I congratulated her on these efforts.  We are going to plan follow-up lipid studies on the higher doses of atorvastatin which she reports that she is tolerating.  I would asked that she follow-up with her primary care provider and continue with aggressive treatment for her diabetes.      Plan: As outlined above with follow-up in 3 to 4 months.            Cardiac echocardiogram and laboratory studies.             I encouraged her to sign up for cardiac rehabilitation.    1. CAD (coronary artery disease)  Echo Complete    Comprehensive metabolic panel    Lipid Profile    ECG Clinic - Today   2. Non-STEMI (non-ST elevated myocardial infarction) (H)     3. Ischemic cardiomyopathy     4. Tobacco use           An After Visit Summary was printed and given  to the patient.    Subjective:    Catina Acuna is a 59 y.o. female who returned for a planned  follow up visit.  We met in hospital consultation October 30, 2019 when she presented with increasing shortness of breath, right-sided chest discomfort and mild elevation in d-dimer and troponin.  Findings did suggest volume overload.  CT did not suggest endings of pulmonary embolism and she was transferred to [facility] and underwent coronary angiography.  This demonstrated an 80% circumflex stenosis and 100% mid RCA stenosis.  She had successful stenting to the circumflex with residual stenosis of the right coronary artery.    She tells me she is been feeling well.  She had nonspecific symptoms for a number of months prior to coming to the hospital.  She states that she is no longer having chest pressure and indicates that shortness of breath symptoms are improved.  She is not experienced any significant dizziness or lightheadedness except for some mild lightheadedness upon standing.    Today we reviewed the reasons for her medications.  We reviewed the coronary angiogram and echocardiographic findings.  We talked about the reduced left ventricular function and the recommendation to consider participating in cardiac rehabilitation.    Review of Systems:   General: WNL  Eyes: WNL  Ears/Nose/Throat: Nosebleeds  Lungs: Wheezing  Heart: WNL  Stomach: WNL  Bladder: WNL  Muscle/Joints: WNL  Skin: WNL  Nervous System: WNL  Mental Health: WNL     Blood: WNL      Problem List:    Patient Active Problem List   Diagnosis   ? Delusional disorder (H)   ? Mixed hyperlipidemia   ? Chronic pain syndrome   ? Obesity (BMI 35.0-39.9) with comorbidity (H)   ? Type 2 diabetes mellitus with complication, without long-term current use of insulin (H)   ? Non-STEMI (non-ST elevated myocardial infarction) (H)   ? NSTEMI (non-ST elevated myocardial infarction) (H)   ? Ischemic cardiomyopathy   ? Tobacco use       Social History      Socioeconomic History   ? Marital status: Single     Spouse name: Not on file   ? Number of children: Not on file   ? Years of education: Not on file   ? Highest education level: Not on file   Occupational History   ? Not on file   Social Needs   ? Financial resource strain: Not on file   ? Food insecurity:     Worry: Not on file     Inability: Not on file   ? Transportation needs:     Medical: Not on file     Non-medical: Not on file   Tobacco Use   ? Smoking status: Current Some Day Smoker     Packs/day: 0.10     Years: 20.00     Pack years: 2.00     Types: Cigarettes   ? Smokeless tobacco: Never Used   Substance and Sexual Activity   ? Alcohol use: No     Comment: h/o alcohol abuse    ? Drug use: No   ? Sexual activity: Never     Comment: single   Lifestyle   ? Physical activity:     Days per week: Not on file     Minutes per session: Not on file   ? Stress: Not on file   Relationships   ? Social connections:     Talks on phone: Not on file     Gets together: Not on file     Attends [employer] service: Not on file     Active member of club or organization: Not on file     Attends meetings of clubs or organizations: Not on file     Relationship status: Not on file   ? Intimate partner violence:     Fear of current or ex partner: Not on file     Emotionally abused: Not on file     Physically abused: Not on file     Forced sexual activity: Not on file   Other Topics Concern   ? Not on file   Social History Narrative   ? Not on file       Family History   Problem Relation Age of Onset   ? Hypertension Mother    ? Heart failure Mother    ? Dementia Mother    ? Heart disease Mother    ? Hypertension Father    ? Heart disease Father    ? Diabetes Father    ? Emphysema Father        Current Outpatient Medications   Medication Sig Dispense Refill   ? aspirin 81 mg chewable tablet Chew 1 tablet (81 mg total) daily. 30 tablet 0   ? atorvastatin (LIPITOR) 80 MG tablet Take 1 tablet (80 mg total) by mouth at bedtime. 30  "tablet 11   ? blood glucose meter (GLUCOMETER) Use 1 each As Directed daily. Dispense glucometer brand per patient's insurance at pharmacy discretion. 1 each 0   ? DM/p-ephed/acetaminoph/doxylam (ROSARIO-SELTZER PLUS COLD+FLU ORAL) Take 1 tablet by mouth 4 (four) times a day as needed.     ? furosemide (LASIX) 40 MG tablet Take 1 tablet (40 mg total) by mouth daily. 30 tablet 2   ? lisinopril (PRINIVIL,ZESTRIL) 5 MG tablet TAKE ONE TABLET BY MOUTH EVERY DAY 90 tablet 2   ? metFORMIN (GLUCOPHAGE XR) 500 MG 24 hr tablet Take 4 tablets (2,000 mg total) by mouth daily with supper. 360 tablet 0   ? metoprolol succinate (TOPROL-XL) 25 MG Take 1 tablet (25 mg total) by mouth daily. 30 tablet 2   ? nitroglycerin (NITROSTAT) 0.4 MG SL tablet Place 1 tablet (0.4 mg total) under the tongue every 5 (five) minutes as needed for chest pain. 25 tablet 1   ? ONETOUCH DELICA LANCETS 33 gauge Misc USE TO TEST BLOOD SUGAR ONCE DAILY 100 each 3   ? ONETOUCH VERIO strips USE TO TEST BLOOD SUGAR ONCE DAILY AS DIRECTED 100 strip 3   ? senna (SENOKOT) 8.6 mg tablet Take 1 tablet by mouth daily as needed for constipation.     ? ticagrelor (BRILINTA) 90 mg Tab Take 1 tablet (90 mg total) by mouth 2 (two) times a day. For 12 months 60 tablet 11   ? DULoxetine (CYMBALTA) 20 MG capsule Take 1 capsule (20 mg total) by mouth 2 (two) times a day. 180 capsule 3     No current facility-administered medications for this visit.        Objective:     /60 (Patient Site: Left Arm, Patient Position: Sitting, Cuff Size: Adult Regular)   Pulse 64   Resp 12   Ht 5' 5\" (1.651 m)   Wt 204 lb 8 oz (92.8 kg)   BMI 34.03 kg/m    204 lb 8 oz (92.8 kg)   [unfilled]  Wt Readings from Last 3 Encounters:   01/06/20 204 lb 8 oz (92.8 kg)   11/14/19 210 lb (95.3 kg)   11/04/19 209 lb 4.8 oz (94.9 kg)       Physical Exam:    GENERAL APPEARANCE: alert, no apparent distress  HEENT: no scleral icterus or xanthelasma  NECK: jugular venous pressure is not elevated " on today's examination.  CHEST: symmetric, the lungs are mildly diminished at the bases.  CARDIOVASCULAR: regular rhythm, distant heart tones, S4; no carotid bruits  Abdomen: No Organomegaly, masses, bruits, or tenderness. Bowels sounds are present      EXTREMITIES: no cyanosis, clubbing or edema    Cardiac Testing:     Reviewed By     Eriberto Croft MD on 10/31/2019 15:40   Cardiac Catheterization   Order# [id number]   Reading physician: Chip Escalera MD Ordering physician: Eriberto Croft MD Study date: 10/31/19   Patient Information     Patient Name  Catina Acuna MRN  [id number] Sex  Female  1  1960 (58 y.o.)   Physicians     Panel Physicians Referring Physician Case Authorizing Physician   Chip Escalera MD (Primary) Eriberto Croft MD Halbe, Dennis W, MD    PCP     Primary Care Provider   Lucretia Raya MD   Phone: [phone]      Procedures     Coronary Angiogram   Percutaneous Coronary Intervention   Panel Information     Panel 1     Provider Role   Chip Escalera MD Primary    Procedure Laterality Anesthesia   Coronary Angiogram N/A Conscious Sedation (RN)   Percutaneous Coronary Intervention N/A Conscious Sedation (RN)         Pre Procedure Diagnosis     SOB with elevated troponin   Indications     Non-STEMI (non-ST elevated myocardial infarction) (H) [I21.4 (ICD-10-CM)]    Procedure Status     Procedure scheduled as Urgent (Inpatient).         Conclusion       Ost RCA to Mid RCA lesion is 35% stenosed.    Mid RCA lesion is 100% stenosed.    Mid Cx to Dist Cx lesion is 80% stenosed.    Pressure wire/FFR was performed on the LCX lesion. pre diagnositic: 0.4. post diagnostic: 0.4.    A drug eluting stent was successfully placed into LCX.      Recommendations     ? Patient given specific instructions regarding care of arteriotomy site, activity restrictions, signs and symptoms of cardiac or vascular complications and to seek immediate medical evaluation should they occur.  ?  Arterial sheath removed from radial artery with TR Band placement.  ? Continuation of dual antiplatelet therapy for 12 month(s).  ? Risk factor management for atherosclerosis.  ? If symptoms persist and evidence viability inferior wall may consider for  PCI to RCA? Discontinue tobacco smoking.       Echo Complete   Order# [id number]   Reading physician: Berkley Beavers MD Ordering physician: Braxton Vicente MD Study date: 10/30/19   Performing Date Performing Department   Oct 30, 2019  CARDIAC TESTING [[id number]]   Patient Information     Patient Name  Catina Acuna MRN  [id number] Sex  Female  [birth year]  1960 (58 y.o.)   Indications     Myocardial infarction   Summary     1. The left ventricle is normal in size. Left ventricular systolic performance is mildly reduced. The ejection fraction is estimated to be 45-50%.    2. There is severe basal and mid inferior hypokinesis.  There is moderate septal hypokinesis.  3. There is mild to moderate mitral insufficiency.   4. Normal right ventricular size and systolic performance.   5. There is mild to moderate left atrial enlargement.        ECG demonstrates his rhythm, inferior infarct, no acute ST-T segment changes.  Lab Results:    Lab Results   Component Value Date     2019    K 4.3 2019     2019    CO2 24 2019    BUN 23 (H) 2019    CREATININE 0.96 2019    CALCIUM 10.1 2019     Lab Results   Component Value Date    CHOL 306 (H) 2017    TRIG 416 (H) 2017    HDL 40 (L) 2017    LDLDIRECT 214 (H) 2019     BNP (pg/mL)   Date Value   10/30/2019 327 (H)     Creatinine (mg/dL)   Date Value   2019 0.96   2019 0.83   10/31/2019 0.80   10/30/2019 0.72     LDL Calculated (mg/dL)   Date Value   2017      Comment:     Invalid, Triglycerides >400     Lab Results   Component Value Date    WBC 8.9 10/30/2019    HGB 11.1 (L) 10/30/2019    HCT 33.3 (L) 10/30/2019    MCV  90 10/30/2019     10/30/2019         This note has been dictated using voice recognition software. Any grammatical or context distortions are unintentional and inherent to the software.

## 2021-06-29 NOTE — PROGRESS NOTES
"Progress Notes by Roberto Mabry MD at 8/3/2020  3:10 PM     Author: Roberto Mabry MD Service: -- Author Type: Physician    Filed: 8/3/2020  3:48 PM Encounter Date: 8/3/2020 Status: Signed    : Roberto Mabry MD (Physician)           The patient has been notified of following:     \"This telephone visit will be conducted via a call between you and your physician/provider. We have found that certain health care needs can be provided without the need for a physical exam.  This service lets us provide the care you need with a phone conversation.  If a prescription is necessary we can send it directly to your pharmacy.  If lab work is needed we can place an order for that and you can then stop by our lab to have the test done at a later time. If during the course of the call the physician/provider feels a telephone visit is not appropriate, you will not be charged for this service.\" Verbal consent has been obtained for this service by care team member:         HEART CARE PHONE ENCOUNTER        The patient has chosen to have the visit conducted as a telephone visit, to reduce risk of exposure given the current status of Coronavirus in our community. This telephone visit is being conducted via a call between the patient and physician/provider. Health care needs are being provided without a physical exam.     Assessment/Recommendations   Assessment:    1.  Coronary artery disease.  Patient admitted with increasing symptoms of shortness of breath and fluid overload and underwent coronary angiography did resulted in intervention to the circumflex with residual stenosis in the mid right coronary artery.  She continues to feel well and denies any complaints of chest discomfort or progression of shortness of breath.  She tells me she has lost approximately 25 pounds and is feeling much better than during that hospital admission.  Recent evaluation included a BMP that was within normal limits.  Echocardiogram is " demonstrated mild reduction in left ventricular function with an EF of 49% January 2020.  2.  Risk factor modification.  I am pleased to hear that she remains tobacco free.  She did request a blood pressure cuff but thus far she has had difficulty collecting blood pressure readings and is trying to get a new her cuff to reevaluate.  Most recent laboratory studies include from January 2020 an LDL of 75, triglycerides 117 and normal liver function test with a total cholesterol 131.  Plan:  1.  Follow-up 4 months with no change in cardiovascular combination of medications.      Follow Up Plan: Follow up in 4 months  I have reviewed the note as documented.  This accurately captures the substance of my conversation with the patient.    Total time of call between patient and provider was 15 minutes   Start Time:324   Stop Time:336       History of Present Illness/Subjective    Catina Acuna is a 59 y.o. female who is being evaluated via a billable telephone visit.    She reports she continues to feel much better than when she was in the hospital with acute coronary syndrome in October 2019.  At that time she had a circumflex lesion that was 80% stenosed in the 100% mid RCA lesion with a successful intervention to the circumflex.  She reports no complaints of significant chest discomfort other than a fleeting discomfort.  She does have some chronic breathlessness but she states that this continues to be much improved.  She is not describing orthopnea or PND.  She has tried to get a functional blood pressure cuff but is still having difficulty with an adequate blood pressure cuff and will continue to try to monitor her blood pressure and pulse.  I did review note from April 2020 where she saw her primary care physician at that time a BNP was normal and a chest x-ray did not suggest findings of volume overload.  The study described the pulmonary vasculature to be normal and better than when compared to the previous study  2019.  She continues to be without tobacco.  We did review her medications and the importance of taking her medications and not stopping them without updating us.    Reviewed By     Sophia Thomas MD on 2020 09:53    XR Chest 2 Views (Order [id number])   Imaging   Date: 2020  Department: [facility]/OB  Ordering/Authorizing: Sophia Thomas MD    Study Result     EXAM: XR CHEST 2 VIEWS  LOCATION: [facility]  DATE/TIME: 2020 2:52 PM     INDICATION: Wheezing, dry cough and dyspnea on exertion.  COMPARISON: 10/30/2019 CXR and CTA chest.     IMPRESSION:   Cardiac silhouette appears slightly smaller, pulmonary vascularity has normalized and previously seen interstitial edema and trace amount of pleural fluid have resolved since 10/30/2019. A few strands of fibrosis and/or linear atelectasis in   the lower lungs unchanged. Lungs otherwise clear. Opacity right cardiophrenic angle corresponds to prominent cardiac fat pad. Chest otherwise normal.       Roberto Mabry MD on 2020 11:49    Echo Complete   Order# [id number]   Reading physician: Abundio Harvey MD  Ordering physician: Roberto Mabry MD  Study date: 20    Performing Date  Performing Department    2020  [facility] CARDIAC TESTING [[id number]]    Patient Information     Patient Name   Catina Acuna  MRN   [id number]  Sex   Female   1   1960 (59 y.o.)    Indications     Dx: CAD (coronary artery disease) [I25.10 (ICD-10-CM)]    Summary       Left ventricle ejection fraction is mildly decreased. The calculated left ventricular ejection fraction is 49%.    Normal left ventricular size. The calculated left ventricular ejection fraction is 49%. E/e' < 8, suggesting normal LV filling pressure. Left ventricular diastolic function is normal.    The following segments are hypokinetic: basal inferior, basal inferolateral, basal anterolateral, mid inferior and mid inferolateral. All other segments are  normal.    Normal right ventricular size and systolic function.    No hemodynamically significant valvular heart abnormalities.    The ascending aorta is mildly dilated.    When compared to the previous study dated 10/30/2019, no significant change.      ocedures     Coronary Angiogram    Percutaneous Coronary Intervention    Panel Information     Panel 1     Provider  Role    Chip Escalera MD  Primary     Procedure  Laterality  Anesthesia    Coronary Angiogram  N/A  Conscious Sedation (RN)    Percutaneous Coronary Intervention  N/A  Conscious Sedation (RN)          Pre Procedure Diagnosis     SOB with elevated troponin    Indications     Non-STEMI (non-ST elevated myocardial infarction) (H) [I21.4 (ICD-10-CM)]     Procedure Status     Procedure scheduled as Urgent (Inpatient).          Conclusion       Ost RCA to Mid RCA lesion is 35% stenosed.    Mid RCA lesion is 100% stenosed.    Mid Cx to Dist Cx lesion is 80% stenosed.    Pressure wire/FFR was performed on the LCX lesion. pre diagnositic: 0.4. post diagnostic: 0.4.    A drug eluting stent was successfully placed into LCX.         I have reviewed and updated the patient's Past Medical History, Social History, Family History and Medication List.     Physical Examination not performed given phone encounter Review of Systems                                                Medical History  Surgical History Family History Social History   Past Medical History:   Diagnosis Date   ? Depression    ? NSTEMI (non-ST elevated myocardial infarction) (H) 10/30/2019    Past Surgical History:   Procedure Laterality Date   ? ANKLE FRACTURE SURGERY     ? CV CORONARY ANGIOGRAM N/A 10/31/2019    Procedure: Coronary Angiogram;  Surgeon: Chip Escalera MD;  Location: [facility];  Service: Cardiology    Family History   Problem Relation Age of Onset   ? Hypertension Mother    ? Heart failure Mother    ? Dementia Mother    ? Heart disease Mother    ? Hypertension  Father    ? Heart disease Father    ? Diabetes Father    ? Emphysema Father    ? Heart disease Sister 58        4 vessel CBAG    Social History     Socioeconomic History   ? Marital status: Single     Spouse name: Not on file   ? Number of children: Not on file   ? Years of education: Not on file   ? Highest education level: Not on file   Occupational History   ? Not on file   Social Needs   ? Financial resource strain: Not on file   ? Food insecurity     Worry: Not on file     Inability: Not on file   ? Transportation needs     Medical: Not on file     Non-medical: Not on file   Tobacco Use   ? Smoking status: Current Some Day Smoker     Packs/day: 0.10     Years: 20.00     Pack years: 2.00     Types: Cigarettes   ? Smokeless tobacco: Never Used   Substance and Sexual Activity   ? Alcohol use: No     Comment: h/o alcohol abuse    ? Drug use: No   ? Sexual activity: Never     Comment: single   Lifestyle   ? Physical activity     Days per week: Not on file     Minutes per session: Not on file   ? Stress: Not on file   Relationships   ? Social connections     Talks on phone: Not on file     Gets together: Not on file     Attends Tenriism service: Not on file     Active member of club or organization: Not on file     Attends meetings of clubs or organizations: Not on file     Relationship status: Not on file   ? Intimate partner violence     Fear of current or ex partner: Not on file     Emotionally abused: Not on file     Physically abused: Not on file     Forced sexual activity: Not on file   Other Topics Concern   ? Not on file   Social History Narrative   ? Not on file          Medications  Allergies   Current Outpatient Medications   Medication Sig Dispense Refill   ? acetaminophen (TYLENOL) 500 MG tablet Take 500 mg by mouth as needed for pain.     ? aspirin 81 mg chewable tablet Chew 1 tablet (81 mg total) daily. 30 tablet 0   ? atorvastatin (LIPITOR) 80 MG tablet Take 1 tablet (80 mg total) by mouth at  bedtime. 90 tablet 2   ? blood glucose test (CONTOUR NEXT TEST STRIPS) strips Use 1 each As Directed daily. Dispense brand per patient's insurance at pharmacy discretion. 100 strip 3   ? blood-glucose meter (CONTOUR NEXT METER) Misc Use 1 each As Directed daily. 1 each 0   ? fluticasone propion-salmeteroL (ADVAIR DISKUS) 250-50 mcg/dose DISKUS Inhale 1 puff 2 (two) times a day. 1 each 3   ? furosemide (LASIX) 40 MG tablet TAKE ONE TABLET BY MOUTH EVERY DAY 90 tablet 2   ? losartan (COZAAR) 25 MG tablet Take 1 tablet (25 mg total) by mouth daily. 90 tablet 1   ? metFORMIN (GLUCOPHAGE XR) 500 MG 24 hr tablet Take 4 tablets (2,000 mg total) by mouth daily with supper. 360 tablet 3   ? metoprolol succinate (TOPROL-XL) 25 MG Take 0.5 tablets (12.5 mg total) by mouth at bedtime. 45 tablet 2   ? miscellaneous medical supply (BLOOD PRESSURE CUFF) [facility] [place] to take BP daily. Pt states needs average size cuff. 1 each 1   ? nitroglycerin (NITROSTAT) 0.4 MG SL tablet Place 1 tablet (0.4 mg total) under the tongue every 5 (five) minutes as needed for chest pain. 25 tablet 1   ? ONETOUCH DELICA LANCETS 33 gauge Misc USE TO TEST BLOOD SUGAR ONCE DAILY 100 each 3   ? senna (SENOKOT) 8.6 mg tablet Take 1 tablet by mouth daily as needed for constipation.     ? ticagrelor (BRILINTA) 90 mg Tab Take 1 tablet (90 mg total) by mouth 2 (two) times a day. For 12 months 180 tablet 2   ? topiramate (TOPAMAX) 25 MG tablet Take one tablet twice daily for 1 week, then 2 tabs twice daily 120 tablet 2   ? DM/p-ephed/acetaminoph/doxylam (ROSARIO-SELTZER PLUS COLD+FLU ORAL) Take 1 tablet by mouth 4 (four) times a day as needed.       No current facility-administered medications for this visit.     No Known Allergies      Lab Results    Chemistry/lipid CBC Cardiac Enzymes/BNP/TSH/INR   Lab Results   Component Value Date    CHOL 131 01/17/2020    HDL 33 (L) 01/17/2020    LDLCALC 75 01/17/2020    TRIG 117 01/17/2020    CREATININE 0.82 04/23/2020    BUN 17  04/23/2020    K 4.4 04/23/2020     04/23/2020     04/23/2020    CO2 21 (L) 04/23/2020    Lab Results   Component Value Date    WBC 8.9 10/30/2019    HGB 11.1 (L) 10/30/2019    HCT 33.3 (L) 10/30/2019    MCV 90 10/30/2019     10/30/2019    Lab Results   Component Value Date    TROPONINI 0.59 (HH) 11/01/2019    BNP 33 04/23/2020    TSH 1.3 05/10/2012        Roberto Mabry

## 2021-06-29 NOTE — PROGRESS NOTES
"Progress Notes by Roberto Mabry MD at 4/21/2020  1:10 PM     Author: Roberto Mabry MD Service: -- Author Type: Physician    Filed: 4/21/2020  1:40 PM Encounter Date: 4/21/2020 Status: Signed    : Roberto Mabry MD (Physician)           The patient has been notified of following:     \"This telephone visit will be conducted via a call between you and your physician/provider. We have found that certain health care needs can be provided without the need for a physical exam.  This service lets us provide the care you need with a phone conversation.  If a prescription is necessary we can send it directly to your pharmacy.  If lab work is needed we can place an order for that and you can then stop by our lab to have the test done at a later time. If during the course of the call the physician/provider feels a telephone visit is not appropriate, you will not be charged for this service.\" Verbal consent has been obtained for this service by care team member:         HEART CARE PHONE ENCOUNTER        The patient has chosen to have the visit conducted as a telephone visit, to reduce risk of exposure given the current status of Coronavirus in our community. This telephone visit is being conducted via a call between the patient and physician/provider. Health care needs are being provided without a physical exam.     Assessment/Recommendations   Assessment:    1.  Coronary artery disease.  Patient admitted with increasing symptoms of shortness of breath October 2019 and subsequently underwent coronary intervention to the circumflex with residual stenosis in the right coronary artery.  She has noted some increasing shortness of breath with exertion which has not been progressive but noted over the past month or so.  She also reports some associated cough but no orthopnea or PND.  She also notes some intermittent wheezing.  I offered her an appointment in our office but because of transportation is difficult and she " preferred to be seen in her primary care provider's office and therefore I sent them a request.  She is asked to monitor for any increasing symptoms.  Difficult to determine on the phone if this is pulmonary or fluid retention related.  In addition the dry cough could be related to the lisinopril and some breathlessness could be related to Brilinta.  She will seek more immediate attention if symptoms are worsening or progressive but this has not been the case.  I would like an update after she is seen in her primary care physician's office.    2.  Risk factor modification.  She continues to be tobacco free.  She is requesting a blood pressure cuff as her blood pressure cuff does not seem accurate to her.  Her lipids from January 2020 found LDL cholesterol that is mildly above ideal goal at 75 and should follow-up in a few months to have that reevaluated.  She had mild elevation in her ALT which was repeated in February which appeared to be under good control.  She is on 80 mg of atorvastatin.    Plan:  1.  Appointment in her primary care physician's office within the next few days.  No change in medications at this time but would like to discuss further with her primary care provider after the visit.      Follow Up Plan: Follow up in 3 to 4 months.  I have reviewed the note as documented.  This accurately captures the substance of my conversation with the patient.    Total time of call between patient and provider was 15 minutes   Start Time:115  Stop Time:130       History of Present Illness/Subjective    Catina Acuna is a 59 y.o. female who is being evaluated via a billable telephone visit.    She told me that overall she feels well.  She did however tell me that she has noted some increased shortness of breath with activity over the past 1 month.  This is not been progressive and she is not identifying any orthopnea or PND or chest discomfort.  She states that her family has noticed that she is wheezing on  occasion.  She sounded perhaps mildly short of breath during our discussion but she was able to speak in full sentences.  She did not feel as if this was progressive or in need of immediate attention.  I offered her an office visit but it is difficult for her to get places because of lack of transportation and would like to be seen at her primary care physician's office and I did write a note to Dr. Raya regarding this possibility.    She has a history of coronary artery disease.  She was admitted with increasing symptoms of shortness of breath and heart failure symptoms.  She underwent successful stenting to the circumflex with residual stenosis in the right coronary artery.  She also states that her blood pressures been under good control but she is concerned that her blood pressure cuff is not accurate and she is asking for a prescription for a new blood pressure cuff.  She has remained without tobacco.  She also describes a dry cough intermittently.    EXAM: CTA CHEST PE RUN  LOCATION: [facility]  DATE/TIME: 10/30/2019 9:52 AM     INDICATION: PE suspected, intermediate prob, positive D-dimer dyspnea, elevated d-dimer, further delineate interstitial opacities possible new CHF  COMPARISON: Chest radiographs today.  TECHNIQUE: CT angiogram chest during arterial phase injection IV contrast. 2D and 3D MIP reconstructions were performed by the CT technologist. Dose reduction techniques were used.   CONTRAST: Iohexol (Omni) 100 mL     FINDINGS:  ANGIOGRAM CHEST: No pulmonary embolus. No aortic dissection or aneurysm.      LUNGS AND PLEURA: Mild airway thickening of the lower lobes. Mild dependent and basilar atelectasis.     MEDIASTINUM/AXILLAE: Normal.     UPPER ABDOMEN: Small hiatal hernia.     MUSCULOSKELETAL: Normal.     IMPRESSION:   No pulmonary embolus.    Panel Information     Panel 1     Provider  Role    Chip Escalera MD  Primary     Procedure  Laterality  Anesthesia    Coronary Angiogram  N/A   Conscious Sedation (RN)    Percutaneous Coronary Intervention  N/A  Conscious Sedation (RN)          Pre Procedure Diagnosis     SOB with elevated troponin    Indications     Non-STEMI (non-ST elevated myocardial infarction) (H) [I21.4 (ICD-10-CM)]     Procedure Status     Procedure scheduled as Urgent (Inpatient).          Conclusion       Ost RCA to Mid RCA lesion is 35% stenosed.    Mid RCA lesion is 100% stenosed.    Mid Cx to Dist Cx lesion is 80% stenosed.    Pressure wire/FFR was performed on the LCX lesion. pre diagnositic: 0.4. post diagnostic: 0.4.    A drug eluting stent was successfully placed into LCX.      Recommendations     ?  Patient given specific instructions regarding care of arteriotomy site, activity restrictions, signs and symptoms of cardiac or vascular complications and to seek immediate medical evaluation should they occur.  ? Arterial sheath removed from radial artery with TR Band placement.  ? Continuation of dual antiplatelet therapy for 12 month(s).  ? Risk factor management for atherosclerosis.  ? If symptoms persist and evidence viability inferior wall may consider for  PCI to RCA? Discontinue tobacco smoking.     Performing Date  Performing Department    2020   CARDIAC TESTING [[id number]]    Patient Information     Patient Name   Catina Acuna  MRN   [id number]  Sex   Female   1   1960 (59 y.o.)    Indications     Dx: CAD (coronary artery disease) [I25.10 (ICD-10-CM)]    Summary       Left ventricle ejection fraction is mildly decreased. The calculated left ventricular ejection fraction is 49%.    Normal left ventricular size. The calculated left ventricular ejection fraction is 49%. E/e' < 8, suggesting normal LV filling pressure. Left ventricular diastolic function is normal.    The following segments are hypokinetic: basal inferior, basal inferolateral, basal anterolateral, mid inferior and mid inferolateral. All other segments are normal.    Normal right  ventricular size and systolic function.    No hemodynamically significant valvular heart abnormalities.    The ascending aorta is mildly dilated.    When compared to the previous study dated 10/30/2019, no significant change.        MUSE DIAGNOSIS   ECG Clinic - Today   Collected:  01/06/20 [id number]    Result status:  Final    Resulting lab:  [facility]    Value:  Normal sinus rhythm   Inferior-posterior infarct (cited on or before 30-OCT-2019)   Abnormal ECG   When compared with ECG of 31-OCT-2019 14:18,   T wave inversion no longer evident in Anterior leads   QT has shortened   Confirmed by BARBER WINSOTN MD LOC: ([id number]) on 1/6/2020 4:42:54 PM         I have reviewed and updated the patient's Past Medical History, Social History, Family History and Medication List.     Physical Examination not performed given phone encounter Review of Systems                                                Medical History  Surgical History Family History Social History   Past Medical History:   Diagnosis Date   ? Depression    ? NSTEMI (non-ST elevated myocardial infarction) (H) 10/30/2019    Past Surgical History:   Procedure Laterality Date   ? ANKLE FRACTURE SURGERY     ? CV CORONARY ANGIOGRAM N/A 10/31/2019    Procedure: Coronary Angiogram;  Surgeon: Chip Escalera MD;  Location: [facility] Cath Lab;  Service: Cardiology    Family History   Problem Relation Age of Onset   ? Hypertension Mother    ? Heart failure Mother    ? Dementia Mother    ? Heart disease Mother    ? Hypertension Father    ? Heart disease Father    ? Diabetes Father    ? Emphysema Father     Social History     Socioeconomic History   ? Marital status: Single     Spouse name: Not on file   ? Number of children: Not on file   ? Years of education: Not on file   ? Highest education level: Not on file   Occupational History   ? Not on file   Social Needs   ? Financial resource strain: Not on file   ? Food insecurity     Worry: Not on file     Inability: Not on  file   ? Transportation needs     Medical: Not on file     Non-medical: Not on file   Tobacco Use   ? Smoking status: Current Some Day Smoker     Packs/day: 0.10     Years: 20.00     Pack years: 2.00     Types: Cigarettes   ? Smokeless tobacco: Never Used   Substance and Sexual Activity   ? Alcohol use: No     Comment: h/o alcohol abuse    ? Drug use: No   ? Sexual activity: Never     Comment: single   Lifestyle   ? Physical activity     Days per week: Not on file     Minutes per session: Not on file   ? Stress: Not on file   Relationships   ? Social connections     Talks on phone: Not on file     Gets together: Not on file     Attends Cheondoism service: Not on file     Active member of club or organization: Not on file     Attends meetings of clubs or organizations: Not on file     Relationship status: Not on file   ? Intimate partner violence     Fear of current or ex partner: Not on file     Emotionally abused: Not on file     Physically abused: Not on file     Forced sexual activity: Not on file   Other Topics Concern   ? Not on file   Social History Narrative   ? Not on file          Medications  Allergies   Current Outpatient Medications   Medication Sig Dispense Refill   ? acetaminophen (TYLENOL) 500 MG tablet Take 500 mg by mouth as needed for pain.     ? aspirin 81 mg chewable tablet Chew 1 tablet (81 mg total) daily. 30 tablet 0   ? atorvastatin (LIPITOR) 80 MG tablet Take 1 tablet (80 mg total) by mouth at bedtime. 90 tablet 2   ? blood glucose meter (GLUCOMETER) Use 1 each As Directed daily. Dispense glucometer brand per patient's insurance at pharmacy discretion. 1 each 0   ? furosemide (LASIX) 40 MG tablet TAKE ONE TABLET BY MOUTH EVERY DAY 90 tablet 2   ? lisinopriL (PRINIVIL,ZESTRIL) 5 MG tablet Take 1 tablet (5 mg total) by mouth daily. 90 tablet 2   ? metFORMIN (GLUCOPHAGE XR) 500 MG 24 hr tablet Take 4 tablets (2,000 mg total) by mouth daily with supper. 360 tablet 0   ? metoprolol succinate  (TOPROL-XL) 25 MG Take 0.5 tablets (12.5 mg total) by mouth at bedtime. 45 tablet 3   ? ONETOUCH DELICA LANCETS 33 gauge Misc USE TO TEST BLOOD SUGAR ONCE DAILY 100 each 3   ? ONETOUCH VERIO strips USE TO TEST BLOOD SUGAR ONCE DAILY AS DIRECTED 100 strip 3   ? senna (SENOKOT) 8.6 mg tablet Take 1 tablet by mouth daily as needed for constipation.     ? ticagrelor (BRILINTA) 90 mg Tab Take 1 tablet (90 mg total) by mouth 2 (two) times a day. For 12 months 180 tablet 2   ? DM/p-ephed/acetaminoph/doxylam (ROSARIO-SELTZER PLUS COLD+FLU ORAL) Take 1 tablet by mouth 4 (four) times a day as needed.     ? nitroglycerin (NITROSTAT) 0.4 MG SL tablet Place 1 tablet (0.4 mg total) under the tongue every 5 (five) minutes as needed for chest pain. 25 tablet 1     No current facility-administered medications for this visit.     No Known Allergies      Lab Results    Chemistry/lipid CBC Cardiac Enzymes/BNP/TSH/INR   Lab Results   Component Value Date    CHOL 131 01/17/2020    HDL 33 (L) 01/17/2020    LDLCALC 75 01/17/2020    TRIG 117 01/17/2020    CREATININE 0.82 01/17/2020    BUN 21 01/17/2020    K 3.5 01/17/2020     (L) 01/17/2020     01/17/2020    CO2 22 01/17/2020    Lab Results   Component Value Date    WBC 8.9 10/30/2019    HGB 11.1 (L) 10/30/2019    HCT 33.3 (L) 10/30/2019    MCV 90 10/30/2019     10/30/2019    Lab Results   Component Value Date    TROPONINI 0.59 (HH) 11/01/2019     (H) 10/30/2019    TSH 1.3 05/10/2012        Roberto Mabry

## 2021-06-30 NOTE — PROGRESS NOTES
"Progress Notes by Roberto Mabry MD at 12/21/2020  4:10 PM     Author: Roberto Mabry MD Service: -- Author Type: Physician    Filed: 12/21/2020  4:29 PM Encounter Date: 12/21/2020 Status: Signed    : Roberto Mabry MD (Physician)           The patient has been notified of following:     \"This telephone visit will be conducted via a call between you and your physician/provider. We have found that certain health care needs can be provided without the need for a physical exam.  This service lets us provide the care you need with a phone conversation.  If a prescription is necessary we can send it directly to your pharmacy.  If lab work is needed we can place an order for that and you can then stop by our lab to have the test done at a later time. If during the course of the call the physician/provider feels a telephone visit is not appropriate, you will not be charged for this service.\" Verbal consent has been obtained for this service by care team member:         HEART CARE PHONE ENCOUNTER        The patient has chosen to have the visit conducted as a telephone visit, to reduce risk of exposure given the current status of Coronavirus in our community. This telephone visit is being conducted via a call between the patient and physician/provider. Health care needs are being provided without a physical exam.     Assessment/Recommendations   Assessment:    1.  Coronary artery disease.  Patient admitted in October 2019 with increasing shortness of breath and findings of acute coronary syndrome with intervention to the circumflex and residual 100% stenosis of the distal RCA.  Overall she is been feeling well.  She is asked to monitor for chest pain or increasing shortness of breath or fluid retention.  She is going to continue with the current combination of medications.  We talked about finishing her bottle of Brilinta and then updating us before running out approximately 1 week.  2.  Mild LV dysfunction.  " Ideally would like to repeat the echocardiogram.  Her laboratory studies from October 2020 are reviewed with her potassium BUN and creatinine were stable.  At this point she is going to remain on the current dose of furosemide.  3.  Hypertension.  She has not been able to monitor her blood pressure stating that the blood pressure cuff at home does not seem accurate.  Again it was my recommendation that she strongly consider coming into the office for formal evaluation.  4.  Dyslipidemia.  Lipids from October 2020 found an LDL of 86 on 80 mg of atorvastatin with ideal LDL less than 70.  I suggested that we add Zetia 10 mg daily.  I discussed the potential side effects and plans for follow-up cholesterol and liver tests in 2 months.    Plan:  1.  Zetia 10 mg daily.  2.  I have offered her a follow-up visit hopefully in 6 to 8 weeks.  She has a fair amount of reluctance as a relates to Covid and states that she is not been going out of the house.  She will monitor for symptoms I have asked her to update us when she is running low on the current bottle of Brilinta.      Follow Up Plan: Follow up in 6 weeks with nurse practitioner.  I have reviewed the note as documented.  This accurately captures the substance of my conversation with the patient.    Total time of call between patient and provider was 18 minutes   Start Time:400  Stop Time:418       History of Present Illness/Subjective    Catina Acuna is a 60 y.o. female who is being evaluated via a billable telephone visit.    Patient strong preference was to do a visit by telephone.  She does not have access to video conferencing and is very reluctant to come out in the setting of Covid.  I did tell her that ideally would be beneficial to see her in follow-up.  She states she has been feeling well overall.  She notes some very vague mild arm discomfort which she thinks is more related to arthritis.  She does not have any complaints of exertional chest discomfort.   She states that she goes up and down the stairs a few times for her laundry she might feel a little breathless.  She tells me she has approximately 2 months left of the current Brilinta at home.    She presented with acute coronary syndrome 2019 and had a intervention to an 80% circumflex stenosis with 100% mid RCA stenosis.  Ejection fraction was reported to be mildly reduced with the most recent echocardiogram being 2020 where ejection fraction was said to be 49%.  I did talk about follow-up echocardiography to reevaluate LV function but again she is quite reluctant to do any testing given the current Covid situation.    Patient did see her primary care provider in October.  Patient declined CPAP and recommendations for mandibular advancement device.  Diabetes is being managed by her primary care provider.    2020 [facility] CARDIAC TESTING [[id number]]   Patient Information    Patient Name   Catina Acuna MRN   [id number] Sex   Female  1   1960 (59 y.o.)   Indications    Dx: CAD (coronary artery disease) [I25.10 (ICD-10-CM)]   Summary      Left ventricle ejection fraction is mildly decreased. The calculated left ventricular ejection fraction is 49%.    Normal left ventricular size. The calculated left ventricular ejection fraction is 49%. E/e' < 8, suggesting normal LV filling pressure. Left ventricular diastolic function is normal.    The following segments are hypokinetic: basal inferior, basal inferolateral, basal anterolateral, mid inferior and mid inferolateral. All other segments are normal.    Normal right ventricular size and systolic function.    No hemodynamically significant valvular heart abnormalities.    The ascending aorta is mildly dilated.    When compared to the previous study dated 10/30/2019, no significant change.        Catina Acuna MRN   [id number] Sex   Female  1   1960 (58 y.o.)   Physicians    Panel Physicians Referring Physician Case Authorizing  Physician   Chip Escalera MD (Primary) Eriberto Croft MD Halbe, Dennis W, MD    PCP    Primary Care Provider   Lucretia Raya MD   Phone: [phone]      Procedures    Coronary Angiogram   Percutaneous Coronary Intervention   Panel Information    Panel 1    Provider Role   Chip Escalera MD Primary    Procedure Laterality Anesthesia   Coronary Angiogram N/A Conscious Sedation (RN)   Percutaneous Coronary Intervention N/A Conscious Sedation (RN)         Pre Procedure Diagnosis    SOB with elevated troponin   Indications    Non-STEMI (non-ST elevated myocardial infarction) (H) [I21.4 (ICD-10-CM)]    Procedure Status    Procedure scheduled as Urgent (Inpatient).         Conclusion      Ost RCA to Mid RCA lesion is 35% stenosed.    Mid RCA lesion is 100% stenosed.    Mid Cx to Dist Cx lesion is 80% stenosed.    Pressure wire/FFR was performed on the LCX lesion. pre diagnositic: 0.4. post diagnostic: 0.4.    A drug eluting stent was successfully placed into LCX.      Recommendations    ?  Patient given specific instructions regarding care of arteriotomy site, activity restrictions, signs and symptoms of cardiac or vascular complications and to seek immediate medical evaluation should they occur.  ? Arterial sheath removed from radial artery with TR Band placement.  ? Continuation of dual antiplatelet therapy for 12 month(s).  ? Risk factor management for atherosclerosis.  ? If symptoms persist and evidence viability inferior wall may consider for  PCI to RCA? Discontinue tobacco smoking.       MUSE DIAGNOSIS  ECG Clinic - Today  Collected: 01/06/20 1546   Result status: Final   Resulting lab: [facility]USE   Value: Normal sinus rhythm   Inferior-posterior infarct (cited on or before 30-OCT-2019)   Abnormal ECG   When compared with ECG of 31-OCT-2019 14:18,   T wave inversion no longer evident in Anterior leads   QT has shortened   Confirmed by BARBER WINSTON MD LOC:[facility] ([id number]) on 1/6/2020 4:42:54 PM           I have reviewed and updated the patient's Past Medical History, Social History, Family History and Medication List.     Physical Examination not performed given phone encounter Review of Systems                                                Medical History  Surgical History Family History Social History   Past Medical History:   Diagnosis Date   ? Depression    ? NSTEMI (non-ST elevated myocardial infarction) (H) 10/30/2019    Past Surgical History:   Procedure Laterality Date   ? ANKLE FRACTURE SURGERY     ? CV CORONARY ANGIOGRAM N/A 10/31/2019    Procedure: Coronary Angiogram;  Surgeon: Chip Escalera MD;  Location[facility] Cath Lab;  Service: Cardiology    Family History   Problem Relation Age of Onset   ? Hypertension Mother    ? Heart failure Mother    ? Dementia Mother    ? Heart disease Mother    ? Hypertension Father    ? Heart disease Father    ? Diabetes Father    ? Emphysema Father    ? Heart disease Sister 58        4 vessel CBAG    Social History     Socioeconomic History   ? Marital status: Single     Spouse name: Not on file   ? Number of children: Not on file   ? Years of education: Not on file   ? Highest education level: Not on file   Occupational History   ? Not on file   Social Needs   ? Financial resource strain: Not on file   ? Food insecurity     Worry: Not on file     Inability: Not on file   ? Transportation needs     Medical: Not on file     Non-medical: Not on file   Tobacco Use   ? Smoking status: Former Smoker     Packs/day: 0.10     Years: 20.00     Pack years: 2.00     Types: Cigarettes   ? Smokeless tobacco: Never Used   Substance and Sexual Activity   ? Alcohol use: No     Comment: h/o alcohol abuse    ? Drug use: No   ? Sexual activity: Never     Comment: single   Lifestyle   ? Physical activity     Days per week: Not on file     Minutes per session: Not on file   ? Stress: Not on file   Relationships   ? Social connections     Talks on phone: Not on file     Gets  together: Not on file     Attends Cheondoism service: Not on file     Active member of club or organization: Not on file     Attends meetings of clubs or organizations: Not on file     Relationship status: Not on file   ? Intimate partner violence     Fear of current or ex partner: Not on file     Emotionally abused: Not on file     Physically abused: Not on file     Forced sexual activity: Not on file   Other Topics Concern   ? Not on file   Social History Narrative   ? Not on file          Medications  Allergies   Current Outpatient Medications   Medication Sig Dispense Refill   ? acetaminophen (TYLENOL) 500 MG tablet Take 500 mg by mouth as needed for pain.     ? ADVAIR DISKUS 250-50 mcg/dose DISKUS INHALE ONE PUFF BY MOUTH TWICE A DAY 60 each 3   ? aspirin 81 mg chewable tablet Chew 1 tablet (81 mg total) daily. 30 tablet 0   ? atorvastatin (LIPITOR) 80 MG tablet Take 1 tablet (80 mg total) by mouth at bedtime. 90 tablet 2   ? blood glucose test (CONTOUR NEXT TEST STRIPS) strips Use 1 each As Directed daily. Dispense brand per patient's insurance at pharmacy discretion. 100 strip 3   ? blood-glucose meter (CONTOUR NEXT METER) Misc Use 1 each As Directed daily. 1 each 0   ? furosemide (LASIX) 40 MG tablet TAKE ONE TABLET BY MOUTH EVERY DAY 90 tablet 0   ? losartan (COZAAR) 25 MG tablet Take 1 tablet (25 mg total) by mouth daily. 90 tablet 1   ? metFORMIN (GLUCOPHAGE XR) 500 MG 24 hr tablet Take 4 tablets (2,000 mg total) by mouth daily with supper. 360 tablet 3   ? metoprolol succinate (TOPROL-XL) 25 MG Take 0.5 tablets (12.5 mg total) by mouth at bedtime. 45 tablet 2   ? miscellaneous medical supply (BLOOD PRESSURE CUFF) Misc Pt to take BP daily. Pt states needs average size cuff. 1 each 1   ? nitroglycerin (NITROSTAT) 0.4 MG SL tablet Place 1 tablet (0.4 mg total) under the tongue every 5 (five) minutes as needed for chest pain. 25 tablet 1   ? ONETOUCH DELICA PLUS LANCET 33 gauge Misc USE TO TEST BLOOD SUGAR  ONCE DAILY 100 each 3   ? senna (SENOKOT) 8.6 mg tablet Take 1 tablet by mouth daily as needed for constipation.     ? ticagrelor (BRILINTA) 90 mg Tab Take 1 tablet (90 mg total) by mouth 2 (two) times a day. For 12 months 180 tablet 2   ? DM/p-ephed/acetaminoph/doxylam (ROSARIO-SELTZER PLUS COLD+FLU ORAL) Take 1 tablet by mouth 4 (four) times a day as needed.     ? ezetimibe (ZETIA) 10 mg tablet Take 1 tablet (10 mg total) by mouth daily. 90 tablet 3     No current facility-administered medications for this visit.     No Known Allergies      Lab Results    Chemistry/lipid CBC Cardiac Enzymes/BNP/TSH/INR   Lab Results   Component Value Date    CHOL 154 10/12/2020    HDL 38 (L) 10/12/2020    LDLCALC 86 10/12/2020    TRIG 151 (H) 10/12/2020    CREATININE 0.87 10/12/2020    BUN 20 10/12/2020    K 4.9 10/12/2020     10/12/2020     (H) 10/12/2020    CO2 22 10/12/2020    Lab Results   Component Value Date    WBC 8.9 10/30/2019    HGB 11.1 (L) 10/30/2019    HCT 33.3 (L) 10/30/2019    MCV 90 10/30/2019     10/30/2019    Lab Results   Component Value Date    TROPONINI 0.59 (HH) 11/01/2019    BNP 33 04/23/2020    TSH 2.76 10/12/2020        Roberto Mabry

## 2021-06-30 NOTE — PROGRESS NOTES
"Progress Notes by Roberto Mabry MD at 3/25/2021  4:30 PM     Author: Roberto Mabry MD Service: -- Author Type: Physician    Filed: 3/25/2021  5:35 PM Encounter Date: 3/25/2021 Status: Signed    : Roberto Mabry MD (Physician)           The patient has been notified of following:     \"This telephone visit will be conducted via a call between you and your physician/provider. We have found that certain health care needs can be provided without the need for a physical exam.  This service lets us provide the care you need with a phone conversation.  If a prescription is necessary we can send it directly to your pharmacy.  If lab work is needed we can place an order for that and you can then stop by our lab to have the test done at a later time. If during the course of the call the physician/provider feels a telephone visit is not appropriate, you will not be charged for this service.\" Verbal consent has been obtained for this service by care team member:         HEART CARE PHONE ENCOUNTER        The patient has chosen to have the visit conducted as a telephone visit, to reduce risk of exposure given the current status of Coronavirus in our community. This telephone visit is being conducted via a call between the patient and physician/provider. Health care needs are being provided without a physical exam.     Assessment/Recommendations   Assessment:    1.  Coronary artery disease.  Patient admitted October 2019 with acute coronary syndrome  with increasing shortness of breath and chest discomfort.  No ongoing similar symptoms.  She is still taking aspirin and Brilinta.  I reviewed this in detail.  We reviewed the dual antiplatelet calculator.  I reviewed the risk of thrombosis versus risk of bleeding.  Risk of stent thrombosis if she continued dual antiplatelet agents for upwards of 30 months would be 4.1% and 7.7% if she stops the medication.  Mace would be similar with the decreased risk of bleeding going " from 2.3% to 1.4%.  We did discuss either changing the Brilinta to 60 mg twice daily or changing to Plavix.  We indicated that we would discuss this further pending the results of the echocardiogram.  I did make her aware of the increased risk of potential significant bleeding and to monitor closely.    2.  Mild LV dysfunction.  She is now in agreement with checking a repeat echocardiogram which we will plan.  She is not endorsing any symptoms of heart failure on the telephone conversation today.    3.  Hypertension.  She has not been able to monitor her blood pressure which I did encourage her to consider.  4.  Dyslipidemia.  She is tolerating the addition of Zetia but has not yet had follow-up laboratory studies due to her which not to come in until she received a vaccine which she recently did.  We are going to plan follow-up laboratory studies including lipid and chemistries.  LDL was above ideal goal of 80 6 October 2020.  Plan:  1.  As outlined above      Follow Up Plan: Follow up in 3 to 4 months  I have reviewed the note as documented.  This accurately captures the substance of my conversation with the patient.    Total time of call between patient and provider was 15 minutes   Start Time:430  Stop Time:445       History of Present Illness/Subjective    Catina Acuna is a 60 y.o. female who is being evaluated via a billable telephone visit.  She preferred telephone rather than video and is still trying to avoid coming into the office because of Covid although recently had her first Covid injection and now would be willing to come in.  She reports feeling well.  She has had no particular complaints of anginal type chest discomfort or symptoms reminiscent of the symptoms that prompted admission October 2019.  She presented with acute coronary syndrome October 2019 and had intervention to an 80% circumflex stenosis with 100% mid RCA stenosis that was treated medically.  She has been describing some right arm  discomfort which she states has since resolved.  She has not been able to monitor her blood pressure and therefore not able to give me a blood pressure or pulse readings today.  I reviewed her current combination of medications which she is tolerating.  Previously noted is that she has declined CPAP.  I am very pleased to hear today that she has discontinued tobacco except for an occasional cigarette.  She is congratulated on these efforts.      I have reviewed and updated the patient's Past Medical History, Social History, Family History and Medication List.     Physical Examination not performed given phone encounter Review of Systems                                                Medical History  Surgical History Family History Social History   Past Medical History:   Diagnosis Date   ? Depression    ? NSTEMI (non-ST elevated myocardial infarction) (H) 10/30/2019    Past Surgical History:   Procedure Laterality Date   ? ANKLE FRACTURE SURGERY     ? CV CORONARY ANGIOGRAM N/A 10/31/2019    Procedure: Coronary Angiogram;  Surgeon: Chip Escalera MD;  Location: [facility] Cath Lab;  Service: Cardiology    Family History   Problem Relation Age of Onset   ? Hypertension Mother    ? Heart failure Mother    ? Dementia Mother    ? Heart disease Mother    ? Hypertension Father    ? Heart disease Father    ? Diabetes Father    ? Emphysema Father    ? Heart disease Sister 58        4 vessel CBAG    Social History     Socioeconomic History   ? Marital status: Single     Spouse name: Not on file   ? Number of children: Not on file   ? Years of education: Not on file   ? Highest education level: Not on file   Occupational History   ? Not on file   Social Needs   ? Financial resource strain: Not on file   ? Food insecurity     Worry: Not on file     Inability: Not on file   ? Transportation needs     Medical: Not on file     Non-medical: Not on file   Tobacco Use   ? Smoking status: Former Smoker     Packs/day: 0.10      Years: 20.00     Pack years: 2.00     Types: Cigarettes   ? Smokeless tobacco: Never Used   Substance and Sexual Activity   ? Alcohol use: No     Comment: h/o alcohol abuse    ? Drug use: No   ? Sexual activity: Never     Comment: single   Lifestyle   ? Physical activity     Days per week: Not on file     Minutes per session: Not on file   ? Stress: Not on file   Relationships   ? Social connections     Talks on phone: Not on file     Gets together: Not on file     Attends Restorationism service: Not on file     Active member of club or organization: Not on file     Attends meetings of clubs or organizations: Not on file     Relationship status: Not on file   ? Intimate partner violence     Fear of current or ex partner: Not on file     Emotionally abused: Not on file     Physically abused: Not on file     Forced sexual activity: Not on file   Other Topics Concern   ? Not on file   Social History Narrative   ? Not on file          Medications  Allergies   Current Outpatient Medications   Medication Sig Dispense Refill   ? acetaminophen (TYLENOL) 500 MG tablet Take 500 mg by mouth as needed for pain.     ? ADVAIR DISKUS 250-50 mcg/dose DISKUS INHALE ONE PUFF BY MOUTH TWICE A DAY 60 each 3   ? aspirin 81 mg chewable tablet Chew 1 tablet (81 mg total) daily. 30 tablet 0   ? atorvastatin (LIPITOR) 80 MG tablet Take 1 tablet (80 mg total) by mouth at bedtime. 90 tablet 2   ? blood glucose test (CONTOUR NEXT TEST STRIPS) strips Use 1 each As Directed daily. Dispense brand per patient's insurance at pharmacy discretion. 100 strip 3   ? blood-glucose meter (CONTOUR NEXT METER) Misc Use 1 each As Directed daily. 1 each 0   ? ezetimibe (ZETIA) 10 mg tablet Take 1 tablet (10 mg total) by mouth daily. 90 tablet 3   ? furosemide (LASIX) 40 MG tablet TAKE ONE TABLET BY MOUTH EVERY DAY 90 tablet 0   ? losartan (COZAAR) 25 MG tablet Take 1 tablet (25 mg total) by mouth daily. 90 tablet 2   ? metFORMIN (GLUCOPHAGE XR) 500 MG 24 hr tablet  Take 4 tablets (2,000 mg total) by mouth daily with supper. 360 tablet 3   ? metoprolol succinate (TOPROL-XL) 25 MG Take 0.5 tablets (12.5 mg total) by mouth at bedtime. 45 tablet 2   ? miscellaneous medical supply (BLOOD PRESSURE CUFF) [facility] [place] [facility] Pt to take BP daily. Pt states needs average size cuff. 1 each 1   ? nitroglycerin (NITROSTAT) 0.4 MG SL tablet Place 1 tablet (0.4 mg total) under the tongue every 5 (five) minutes as needed for chest pain. 25 tablet 1   ? ONETOUCH DELICA PLUS LANCET 33 gauge Misc USE TO TEST BLOOD SUGAR ONCE DAILY 100 each 3   ? senna (SENOKOT) 8.6 mg tablet Take 1 tablet by mouth daily as needed for constipation.     ? ticagrelor (BRILINTA) 90 mg Tab Take 1 tablet (90 mg total) by mouth 2 (two) times a day. For 12 months 180 tablet 2     No current facility-administered medications for this visit.     No Known Allergies      Lab Results    Chemistry/lipid CBC Cardiac Enzymes/BNP/TSH/INR   Lab Results   Component Value Date    CHOL 154 10/12/2020    HDL 38 (L) 10/12/2020    LDLCALC 86 10/12/2020    TRIG 151 (H) 10/12/2020    CREATININE 0.87 10/12/2020    BUN 20 10/12/2020    K 4.9 10/12/2020     10/12/2020     (H) 10/12/2020    CO2 22 10/12/2020    Lab Results   Component Value Date    WBC 8.9 10/30/2019    HGB 11.1 (L) 10/30/2019    HCT 33.3 (L) 10/30/2019    MCV 90 10/30/2019     10/30/2019    Lab Results   Component Value Date    TROPONINI 0.59 (HH) 11/01/2019    BNP 33 04/23/2020    TSH 2.76 10/12/2020        Roberto Mabry

## 2021-07-03 NOTE — ADDENDUM NOTE
Addendum Note by Tuyet Vyas CMA at 10/12/2020  9:30 AM     Author: Tuyet Vyas CMA Service: -- Author Type: Certified Medical Assistant    Filed: 10/12/2020 10:39 AM Encounter Date: 10/12/2020 Status: Signed    : Tuyet Vyas CMA (Certified Medical Assistant)    Addended by: TUYET VYAS on: 10/12/2020 10:39 AM        Modules accepted: Orders

## 2021-07-03 NOTE — ADDENDUM NOTE
Addendum Note by Aruna Alejo CMA at 5/19/2020 12:54 PM     Author: Aruna Alejo CMA Service: -- Author Type: Certified Medical Assistant    Filed: 5/19/2020 12:54 PM Encounter Date: 5/18/2020 Status: Signed    : Aruna Alejo CMA (Certified Medical Assistant)    Addended by: ARUNA ALEJO on: 5/19/2020 12:54 PM        Modules accepted: Orders

## 2021-07-03 NOTE — ADDENDUM NOTE
Addendum Note by Tuyet Vyas CMA at 12/8/2017  2:27 PM     Author: Tuyet Vyas CMA Service: -- Author Type: Certified Medical Assistant    Filed: 12/8/2017  2:27 PM Encounter Date: 12/7/2017 Status: Signed    : Tuyet Vyas CMA (Certified Medical Assistant)    Addended by: TUYET VYAS on: 12/8/2017 02:27 PM        Modules accepted: Orders

## 2021-07-06 VITALS — BODY MASS INDEX: 31.32 KG/M2 | HEIGHT: 65 IN | WEIGHT: 188 LBS

## 2021-07-14 PROBLEM — I21.4 NSTEMI (NON-ST ELEVATED MYOCARDIAL INFARCTION) (H): Status: RESOLVED | Noted: 2019-10-30 | Resolved: 2020-01-20

## 2021-07-27 DIAGNOSIS — E11.9 TYPE 2 DIABETES MELLITUS (H): ICD-10-CM

## 2021-07-31 RX ORDER — METFORMIN HCL 500 MG
2000 TABLET, EXTENDED RELEASE 24 HR ORAL
Qty: 360 TABLET | Refills: 1 | Status: SHIPPED | OUTPATIENT
Start: 2021-07-31 | End: 2022-03-11

## 2021-07-31 NOTE — TELEPHONE ENCOUNTER
"Rx able to be refilled per RN [facility] refill protocol.      ________________________________________________________________    Copy of Last Rx:        Last office visit with provider:  12/12/20  ________________________________________________________________        Requested Prescriptions   Pending Prescriptions Disp Refills     metFORMIN (GLUCOPHAGE-XR) 500 MG 24 hr tablet 360 tablet 3     Sig: Take 4 tablets (2,000 mg) by mouth daily (with dinner)       Biguanide Agents Failed - 7/28/2021  9:58 AM        Failed - Patient has documented A1c within the specified period of time.     If HgbA1C is 8 or greater, it needs to be on file within the past 3 months.  If less than 8, must be on file within the past 6 months.     Recent Labs   Lab Test 10/12/20  0928   A1C 7.3*             Failed - Recent (6 mo) or future (30 days) visit within the authorizing provider's specialty     Patient had office visit in the last 6 months or has a visit in the next 30 days with authorizing provider or within the authorizing provider's specialty.  See \"Patient Info\" tab in inbasket, or \"Choose Columns\" in Meds & Orders section of the refill encounter.            Passed - Patient is age 10 or older        Passed - Patient's CR is NOT>1.4 OR Patient's EGFR is NOT<45 within past 12 mos.     Recent Labs   Lab Test 06/04/21  1100   GFRESTIMATED 60*   GFRESTBLACK >60       Recent Labs   Lab Test 06/04/21  1100   CR 0.95             Passed - Patient does NOT have a diagnosis of CHF.        Passed - Medication is active on med list        Passed - Patient is not pregnant        Passed - Patient has not had a positive pregnancy test within the past 12 mos.              Mi Tsang RN 07/31/21 5:17 PM  "

## 2021-08-06 NOTE — PATIENT INSTRUCTIONS - HE
Patient Instructions by Lucretia Raya MD at 10/12/2020  9:30 AM     Author: Lucretia Raya MD Service: -- Author Type: Physician    Filed: 10/12/2020 10:30 AM Encounter Date: 10/12/2020 Status: Addendum    : Lucretia Raya MD (Physician)    Related Notes: Original Note by Lucretia Raya MD (Physician) filed at 10/12/2020 10:01 AM         I recommend that you received a tetanus booster, specifically Tdap.  Consider Shingrix (the new shingles vaccine, 2 dose series) to reduce your risk of shingles.  Check your insurance coverage first.     Patient Education     Your Health Risk Assessment indicates you feel you are not in good physical health.    A healthy lifestyle helps keep the body fit and the mind alert. It helps protect you from disease, helps you fight disease, and helps prevent chronic disease (disease that doesn't go away) from getting worse. This is important as you get older and begin to notice twinges in muscles and joints and a decline in the strength and stamina you once took for granted. A healthy lifestyle includes good healthcare, good nutrition, weight control, recreation, and regular exercise. Avoid harmful substances and do what you can to keep safe. Another part of a healthy lifestyle is stay mentally active and socially involved.    Good healthcare     Have a wellness visit every year.     If you have new symptoms, let us know right away. Don't wait until the next checkup.     Take medicines exactly as prescribed and keep your medicines in a safe place. Tell us if your medicine causes problems.   Healthy diet and weight control     Eat 3 or 4 small, nutritious, low-fat, high-fiber meals a day. Include a variety of fruits, vegetables, and whole-grain foods.     Make sure you get enough calcium in your diet. Calcium, vitamin D, and exercise help prevent osteoporosis (bone thinning).     If you live alone, try eating with others when you can. That way you get a good meal  and have company while you eat it.     Try to keep a healthy weight. If you eat more calories than your body uses for energy, it will be stored as fat and you will gain weight.     Recreation   Recreation is not limited to sports and team events. It includes any activity that provides relaxation, interest, enjoyment, and exercise. Recreation provides an outlet for physical, mental, and social energy. It can give a sense of worth and achievement. It can help you stay healthy.       Patient Education     Exercise for a Healthier Heart  You may wonder how you can improve the health of your heart. If youre thinking about exercise, youre on the right track. You dont need to become an athlete, but you do need a certain amount of brisk exercise to help strengthen your heart. If you have been diagnosed with a heart condition, your doctor may recommend exercise to help stabilize your condition. To help make exercise a habit, choose safe, fun activities.       Be sure to check with your health care provider before starting an exercise program.    Why exercise?  Exercising regularly offers many healthy rewards. It can help you do all of the following:    Improve your blood cholesterol levels to help prevent further heart trouble    Lower your blood pressure to help prevent a stroke or heart attack    Control diabetes, or reduce your risk of getting this disease    Improve your heart and lung function    Reach and maintain a healthy weight    Make your muscles stronger and more limber so you can stay active    Prevent falls and fractures by slowing the loss of bone mass (osteoporosis)    Manage stress better  Exercise tips  Ease into your routine. Set small goals. Then build on them.  Exercise on most days. Aim for a total of 150 or more minutes of moderate to  vigorous intensity activity each week. Consider 40 minutes, 3 to 4 times a week. For best results, activity should last for 40 minutes on average. It is OK to work up to  the 40 minute period over time. Examples of moderate-intensity activity is walking one mile in 15 minutes or 30 to 45 minutes of yard work.  Step up your daily activity level. Along with your exercise program, try being more active throughout the day. Walk instead of drive. Do more household tasks or yard work.  Choose one or more activities you enjoy. Walking is one of the easiest things you can do. You can also try swimming, riding a bike, or taking an exercise class.  Stop exercising and call your doctor if you:    Have chest pain or feel dizzy or lightheaded    Feel burning, tightness, pressure, or heaviness in your chest, neck, shoulders, back, or arms    Have unusual shortness of breath    Have increased joint or muscle pain    Have palpitations or an irregular heartbeat      8438-7636 [employer]. [address] [place] [address]92. All rights reserved. This information is not intended as a substitute for professional medical care. Always follow your healthcare professional's instructions.         Patient Education   Instrumental Activities of Daily Living  Your Health Risk Assessment indicates you have difficulties with instrumental activities of daily living which include laundry, housekeeping, banking, shopping, using the telephone, food preparation, transportation, or taking your own medications. Please make a follow up appointment for us to address this issue in more detail.    [employer] has resources available on the following website: [email or web address]     Also, here is a local agency that provides help with meals and other assistance:   [facility] Line: 244.951.5342     Patient Education   Urinary Incontinence, Female (Adult)  Urinary incontinence means loss of control of the bladder. This problem affects many women, especially as they get older. If you have incontinence, you may be embarrassed to ask for help. But know that this problem can be  treated.  Types of Incontinence  There are different types of incontinence. Two of the main types are described here. You can have more than one type.    Stress incontinence. With this type, urine leaks when pressure (stress) is put on the bladder. This may happen when you cough, sneeze, or laugh. Stress incontinence most often occurs because the pelvic floor muscles that support the bladder and urethra are weak. This can happen after pregnancy and vaginal childbirth or a hysterectomy. It can also be due to excess body weight or hormone changes.    Urge incontinence (also called overactive bladder). With this type, a sudden urge to urinate is felt often. This may happen even though there may not be much urine in the bladder. The need to urinate often during the night is common. Urge incontinence most often occurs because of bladder spasms. This may be due to bladder irritation or infection. Damage to bladder nerves or pelvic muscles, constipation, and certain medicines can also lead to urge incontinence.  Treatment of urinary incontinence depends on the cause. Further evaluation is needed to find the type you have. This will likely include an exam and certain tests. Based on the results, you and your healthcare provider can then plan treatment. Until a diagnosis is made, the home care tips below can help relieve symptoms.  Home care    Do pelvic floor muscle exercises, if they are prescribed. The pelvic floor muscles help support the bladder and urethra. Many women find that their symptoms improve when doing special exercises that strengthen these muscles. To do the exercises contract the muscles you would use to stop your stream of urine, but do this when youre not urinating. Hold for 10 seconds, then relax. Repeat 10 to 20 times in a row, at least 3 times a day. Your provider may give you other instructions for how to do the exercises and how often.    Keep a bladder diary. This helps track how often and how much  you urinate over a set period of time. Bring this diary with you to your next visit with the provider. The information can help your provider learn more about your bladder problem.    Lose weight, if advised to by your provider. Excess weight puts pressure on the bladder. Your provider can help you create a weight-loss plan thats right for you. This may include exercising more and making certain diet changes.    Don't consume foods and drinks that may irritate the bladder. These can include alcohol and caffeinated drinks.    Quit smoking. Smoking and other tobacco use can lead to chronic cough that strains the pelvic floor muscles. Smoking may also damage the bladder and urethra. Talk with your provider about treatments or methods you can use to quit smoking.    If drinking large amounts of fluid causes you to have symptoms, you may be advised to limit your fluid intake. You may also be advised to drink most of your fluids during the day and to limit fluids at night.    If youre worried about urine leakage or accidents, you may wear absorbent pads to catch urine. Change the pads often. This helps reduce discomfort. It may also reduce the risk of skin or bladder infections.  Follow-up care  Follow up with your healthcare provider, or as directed. It may take some to find the right treatment for your problem. Your treatment plan may include special therapies or medicines. Certain procedures or surgery may also be options. Be sure to discuss any questions you have with your provider.  When to seek medical advice  Call the healthcare provider right away if any of these occur:    Fever of 100.4 F (38 C) or higher, or as directed by your provider    Bladder pain or fullness    Abdominal swelling    Nausea or vomiting    Back pain    Weakness, dizziness or fainting  Date Last Reviewed: 10/1/2017    9829-5766 [employer] [address] [place] [address]12. All rights reserved. This information is not  intended as a substitute for professional medical care. Always follow your healthcare professional's instructions.     Patient Education   Your Health Risk Assessment indicates you feel you are not in good emotional health.    Recreation   Recreation is not limited to sports and team events. It includes any activity that provides relaxation, interest, enjoyment, and exercise. Recreation provides an outlet for physical, mental, and social energy. It can give a sense of worth and achievement. It can help you stay healthy.    Mental Exercise and Social Involvement  Mental and emotional health is as important as physical health. Keep in touch with friends and family. Stay as active as possible. Continue to learn and challenge yourself.   Things you can do to stay mentally active are:    Learn something new, like a foreign language or musical instrument.     Play SCRABBLE or do crossword puzzles. If you cannot find people to play these games with you at home, you can play them with others on your computer through the Internet.     Join a games club--anything from card games to chess or checkers or lawn bowling.     Start a new hobby.     Go back to school.     Volunteer.     Read.     Keep up with world events.       Patient Education   Depression and Suicide in Older Adults  Nearly 2 million older Americans have some type of depression. Sadly, some of them even take their own lives. Yet depression among older adults is often ignored. Learn the warning signs. You may help spare a loved one needless pain. You may also save a life.       What Is Depression?  Depression is a mood disorder that affects the way you think and feel. The most common symptom is a feeling of deep sadness. People who are depressed also may seem tired and listless. And nothing seems to give them pleasure. Its normal to grieve or be sad sometimes. But sadness lessens or passes with time. Depression rarely goes away or improves on its own. Other  symptoms of depression are:    Sleeping more or less than normal    Eating more or less than normal    Having headaches, stomachaches, or other pains that dont go away    Feeling nervous, empty, or worthless    Crying a great deal    Thinking or talking about suicide or death    Feeling confused or forgetful  What Causes It?  The causes of depression arent fully known. Certain chemicals in the brain play a role. Depression does run in families. And life stresses can also trigger depression in some people. That may be the case with older adults. They often face great burdens, such as the death of friends or a spouse. They may have failing health. And they are more likely to be alone, lonely, or poor.  How You Can Help  Often, depressed people may not want to ask for help. When they do, they may be ignored. Or, they may receive the wrong treatment. You can help by showing parents and older friends love and support. If they seem depressed, help them find the right treatment. Talk to your doctor. Or contact a local mental health center, social service agency, or hospital. With modern treatment, no one has to suffer from depression.  Resources:    National [place] of Mental Health  994.847.1395  www.nimh.nih.gov    National [place] on Mental Illness  544.740.1572  www.aleena.org    [facility]  312.131.1708  www.[facility].org    National Suicide Hotline  987.654.6218 (800-SUICIDE)      8839-1456 [employer]. [address] [place] [address]. All rights reserved. This information is not intended as a substitute for professional medical care. Always follow your healthcare professional's instructions.           Advance Directive  Patients advance directive was discussed and I am comfortable with the patients wishes.  Patient Education   Personalized Prevention Plan  You are due for the preventive services outlined below.  Your care team is available to assist you in scheduling these services.   If you have already completed any of these items, please share that information with your care team to update in your medical record.  Health Maintenance   Topic Date Due   ? HEPATITIS C SCREENING  1960   ? HEART FAILURE ACTION PLAN  1961   ? DIABETIC FOOT EXAM  1961   ? MAMMOGRAM  1961   ? DIABETIC EYE EXAM  1961   ? Pneumococcal Vaccine: Pediatrics (0 to 5 Years) and At-Risk Patients (6 to 64 Years) (1 of 1 - PPSV23) 11/08/1967   ? HIV SCREENING  11/08/1976   ? TDAP ADULT ONE TIME DOSE  11/08/1979   ? ADVANCE CARE PLANNING  11/08/1979   ? COLORECTAL CANCER SCREENING  11/08/1979   ? HEPATITIS B VACCINES (1 of 3 - Risk 3-dose series) 11/08/1980   ? PAP SMEAR  11/08/1982   ? ZOSTER VACCINES (1 of 2) 11/08/2011   ? TD 18+ HE  08/26/2015   ? MICROALBUMIN  06/26/2020   ? BMP  10/23/2020   ? CBC  10/30/2020   ? LIPID  01/17/2021   ? A1C  04/12/2021   ? ALT  04/23/2021   ? MEDICARE ANNUAL WELLNESS VISIT  10/12/2021   ? TSH  Completed   ? INFLUENZA VACCINE RULE BASED  Completed

## 2021-08-06 NOTE — PROGRESS NOTES
"Progress Notes by Lakesha Mejia NP at 1/28/2021  2:50 PM     Author: Lakesha Mejia NP Service: -- Author Type: Nurse Practitioner    Filed: 1/28/2021  3:39 PM Encounter Date: 1/28/2021 Status: Signed    : Lakesha Mejia NP (Nurse Practitioner)           The patient has been notified of following:     \"This telephone visit will be conducted via a call between you and your physician/provider. We have found that certain health care needs can be provided without the need for a physical exam.  This service lets us provide the care you need with a phone conversation.  If a prescription is necessary we can send it directly to your pharmacy.  If lab work is needed we can place an order for that and you can then stop by our lab to have the test done at a later time. If during the course of the call the physician/provider feels a telephone visit is not appropriate, you will not be charged for this service.\" Verbal consent has been obtained for this service by care team member:         HEART CARE PHONE ENCOUNTER        The patient has chosen to have the visit conducted as a telephone visit, to reduce risk of exposure given the current status of Coronavirus in our community. This telephone visit is being conducted via a call between the patient and physician/provider. Health care needs are being provided without a physical exam.     Assessment/Recommendations   Assessment:    1.  Coronary artery disease with status post PCI/stent to left circumflex in October 2019: Denies chest pain.  On aspirin 81 mg daily and Brilinta 90 mg twice a day.    2. History of left ventricular systolic dysfunction: Most recent echo in January 2020 showed stable LVEF of 49% with no significant valve abnormalities noted.  Denies any heart failure symptoms.    Current weight: 189-196 lbs  Follows low-salt diet.    On lisinopril, metoprolol succinate, and furosemide.  Most recent BMP stable in October 2020.   She does not monitor her blood " pressure at home.    3. Dyslipidemia with LDL goal less than 70: On atorvastatin 80 mg daily.  Most recent LDL is 86 in October 2020.  Most recent AST/ALT are stable.  Zetia 10 mg was added in December by Dr. Mabry.  She is tolerating Zetia with no adverse effect.     Plan:  1.   Will discuss with Dr. Mabry if patient is to continue both aspirin and Brilinta.  2.  Continue current heart failure regimen.  Reinforced heart healthy and low-sodium diet.  3.  Patient was recommended stay active as tolerated.  4.  Encouraged to monitor blood pressure periodically at home.  5.  Recommended to check her fasting lipid and CMP level in 3 to 4 weeks .  She will call her PCP office to set up the appointment.  Lab orders are in the system.    Follow Up Plan: Follow up with Dr. Mabry in 2 months-she declined to follow-up in office.  I have reviewed the note as documented.  This accurately captures the substance of my conversation with the patient.    Total time of call between patient and provider was 11 minutes. 25  Minutes total time spent on the date of encounter doing chart review, review of test results, interpretation with above tests, patient visit, documentation and discussion with other provider.      Start Time:1449   Stop Time:1500     History of Present Illness/Subjective    Catina Acuna is a 60 y.o. female who is being evaluated via a billable telephone visit.    Catina Acuna has a significant past medical history of coronary artery disease with status post PCI to left circumflex in October 2019 with known  of RCA, mixed hyperlipidemia, ischemic cardiomyopathy with mild systolic dysfunction, chronic pain syndrome, diabetes type 2, tobacco abuse, and obstructive sleep apnea.    Patient had a follow-up with Dr. Mabry virtually in December.  She was initiated on Zetia for her cholesterol control.  She was recommended to follow-up with me in office.    Today, Catina reports that she has been stable from cardiac  standpoint since her last virtual follow-up with Dr. Mabry.  She is hesitant to come in office due to Covid pandemic situation.  She is having some headache which is chronic for her.  She does report chronic pain issue.  She denies chest pain, shortness of breath at rest, dyspnea exertion, PND, orthopnea, abdominal bloating or lower extremity edema.  She denies any bleeding complications.  She reports compliant with taking all her heart medications as prescribed.  She agreed to have her  fasting lipid check and liver and kidney function test done in the next 3 to 4 weeks.  She continues to decline to follow-up with us in the office.  She does not check her blood pressure at home.  She says she would consider to buy blood pressure cuff and monitor at home.  She is following low-sodium diet.  Her weight has been stable.    Echocardiogram on 1/25/2021-reviewed  Summary      Left ventricle ejection fraction is mildly decreased. The calculated left ventricular ejection fraction is 49%.    Normal left ventricular size. The calculated left ventricular ejection fraction is 49%. E/e' < 8, suggesting normal LV filling pressure. Left ventricular diastolic function is normal.    The following segments are hypokinetic: basal inferior, basal inferolateral, basal anterolateral, mid inferior and mid inferolateral. All other segments are normal.    Normal right ventricular size and systolic function.    No hemodynamically significant valvular heart abnormalities.    The ascending aorta is mildly dilated.    When compared to the previous study dated 10/30/2019, no significant change.          I have reviewed and updated the patient's Past Medical History, Social History, Family History and Medication List.     Physical Examination not performed given phone encounter Review of Systems         Medical History  Surgical History Family History Social History   Past Medical History:   Diagnosis Date   ? Depression    ? NSTEMI (non-ST  elevated myocardial infarction) (H) 10/30/2019    Past Surgical History:   Procedure Laterality Date   ? ANKLE FRACTURE SURGERY     ? CV CORONARY ANGIOGRAM N/A 10/31/2019    Procedure: Coronary Angiogram;  Surgeon: Chip Escalera MD;  Location: [facility];  Service: Cardiology    Family History   Problem Relation Age of Onset   ? Hypertension Mother    ? Heart failure Mother    ? Dementia Mother    ? Heart disease Mother    ? Hypertension Father    ? Heart disease Father    ? Diabetes Father    ? Emphysema Father    ? Heart disease Sister 58        4 vessel CBAG    Social History     Socioeconomic History   ? Marital status: Single     Spouse name: Not on file   ? Number of children: Not on file   ? Years of education: Not on file   ? Highest education level: Not on file   Occupational History   ? Not on file   Social Needs   ? Financial resource strain: Not on file   ? Food insecurity     Worry: Not on file     Inability: Not on file   ? Transportation needs     Medical: Not on file     Non-medical: Not on file   Tobacco Use   ? Smoking status: Former Smoker     Packs/day: 0.10     Years: 20.00     Pack years: 2.00     Types: Cigarettes   ? Smokeless tobacco: Never Used   Substance and Sexual Activity   ? Alcohol use: No     Comment: h/o alcohol abuse    ? Drug use: No   ? Sexual activity: Never     Comment: single   Lifestyle   ? Physical activity     Days per week: Not on file     Minutes per session: Not on file   ? Stress: Not on file   Relationships   ? Social connections     Talks on phone: Not on file     Gets together: Not on file     Attends Hindu service: Not on file     Active member of club or organization: Not on file     Attends meetings of clubs or organizations: Not on file     Relationship status: Not on file   ? Intimate partner violence     Fear of current or ex partner: Not on file     Emotionally abused: Not on file     Physically abused: Not on file     Forced sexual  activity: Not on file   Other Topics Concern   ? Not on file   Social History Narrative   ? Not on file          Medications  Allergies   Current Outpatient Medications   Medication Sig Dispense Refill   ? acetaminophen (TYLENOL) 500 MG tablet Take 500 mg by mouth as needed for pain.     ? aspirin 81 mg chewable tablet Chew 1 tablet (81 mg total) daily. 30 tablet 0   ? atorvastatin (LIPITOR) 80 MG tablet Take 1 tablet (80 mg total) by mouth at bedtime. 90 tablet 2   ? blood glucose test (CONTOUR NEXT TEST STRIPS) strips Use 1 each As Directed daily. Dispense brand per patient's insurance at pharmacy discretion. 100 strip 3   ? blood-glucose meter (CONTOUR NEXT METER) Misc Use 1 each As Directed daily. 1 each 0   ? ezetimibe (ZETIA) 10 mg tablet Take 1 tablet (10 mg total) by mouth daily. 90 tablet 3   ? furosemide (LASIX) 40 MG tablet TAKE ONE TABLET BY MOUTH EVERY DAY 90 tablet 0   ? losartan (COZAAR) 25 MG tablet Take 1 tablet (25 mg total) by mouth daily. 90 tablet 2   ? metFORMIN (GLUCOPHAGE XR) 500 MG 24 hr tablet Take 4 tablets (2,000 mg total) by mouth daily with supper. 360 tablet 3   ? metoprolol succinate (TOPROL-XL) 25 MG Take 0.5 tablets (12.5 mg total) by mouth at bedtime. 45 tablet 2   ? miscellaneous medical supply (BLOOD PRESSURE CUFF) [facility] Pt to take BP daily. Pt states needs average size cuff. 1 each 1   ? ONETOUCH DELICA PLUS LANCET 33 gauge Misc USE TO TEST BLOOD SUGAR ONCE DAILY 100 each 3   ? ticagrelor (BRILINTA) 90 mg Tab Take 1 tablet (90 mg total) by mouth 2 (two) times a day. For 12 months 180 tablet 2   ? ADVAIR DISKUS 250-50 mcg/dose DISKUS INHALE ONE PUFF BY MOUTH TWICE A DAY 60 each 3   ? nitroglycerin (NITROSTAT) 0.4 MG SL tablet Place 1 tablet (0.4 mg total) under the tongue every 5 (five) minutes as needed for chest pain. 25 tablet 1   ? senna (SENOKOT) 8.6 mg tablet Take 1 tablet by mouth daily as needed for constipation.       No current facility-administered medications for this  visit.     No Known Allergies      Lab Results    Chemistry/lipid CBC Cardiac Enzymes/BNP/TSH/INR   Lab Results   Component Value Date    CHOL 154 10/12/2020    HDL 38 (L) 10/12/2020    LDLCALC 86 10/12/2020    TRIG 151 (H) 10/12/2020    CREATININE 0.87 10/12/2020    BUN 20 10/12/2020    K 4.9 10/12/2020     10/12/2020     (H) 10/12/2020    CO2 22 10/12/2020    Lab Results   Component Value Date    WBC 8.9 10/30/2019    HGB 11.1 (L) 10/30/2019    HCT 33.3 (L) 10/30/2019    MCV 90 10/30/2019     10/30/2019    Lab Results   Component Value Date    TROPONINI 0.59 (HH) 11/01/2019    BNP 33 04/23/2020    TSH 2.76 10/12/2020        Lakesha Mejia

## 2021-09-01 NOTE — PROGRESS NOTES
HEART CARE ENCOUNTER CONSULTATON NOTE      [facility]  [phone]      Assessment/Recommendations   Assessment/Plan:  1.  Coronary artery disease.  Admitted October 2019 with acute coronary syndrome with shortness of breath and chest discomfort.  She has had no similar symptoms.  She tries to follow a prudent low salt diet.  She has not had any current issues with her medication regimen.  As outlined below we talked about the risks versus benefits of dual antiplatelet agents.  Based on the dual antiplatelet agent calculator she would potentially be a candidate for continued dual antiplatelet agents at 24 to 30 months.  She will continue with the current dose of Brilinta with approximately 3 months  left and watching for any bleeding and then update me at that time.    2.  Mild left ventricular dysfunction.  Echocardiogram completed June 2021 EF was said to be 45 to 50% with basilar and mid inferior hypokinesis and basilar posterior hypokinesis known occlusion of the right coronary artery.  I reviewed this with her in detail.  We talked about the angiographic findings and monitoring for symptoms.  She has not been tolerant of higher doses of metoprolol but does tolerate 12-1/2 mg.  Remains on 40 mg of furosemide with stable electrolytes BUN and creatinine from June 2021.  3.  Hypertension.  Blood pressure appears to be under good control today.  4.Dyslipidemia.  She has been on a combination of milligrams of atorvastatin and 10 mg of Zetia.  Recent lipids finds her LDL to be at goal or near goal at 71, liver function tests to be normal, total cholesterol 135.  This raise of 145.  We discussed ongoing prudent diet and exercise.    Patient to contact me when she has approximately 2 weeks left of Brilinta.  6 months or sooner if specific issues were to arise.     History of Present Illness/Subjective    HPI: Catina Acuna is a 60 year old female She presented with acute coronary syndrome  October 2019 and had intervention to an 80% circumflex stenosis with 100% mid RCA stenosis that was treated medically.  She has mild LV dysfunction.  She had some lightheadedness with a higher dose of metoprolol but since being on 12-1/2 mg lightheaded symptoms have resolved.  She denies chest discomfort.  She denies significant shortness of breath with her presenting symptoms being more profound shortness of breath.  She denies orthopnea or PND.  She is still taking Brilinta in combination with aspirin and that was 2 years out from her procedure.  I reviewed with her the dual antiplatelet agent calculator and discussed with interventional colleague.  She has about 3 months left of the Brilinta prescription I have asked her to contact me with any bleeding issues or when she has about 2 weeks left of the prescription so that we can discuss further.    Recent Echocardiogram Results:  Details    Reading Physician Reading Date Result Priority   Edgar Beavers MD  [phone] 6/4/2021 Routine   Provider, Historical 6/4/2021       Narrative & Impression  1. The left ventricle is normal in size. Left ventricular systolic performance is mildly reduced. The ejection fraction is estimated to be 45-50%.    2. There is severe basal and mid inferior hypokinesis.  There is severe basal posterior hypokinesis.  3. No significant valvular heart disease is identified on this study.   4. Normal right ventricular size and systolic performance.      When compared to the prior real-time echocardiogram dated 17 January 2020, there has been little appreciable interval change.  Specifically, it is this reader s impression that left ventricular systolic performance and regional wall motion appears   similar on both studies.       Recent Coronary Angiogram Results:  Catina Acuna  Coronary Angiogram  Order# [id number]  Reading physician: Chip Escalera MD Ordering physician: Eriberto Croft MD Study date: 10/31/2019   Patient  Information    Name MRN Description   Catina Acuna [id number] 58 year old female   Indications    Non-STEMI (non-ST elevated myocardial infarction) (H) [I21.4 (ICD-10-CM)]       Conclusion      Ost RCA to Mid RCA lesion is 35% stenosed.    Mid RCA lesion is 100% stenosed.    Mid Cx to Dist Cx lesion is 80% stenosed.    Pressure wire/FFR was performed on the LCX lesion. pre diagnositic: 0.4. post diagnostic: 0.4.    A drug eluting stent was successfully placed into LCX.            Physical Examination  Review of Systems   Vitals: There were no vitals taken for this visit.  BMI= There is no height or weight on file to calculate BMI.  Wt Readings from Last 3 Encounters:   10/12/20 85.3 kg (188 lb)   04/21/20 87.1 kg (192 lb)   01/06/20 92.8 kg (204 lb 8 oz)       General Appearance:   no distress, normal body habitus   ENT/Mouth: membranes moist, no oral lesions or bleeding gums.      EYES:  no scleral icterus, normal conjunctivae   Neck: no carotid bruits or thyromegaly   Chest/Lungs:   lungs are diminished, occasional wheeze  , , equal chest wall expansion    Cardiovascular:   Regular. Normal first and second heart sounds with systolic murmur, S4; the carotid, radial and posterior tibial pulses are intact, Jugular venous pressure not obviously increased although mildly challenging exam, no edema bilaterally    Abdomen:  no organomegaly, masses, bruits, or tenderness; bowel sounds are present   Extremities: no cyanosis or clubbing   Skin: no xanthelasma, warm.    Neurologic: normal  bilateral, no tremors     Psychiatric: alert and oriented x3, calm        Please refer above for cardiac ROS details.        Medical History  Surgical History Family History Social History   Past Medical History:   Diagnosis Date     Depression      NSTEMI (non-ST elevated myocardial infarction) (H) 10/30/2019     Past Surgical History:   Procedure Laterality Date     ANKLE FRACTURE SURGERY       CV CORONARY ANGIOGRAM N/A 10/31/2019     Procedure: Coronary Angiogram;  Surgeon: Chip Escalera MD;  Location: [facility];  Service: Cardiology     Family History   Problem Relation Age of Onset     Hypertension Mother      Heart Failure Mother      Dementia Mother      Heart Disease Mother      Hypertension Father      Heart Disease Father      Diabetes Father      Emphysema Father      Heart Disease Sister 58.00        4 vessel CBAG        Social History     Socioeconomic History     Marital status: Single     Spouse name: Not on file     Number of children: Not on file     Years of education: Not on file     Highest education level: Not on file   Occupational History     Not on file   Tobacco Use     Smoking status: Former Smoker     Packs/day: 0.10     Years: 20.00     Pack years: 2.00     Types: Cigarettes     Smokeless tobacco: Never Used   Substance and Sexual Activity     Alcohol use: No     Comment: Alcoholic Drinks/day: h/o alcohol abuse      Drug use: No     Sexual activity: Never     Comment: single   Other Topics Concern     Not on file   Social History Narrative     Not on file     Social Determinants of Health     Financial Resource Strain:      Difficulty of Paying Living Expenses:    Food Insecurity:      Worried About Running Out of Food in the Last Year:      Ran Out of Food in the Last Year:    Transportation Needs:      Lack of Transportation (Medical):      Lack of Transportation (Non-Medical):    Physical Activity:      Days of Exercise per Week:      Minutes of Exercise per Session:    Stress:      Feeling of Stress :    Social Connections:      Frequency of Communication with Friends and Family:      Frequency of Social Gatherings with Friends and Family:      Attends Confucianism Services:      Active Member of Clubs or Organizations:      Attends Club or Organization Meetings:      Marital Status:    Intimate Partner Violence:      Fear of Current or Ex-Partner:      Emotionally Abused:      Physically Abused:       Sexually Abused:            Medications  Allergies   Current Outpatient Medications   Medication Sig Dispense Refill     acetaminophen (TYLENOL) 500 MG tablet [ACETAMINOPHEN (TYLENOL) 500 MG TABLET] Take 500 mg by mouth as needed for pain.       ADVAIR DISKUS 250-50 mcg/dose DISKUS [ADVAIR DISKUS 250-50 MCG/DOSE DISKUS] INHALE ONE PUFF BY MOUTH TWICE A DAY 60 each 3     aspirin 81 mg chewable tablet [ASPIRIN 81 MG CHEWABLE TABLET] Chew 1 tablet (81 mg total) daily. 30 tablet 0     atorvastatin (LIPITOR) 80 MG tablet [ATORVASTATIN (LIPITOR) 80 MG TABLET] TAKE ONE TABLET BY MOUTH AT BEDTIME 90 tablet 2     blood glucose test (CONTOUR NEXT TEST STRIPS) strips [BLOOD GLUCOSE TEST (CONTOUR NEXT TEST STRIPS) STRIPS] Use 1 each As Directed daily. Dispense brand per patient's insurance at pharmacy discretion. 100 strip 3     blood-glucose meter (CONTOUR NEXT METER) Misc [BLOOD-GLUCOSE METER (CONTOUR NEXT METER) MISC] Use 1 each As Directed daily. 1 each 0     BRILINTA 90 mg Tab [BRILINTA 90 MG TAB] TAKE ONE TABLET BY MOUTH TWICE A  tablet 2     ezetimibe (ZETIA) 10 mg tablet [EZETIMIBE (ZETIA) 10 MG TABLET] Take 1 tablet (10 mg total) by mouth daily. 90 tablet 3     furosemide (LASIX) 40 MG tablet [FUROSEMIDE (LASIX) 40 MG TABLET] Take 1 tablet (40 mg total) by mouth daily. 90 tablet 1     losartan (COZAAR) 25 MG tablet [LOSARTAN (COZAAR) 25 MG TABLET] Take 1 tablet (25 mg total) by mouth daily. 90 tablet 2     metFORMIN (GLUCOPHAGE-XR) 500 MG 24 hr tablet Take 4 tablets (2,000 mg) by mouth daily (with dinner) 360 tablet 1     metoprolol succinate (TOPROL-XL) 25 MG [METOPROLOL SUCCINATE (TOPROL-XL) 25 MG] Take 0.5 tablets (12.5 mg total) by mouth at bedtime. 45 tablet 1     miscellaneous medical supply (BLOOD PRESSURE CUFF) Misc [MISCELLANEOUS MEDICAL SUPPLY (BLOOD PRESSURE CUFF) MISC] Pt to take BP daily. Pt states needs average size cuff. 1 each 1     nitroglycerin (NITROSTAT) 0.4 MG SL tablet [NITROGLYCERIN  (NITROSTAT) 0.4 MG SL TABLET] Place 1 tablet (0.4 mg total) under the tongue every 5 (five) minutes as needed for chest pain. 25 tablet 1     ONETOUCH DELICA PLUS LANCET 33 gauge Misc [ONETOUCH DELICA PLUS LANCET 33 GAUGE MISC] USE TO TEST BLOOD SUGAR ONCE DAILY 100 each 3     senna (SENOKOT) 8.6 mg tablet [SENNA (SENOKOT) 8.6 MG TABLET] Take 1 tablet by mouth daily as needed for constipation.       No Known Allergies       Lab Results    Chemistry/lipid CBC Cardiac Enzymes/BNP/TSH/INR   Recent Labs   Lab Test 06/04/21  1100   CHOL 135   HDL 35*   LDL 71   TRIG 145     Recent Labs   Lab Test 06/04/21  1100 10/12/20  0928 01/17/20  0852   LDL 71 86 75     Recent Labs   Lab Test 06/04/21  1100      POTASSIUM 3.7   CHLORIDE 104   CO2 25   *   BUN 18   CR 0.95   GFRESTIMATED 60*   DARLYN 9.6     Recent Labs   Lab Test 06/04/21  1100 10/12/20  0928 04/23/20  1446   CR 0.95 0.87 0.82     Recent Labs   Lab Test 10/12/20  0928 04/23/20  1446 11/01/19  0558   A1C 7.3* 7.5* 8.5*          Recent Labs   Lab Test 10/30/19  0814   WBC 8.9   HGB 11.1*   HCT 33.3*   MCV 90        Recent Labs   Lab Test 10/30/19  0814   HGB 11.1*    Recent Labs   Lab Test 11/01/19  0558 10/30/19  1159 10/30/19  1156   TROPONINI 0.59* 0.84* 0.86*     Recent Labs   Lab Test 04/23/20  1446 10/30/19  0814   BNP 33 327*     Recent Labs   Lab Test 10/12/20  0950   TSH 2.76     No results for input(s): INR in the last 71430 hours.     Roberto Mabry MD

## 2021-09-02 ENCOUNTER — OFFICE VISIT (OUTPATIENT)
Dept: CARDIOLOGY | Facility: CLINIC | Age: 61
End: 2021-09-02
Payer: COMMERCIAL

## 2021-09-02 VITALS
RESPIRATION RATE: 18 BRPM | BODY MASS INDEX: 33.12 KG/M2 | SYSTOLIC BLOOD PRESSURE: 118 MMHG | HEART RATE: 72 BPM | WEIGHT: 199 LBS | DIASTOLIC BLOOD PRESSURE: 68 MMHG

## 2021-09-02 DIAGNOSIS — I25.10 CORONARY ARTERY DISEASE INVOLVING NATIVE CORONARY ARTERY OF NATIVE HEART WITHOUT ANGINA PECTORIS: ICD-10-CM

## 2021-09-02 DIAGNOSIS — E78.2 MIXED HYPERLIPIDEMIA: Primary | ICD-10-CM

## 2021-09-02 DIAGNOSIS — I51.89 MILD LEFT VENTRICULAR SYSTOLIC DYSFUNCTION: ICD-10-CM

## 2021-09-02 DIAGNOSIS — I25.5 ISCHEMIC CARDIOMYOPATHY: ICD-10-CM

## 2021-09-02 PROCEDURE — 99214 OFFICE O/P EST MOD 30 MIN: CPT | Performed by: INTERNAL MEDICINE

## 2021-09-02 NOTE — LETTER
9/2/2021    Lucretia Raya MD  [address]  [facility] [address]    RE: Catina BLOOM Armand       Dear Colleague,    I had the pleasure of seeing Catina Acuna in the [facility].      HEART CARE ENCOUNTER CONSULTATON NOTE      [facility]  [phone]      Assessment/Recommendations   Assessment/Plan:  1.  Coronary artery disease.  Admitted October 2019 with acute coronary syndrome with shortness of breath and chest discomfort.  She has had no similar symptoms.  She tries to follow a prudent low salt diet.  She has not had any current issues with her medication regimen.  As outlined below we talked about the risks versus benefits of dual antiplatelet agents.  Based on the dual antiplatelet agent calculator she would potentially be a candidate for continued dual antiplatelet agents at 24 to 30 months.  She will continue with the current dose of Brilinta with approximately 3 months  left and watching for any bleeding and then update me at that time.    2.  Mild left ventricular dysfunction.  Echocardiogram completed June 2021 EF was said to be 45 to 50% with basilar and mid inferior hypokinesis and basilar posterior hypokinesis known occlusion of the right coronary artery.  I reviewed this with her in detail.  We talked about the angiographic findings and monitoring for symptoms.  She has not been tolerant of higher doses of metoprolol but does tolerate 12-1/2 mg.  Remains on 40 mg of furosemide with stable electrolytes BUN and creatinine from June 2021.  3.  Hypertension.  Blood pressure appears to be under good control today.  4.Dyslipidemia.  She has been on a combination of milligrams of atorvastatin and 10 mg of Zetia.  Recent lipids finds her LDL to be at goal or near goal at 71, liver function tests to be normal, total cholesterol 135.  This raise of 145.  We discussed ongoing prudent diet and exercise.    Patient to contact me  when she has approximately 2 weeks left of Brilinta.  6 months or sooner if specific issues were to arise.     History of Present Illness/Subjective    HPI: Catina Acuna is a 60 year old female She presented with acute coronary syndrome October 2019 and had intervention to an 80% circumflex stenosis with 100% mid RCA stenosis that was treated medically.  She has mild LV dysfunction.  She had some lightheadedness with a higher dose of metoprolol but since being on 12-1/2 mg lightheaded symptoms have resolved.  She denies chest discomfort.  She denies significant shortness of breath with her presenting symptoms being more profound shortness of breath.  She denies orthopnea or PND.  She is still taking Brilinta in combination with aspirin and that was 2 years out from her procedure.  I reviewed with her the dual antiplatelet agent calculator and discussed with interventional colleague.  She has about 3 months left of the Brilinta prescription I have asked her to contact me with any bleeding issues or when she has about 2 weeks left of the prescription so that we can discuss further.    Recent Echocardiogram Results:  Details    Reading Physician Reading Date Result Priority   Edgar Beavers MD  [phone] 6/4/2021 Routine   Provider, Historical 6/4/2021       Narrative & Impression  1. The left ventricle is normal in size. Left ventricular systolic performance is mildly reduced. The ejection fraction is estimated to be 45-50%.    2. There is severe basal and mid inferior hypokinesis.  There is severe basal posterior hypokinesis.  3. No significant valvular heart disease is identified on this study.   4. Normal right ventricular size and systolic performance.      When compared to the prior real-time echocardiogram dated 17 January 2020, there has been little appreciable interval change.  Specifically, it is this reader s impression that left ventricular systolic performance and regional wall motion  appears   similar on both studies.       Recent Coronary Angiogram Results:  Catina Acuna  Coronary Angiogram  Order# [id number]  Reading physician: Chip Escalera MD Ordering physician: Eriberto Croft MD Study date: 10/31/2019   Patient Information    Name MRN Description   Catina Acuna [id number] 58 year old female   Indications    Non-STEMI (non-ST elevated myocardial infarction) (H) [I21.4 (ICD-10-CM)]       Conclusion      Ost RCA to Mid RCA lesion is 35% stenosed.    Mid RCA lesion is 100% stenosed.    Mid Cx to Dist Cx lesion is 80% stenosed.    Pressure wire/FFR was performed on the LCX lesion. pre diagnositic: 0.4. post diagnostic: 0.4.    A drug eluting stent was successfully placed into LCX.            Physical Examination  Review of Systems   Vitals: There were no vitals taken for this visit.  BMI= There is no height or weight on file to calculate BMI.  Wt Readings from Last 3 Encounters:   10/12/20 85.3 kg (188 lb)   04/21/20 87.1 kg (192 lb)   01/06/20 92.8 kg (204 lb 8 oz)       General Appearance:   no distress, normal body habitus   ENT/Mouth: membranes moist, no oral lesions or bleeding gums.      EYES:  no scleral icterus, normal conjunctivae   Neck: no carotid bruits or thyromegaly   Chest/Lungs:   lungs are diminished, occasional wheeze  , , equal chest wall expansion    Cardiovascular:   Regular. Normal first and second heart sounds with systolic murmur, S4; the carotid, radial and posterior tibial pulses are intact, Jugular venous pressure not obviously increased although mildly challenging exam, no edema bilaterally    Abdomen:  no organomegaly, masses, bruits, or tenderness; bowel sounds are present   Extremities: no cyanosis or clubbing   Skin: no xanthelasma, warm.    Neurologic: normal  bilateral, no tremors     Psychiatric: alert and oriented x3, calm        Please refer above for cardiac ROS details.        Medical History  Surgical History Family History Social History    Past Medical History:   Diagnosis Date     Depression      NSTEMI (non-ST elevated myocardial infarction) (H) 10/30/2019     Past Surgical History:   Procedure Laterality Date     ANKLE FRACTURE SURGERY       CV CORONARY ANGIOGRAM N/A 10/31/2019    Procedure: Coronary Angiogram;  Surgeon: Chip Escalera MD;  Location: [facility];  Service: Cardiology     Family History   Problem Relation Age of Onset     Hypertension Mother      Heart Failure Mother      Dementia Mother      Heart Disease Mother      Hypertension Father      Heart Disease Father      Diabetes Father      Emphysema Father      Heart Disease Sister 58.00        4 vessel CBAG        Social History     Socioeconomic History     Marital status: Single     Spouse name: Not on file     Number of children: Not on file     Years of education: Not on file     Highest education level: Not on file   Occupational History     Not on file   Tobacco Use     Smoking status: Former Smoker     Packs/day: 0.10     Years: 20.00     Pack years: 2.00     Types: Cigarettes     Smokeless tobacco: Never Used   Substance and Sexual Activity     Alcohol use: No     Comment: Alcoholic Drinks/day: h/o alcohol abuse      Drug use: No     Sexual activity: Never     Comment: single   Other Topics Concern     Not on file   Social History Narrative     Not on file     Social Determinants of Health     Financial Resource Strain:      Difficulty of Paying Living Expenses:    Food Insecurity:      Worried About Running Out of Food in the Last Year:      Ran Out of Food in the Last Year:    Transportation Needs:      Lack of Transportation (Medical):      Lack of Transportation (Non-Medical):    Physical Activity:      Days of Exercise per Week:      Minutes of Exercise per Session:    Stress:      Feeling of Stress :    Social Connections:      Frequency of Communication with Friends and Family:      Frequency of Social Gatherings with Friends and Family:      Attends  Mandaen Services:      Active Member of Clubs or Organizations:      Attends Club or Organization Meetings:      Marital Status:    Intimate Partner Violence:      Fear of Current or Ex-Partner:      Emotionally Abused:      Physically Abused:      Sexually Abused:            Medications  Allergies   Current Outpatient Medications   Medication Sig Dispense Refill     acetaminophen (TYLENOL) 500 MG tablet [ACETAMINOPHEN (TYLENOL) 500 MG TABLET] Take 500 mg by mouth as needed for pain.       ADVAIR DISKUS 250-50 mcg/dose DISKUS [ADVAIR DISKUS 250-50 MCG/DOSE DISKUS] INHALE ONE PUFF BY MOUTH TWICE A DAY 60 each 3     aspirin 81 mg chewable tablet [ASPIRIN 81 MG CHEWABLE TABLET] Chew 1 tablet (81 mg total) daily. 30 tablet 0     atorvastatin (LIPITOR) 80 MG tablet [ATORVASTATIN (LIPITOR) 80 MG TABLET] TAKE ONE TABLET BY MOUTH AT BEDTIME 90 tablet 2     blood glucose test (CONTOUR NEXT TEST STRIPS) strips [BLOOD GLUCOSE TEST (CONTOUR NEXT TEST STRIPS) STRIPS] Use 1 each As Directed daily. Dispense brand per patient's insurance at pharmacy discretion. 100 strip 3     blood-glucose meter (CONTOUR NEXT METER) Misc [BLOOD-GLUCOSE METER (CONTOUR NEXT METER) MISC] Use 1 each As Directed daily. 1 each 0     BRILINTA 90 mg Tab [BRILINTA 90 MG TAB] TAKE ONE TABLET BY MOUTH TWICE A  tablet 2     ezetimibe (ZETIA) 10 mg tablet [EZETIMIBE (ZETIA) 10 MG TABLET] Take 1 tablet (10 mg total) by mouth daily. 90 tablet 3     furosemide (LASIX) 40 MG tablet [FUROSEMIDE (LASIX) 40 MG TABLET] Take 1 tablet (40 mg total) by mouth daily. 90 tablet 1     losartan (COZAAR) 25 MG tablet [LOSARTAN (COZAAR) 25 MG TABLET] Take 1 tablet (25 mg total) by mouth daily. 90 tablet 2     metFORMIN (GLUCOPHAGE-XR) 500 MG 24 hr tablet Take 4 tablets (2,000 mg) by mouth daily (with dinner) 360 tablet 1     metoprolol succinate (TOPROL-XL) 25 MG [METOPROLOL SUCCINATE (TOPROL-XL) 25 MG] Take 0.5 tablets (12.5 mg total) by mouth at bedtime. 45 tablet 1      miscellaneous medical supply (BLOOD PRESSURE CUFF) Misc [MISCELLANEOUS MEDICAL SUPPLY (BLOOD PRESSURE CUFF) MISC] Pt to take BP daily. Pt states needs average size cuff. 1 each 1     nitroglycerin (NITROSTAT) 0.4 MG SL tablet [NITROGLYCERIN (NITROSTAT) 0.4 MG SL TABLET] Place 1 tablet (0.4 mg total) under the tongue every 5 (five) minutes as needed for chest pain. 25 tablet 1     ONETOUCH DELICA PLUS LANCET 33 gauge Misc [ONETOUCH DELICA PLUS LANCET 33 GAUGE MISC] USE TO TEST BLOOD SUGAR ONCE DAILY 100 each 3     senna (SENOKOT) 8.6 mg tablet [SENNA (SENOKOT) 8.6 MG TABLET] Take 1 tablet by mouth daily as needed for constipation.       No Known Allergies       Lab Results    Chemistry/lipid CBC Cardiac Enzymes/BNP/TSH/INR   Recent Labs   Lab Test 06/04/21  1100   CHOL 135   HDL 35*   LDL 71   TRIG 145     Recent Labs   Lab Test 06/04/21  1100 10/12/20  0928 01/17/20  0852   LDL 71 86 75     Recent Labs   Lab Test 06/04/21  1100      POTASSIUM 3.7   CHLORIDE 104   CO2 25   *   BUN 18   CR 0.95   GFRESTIMATED 60*   DARLYN 9.6     Recent Labs   Lab Test 06/04/21  1100 10/12/20  0928 04/23/20  1446   CR 0.95 0.87 0.82     Recent Labs   Lab Test 10/12/20  0928 04/23/20  1446 11/01/19  0558   A1C 7.3* 7.5* 8.5*          Recent Labs   Lab Test 10/30/19  0814   WBC 8.9   HGB 11.1*   HCT 33.3*   MCV 90        Recent Labs   Lab Test 10/30/19  0814   HGB 11.1*    Recent Labs   Lab Test 11/01/19  0558 10/30/19  1159 10/30/19  1156   TROPONINI 0.59* 0.84* 0.86*     Recent Labs   Lab Test 04/23/20  1446 10/30/19  0814   BNP 33 327*     Recent Labs   Lab Test 10/12/20  0950   TSH 2.76     No results for input(s): INR in the last 81419 hours.     Roberto Mabry MD                                        Thank you for allowing me to participate in the care of your patient.      Sincerely,     Roberto Mabry MD     [facility]  cc:   No referring provider  defined for this encounter.

## 2021-09-02 NOTE — PATIENT INSTRUCTIONS
Please let me know when you are 2 weeks away from finishing the current bottle of Brilinta either on my chart or calling Sherie.Call with increased shortness of breath, bleeding issues or any other concerns.Please do not  another refill of Brilinta,Her number is [phone]

## 2021-09-25 ENCOUNTER — HEALTH MAINTENANCE LETTER (OUTPATIENT)
Age: 61
End: 2021-09-25

## 2021-09-30 ENCOUNTER — TELEPHONE (OUTPATIENT)
Dept: CARDIOLOGY | Facility: CLINIC | Age: 61
End: 2021-09-30

## 2021-09-30 DIAGNOSIS — I21.4 NON-STEMI (NON-ST ELEVATED MYOCARDIAL INFARCTION) (H): ICD-10-CM

## 2021-09-30 RX ORDER — NITROGLYCERIN 0.4 MG/1
0.4 TABLET SUBLINGUAL EVERY 5 MIN PRN
Qty: 25 TABLET | Refills: 1 | Status: SHIPPED | OUTPATIENT
Start: 2021-09-30 | End: 2021-10-04

## 2021-09-30 NOTE — TELEPHONE ENCOUNTER
It is really hard to say over the phone with just limited symptoms.  My advice would be that if she is having increased shortness of breath that she have that evaluated.  It sounds to me that urgent care would be acceptable.  Please have her update us with what she ends up doing.  Sounds like she might be under a lot of stress based on the report that her son is in the ICU.  We can also have her follow-up here with Lakesha if available  Roberto Mabry MD

## 2021-09-30 NOTE — TELEPHONE ENCOUNTER
----- Message from Amie RESENDEZ Rowe sent at 9/30/2021  2:08 PM CDT -----  Regarding: clotilde orosco  General phone call:    Caller: Catina  Primary cardiologist: Clotilde  Detailed reason for call: pt states when laying on the left side pt feels discomfort like labor breathing, is it something to be concern about. Please advise   Best phone number: [phone]  Best time to contact: any  Ok to leave a detailedmessage? yes  Device? No      Additional Info:

## 2021-09-30 NOTE — TELEPHONE ENCOUNTER
Spoke with pt and she has noted in last 3 days has had increased SOB but only when lying on left side. She has done more walking in last week due to son in ICU and having to go from  parking lot to room. Also was carrying a heavy bag most of the time. Has had a lot of stress. Denies fever or body aches. She does has a pulse ox and it has been greater than 92%.  Will update Dr. Mabry.

## 2021-09-30 NOTE — TELEPHONE ENCOUNTER
Pt agrees with plan. Will go to Urgent care if things don't get better or will back for Chime appointment.

## 2021-10-04 DIAGNOSIS — I21.4 NON-STEMI (NON-ST ELEVATED MYOCARDIAL INFARCTION) (H): ICD-10-CM

## 2021-10-04 RX ORDER — NITROGLYCERIN 0.4 MG/1
0.4 TABLET SUBLINGUAL EVERY 5 MIN PRN
Qty: 25 TABLET | Refills: 4 | Status: SHIPPED | OUTPATIENT
Start: 2021-10-04 | End: 2023-07-06

## 2021-11-09 DIAGNOSIS — E11.8 TYPE 2 DIABETES MELLITUS WITH COMPLICATION, WITHOUT LONG-TERM CURRENT USE OF INSULIN (H): Primary | ICD-10-CM

## 2021-11-09 DIAGNOSIS — I25.5 ISCHEMIC CARDIOMYOPATHY: ICD-10-CM

## 2021-11-09 DIAGNOSIS — I21.4 NON-STEMI (NON-ST ELEVATED MYOCARDIAL INFARCTION) (H): ICD-10-CM

## 2021-11-09 RX ORDER — FUROSEMIDE 40 MG
40 TABLET ORAL DAILY
Qty: 90 TABLET | Refills: 1 | Status: SHIPPED | OUTPATIENT
Start: 2021-11-09 | End: 2022-05-10

## 2021-11-09 RX ORDER — METOPROLOL SUCCINATE 25 MG/1
12.5 TABLET, EXTENDED RELEASE ORAL AT BEDTIME
Qty: 45 TABLET | Refills: 1 | Status: CANCELLED | OUTPATIENT
Start: 2021-11-09

## 2021-11-10 NOTE — TELEPHONE ENCOUNTER
"Routing refill request to provider for review/approval because:  Patient needs to be seen because it has been more than 1 year since last office visit.  Last office visit 10/12/2020    Last Written Prescription Date:  11/9/2020 Lancets  Last Fill Quantity: 100,  # refills: 3   Last office visit provider: 10/12/2020      Last Written Prescription Date:  5/12/21Metoprolol  Last Fill Quantity: 45,  # refills: 1       Requested Prescriptions   Pending Prescriptions Disp Refills     Lancets (ONETOUCH DELICA PLUS [id number]) MISC [Pharmacy Med Name: ONETOUCH DELICA PLUS LANCET 33G] 100 each 3     Sig: USE TO TEST BLOOD SUGAR ONCE A DAY       Diabetic Supplies Protocol Failed - 11/9/2021 10:05 AM        Failed - Medication is active on med list        Failed - Recent (6 mo) or future (30 days) visit within the authorizing provider's specialty     Patient had office visit in the last 6 months or has a visit in the next 30 days with authorizing provider.  See \"Patient Info\" tab in inbasket, or \"Choose Columns\" in Meds & Orders section of the refill encounter.            Passed - Patient is 18 years of age or older           metoprolol succinate ER (TOPROL-XL) 25 MG 24 hr tablet 45 tablet 1     Sig: Take 0.5 tablets (12.5 mg) by mouth At Bedtime       Beta-Blockers Protocol Failed - 11/9/2021 10:05 AM        Failed - Recent (12 mo) or future (30 days) visit within the authorizing provider's specialty     Patient has had an office visit with the authorizing provider or a provider within the authorizing providers department within the previous 12 mos or has a future within next 30 days. See \"Patient Info\" tab in inbasket, or \"Choose Columns\" in Meds & Orders section of the refill encounter.              Passed - Blood pressure under 140/90 in past 12 months     BP Readings from Last 3 Encounters:   09/02/21 118/68   10/12/20 100/58   01/06/20 102/60                 Passed - Patient is age 6 or older        Passed - Medication is " active on med list             Alma Rosa Bhat RN 11/10/21 12:07 PM

## 2021-11-11 DIAGNOSIS — I25.5 ISCHEMIC CARDIOMYOPATHY: ICD-10-CM

## 2021-11-11 DIAGNOSIS — I21.4 NON-STEMI (NON-ST ELEVATED MYOCARDIAL INFARCTION) (H): ICD-10-CM

## 2021-11-11 RX ORDER — METOPROLOL SUCCINATE 25 MG/1
12.5 TABLET, EXTENDED RELEASE ORAL AT BEDTIME
Qty: 45 TABLET | Refills: 3 | Status: SHIPPED | OUTPATIENT
Start: 2021-11-11 | End: 2022-10-31

## 2021-11-13 RX ORDER — LANCETS 33 GAUGE
EACH MISCELLANEOUS
Qty: 100 EACH | Refills: 3 | Status: SHIPPED | OUTPATIENT
Start: 2021-11-13

## 2021-11-20 ENCOUNTER — HEALTH MAINTENANCE LETTER (OUTPATIENT)
Age: 61
End: 2021-11-20

## 2022-01-03 DIAGNOSIS — I25.5 ISCHEMIC CARDIOMYOPATHY: ICD-10-CM

## 2022-01-03 DIAGNOSIS — E11.8 TYPE 2 DIABETES MELLITUS WITH COMPLICATION, WITHOUT LONG-TERM CURRENT USE OF INSULIN (H): ICD-10-CM

## 2022-01-06 ENCOUNTER — MYC REFILL (OUTPATIENT)
Dept: FAMILY MEDICINE | Facility: CLINIC | Age: 62
End: 2022-01-06
Payer: COMMERCIAL

## 2022-01-06 DIAGNOSIS — I25.5 ISCHEMIC CARDIOMYOPATHY: ICD-10-CM

## 2022-01-06 DIAGNOSIS — E11.8 TYPE 2 DIABETES MELLITUS WITH COMPLICATION, WITHOUT LONG-TERM CURRENT USE OF INSULIN (H): ICD-10-CM

## 2022-01-06 NOTE — TELEPHONE ENCOUNTER
"Routing refill request to provider for review/approval because:  A break in medication  Patient needs to be seen because it has been more than 1 year since last office visit.    Last Written Prescription Date:  1/12/21  Last Fill Quantity: 90,  # refills: 2   Last office visit provider: 10/12/2020       Requested Prescriptions   Pending Prescriptions Disp Refills     losartan (COZAAR) 25 MG tablet 90 tablet 2     Sig: Take 1 tablet (25 mg) by mouth daily       Angiotensin-II Receptors Failed - 1/3/2022  3:15 PM        Failed - Recent (12 mo) or future (30 days) visit within the authorizing provider's specialty     Patient has had an office visit with the authorizing provider or a provider within the authorizing providers department within the previous 12 mos or has a future within next 30 days. See \"Patient Info\" tab in inbasket, or \"Choose Columns\" in Meds & Orders section of the refill encounter.              Passed - Last blood pressure under 140/90 in past 12 months     BP Readings from Last 3 Encounters:   09/02/21 118/68   10/12/20 100/58   01/06/20 102/60                 Passed - Medication is active on med list        Passed - Patient is age 18 or older        Passed - No active pregnancy on record        Passed - Normal serum creatinine on file in past 12 months     Recent Labs   Lab Test 06/04/21  1100   CR 0.95       Ok to refill medication if creatinine is low          Passed - Normal serum potassium on file in past 12 months     Recent Labs   Lab Test 06/04/21  1100   POTASSIUM 3.7                    Passed - No positive pregnancy test in past 12 months             Alma Rosa Bhat RN 01/06/22 1:52 PM  "

## 2022-01-07 RX ORDER — LOSARTAN POTASSIUM 25 MG/1
25 TABLET ORAL DAILY
Qty: 90 TABLET | Refills: 2 | Status: SHIPPED | OUTPATIENT
Start: 2022-01-07 | End: 2022-10-17

## 2022-01-09 RX ORDER — LOSARTAN POTASSIUM 25 MG/1
25 TABLET ORAL DAILY
Qty: 90 TABLET | Refills: 2 | OUTPATIENT
Start: 2022-01-09

## 2022-01-15 ENCOUNTER — HEALTH MAINTENANCE LETTER (OUTPATIENT)
Age: 62
End: 2022-01-15

## 2022-02-09 DIAGNOSIS — I21.4 NON-STEMI (NON-ST ELEVATED MYOCARDIAL INFARCTION) (H): ICD-10-CM

## 2022-02-09 DIAGNOSIS — E11.9 TYPE 2 DIABETES MELLITUS (H): ICD-10-CM

## 2022-02-09 RX ORDER — ATORVASTATIN CALCIUM 80 MG/1
TABLET, FILM COATED ORAL
Qty: 90 TABLET | Refills: 2 | Status: SHIPPED | OUTPATIENT
Start: 2022-02-09 | End: 2022-10-31

## 2022-03-09 NOTE — TELEPHONE ENCOUNTER
"Routing refill request to provider for review/approval because:  Labs not current:  A1C  Patient needs to be seen because it has been more than 1 year since last office visit.    Last Written Prescription Date:  7/31/21  Last Fill Quantity: 360,  # refills: 1   Last office visit provider:  10/12/20     Requested Prescriptions   Pending Prescriptions Disp Refills     metFORMIN (GLUCOPHAGE-XR) 500 MG 24 hr tablet 360 tablet 1     Sig: Take 4 tablets (2,000 mg) by mouth daily (with dinner)       Biguanide Agents Failed - 3/9/2022  2:59 PM        Failed - Patient has documented A1c within the specified period of time.     If HgbA1C is 8 or greater, it needs to be on file within the past 3 months.  If less than 8, must be on file within the past 6 months.     Recent Labs   Lab Test 10/12/20  0928   A1C 7.3*             Failed - Recent (6 mo) or future (30 days) visit within the authorizing provider's specialty     Patient had office visit in the last 6 months or has a visit in the next 30 days with authorizing provider or within the authorizing provider's specialty.  See \"Patient Info\" tab in inbasket, or \"Choose Columns\" in Meds & Orders section of the refill encounter.            Passed - Patient is age 10 or older        Passed - Patient's CR is NOT>1.4 OR Patient's EGFR is NOT<45 within past 12 mos.     Recent Labs   Lab Test 06/04/21  1100   GFRESTIMATED 60*   GFRESTBLACK >60       Recent Labs   Lab Test 06/04/21  1100   CR 0.95             Passed - Patient does NOT have a diagnosis of CHF.        Passed - Medication is active on med list        Passed - Patient is not pregnant        Passed - Patient has not had a positive pregnancy test within the past 12 mos.          Signed Prescriptions Disp Refills    atorvastatin (LIPITOR) 80 MG tablet 90 tablet 2     Sig: TAKE ONE TABLET BY MOUTH AT BEDTIME       Statins Protocol Passed - 2/9/2022  4:25 AM        Passed - LDL on file in past 12 months     Recent Labs   Lab " "Test 06/04/21  1100   LDL 71             Passed - No abnormal creatine kinase in past 12 months     No lab results found.             Passed - Recent (12 mo) or future (30 days) visit within the authorizing provider's specialty     Patient has had an office visit with the authorizing provider or a provider within the authorizing providers department within the previous 12 mos or has a future within next 30 days. See \"Patient Info\" tab in inbasket, or \"Choose Columns\" in Meds & Orders section of the refill encounter.              Passed - Medication is active on med list        Passed - Patient is age 18 or older        Passed - No active pregnancy on record        Passed - No positive pregnancy test in past 12 months             Eron Eric RN 03/09/22 3:00 PM  "

## 2022-03-11 RX ORDER — METFORMIN HCL 500 MG
2000 TABLET, EXTENDED RELEASE 24 HR ORAL
Qty: 360 TABLET | Refills: 1 | Status: SHIPPED | OUTPATIENT
Start: 2022-03-11 | End: 2022-09-14

## 2022-04-28 ENCOUNTER — OFFICE VISIT (OUTPATIENT)
Dept: CARDIOLOGY | Facility: CLINIC | Age: 62
End: 2022-04-28
Payer: COMMERCIAL

## 2022-04-28 VITALS
HEART RATE: 85 BPM | DIASTOLIC BLOOD PRESSURE: 70 MMHG | WEIGHT: 211.4 LBS | RESPIRATION RATE: 17 BRPM | OXYGEN SATURATION: 97 % | BODY MASS INDEX: 35.18 KG/M2 | SYSTOLIC BLOOD PRESSURE: 100 MMHG

## 2022-04-28 DIAGNOSIS — I51.89 MILD LEFT VENTRICULAR SYSTOLIC DYSFUNCTION: ICD-10-CM

## 2022-04-28 DIAGNOSIS — I25.5 ISCHEMIC CARDIOMYOPATHY: ICD-10-CM

## 2022-04-28 LAB
ALBUMIN SERPL-MCNC: 4.4 G/DL (ref 3.5–5)
ALP SERPL-CCNC: 56 U/L (ref 45–120)
ALT SERPL W P-5'-P-CCNC: 14 U/L (ref 0–45)
ANION GAP SERPL CALCULATED.3IONS-SCNC: 11 MMOL/L (ref 5–18)
AST SERPL W P-5'-P-CCNC: 15 U/L (ref 0–40)
BILIRUB SERPL-MCNC: 0.4 MG/DL (ref 0–1)
BNP SERPL-MCNC: 17 PG/ML (ref 0–93)
BUN SERPL-MCNC: 19 MG/DL (ref 8–22)
CALCIUM SERPL-MCNC: 10 MG/DL (ref 8.5–10.5)
CHLORIDE BLD-SCNC: 104 MMOL/L (ref 98–107)
CHOLEST SERPL-MCNC: 161 MG/DL
CO2 SERPL-SCNC: 23 MMOL/L (ref 22–31)
CREAT SERPL-MCNC: 0.77 MG/DL (ref 0.6–1.1)
FASTING STATUS PATIENT QL REPORTED: YES
GFR SERPL CREATININE-BSD FRML MDRD: 88 ML/MIN/1.73M2
GLUCOSE BLD-MCNC: 155 MG/DL (ref 70–125)
HDLC SERPL-MCNC: 42 MG/DL
LDLC SERPL CALC-MCNC: 87 MG/DL
POTASSIUM BLD-SCNC: 4.6 MMOL/L (ref 3.5–5)
PROT SERPL-MCNC: 7.6 G/DL (ref 6–8)
SODIUM SERPL-SCNC: 138 MMOL/L (ref 136–145)
TRIGL SERPL-MCNC: 161 MG/DL

## 2022-04-28 PROCEDURE — 99214 OFFICE O/P EST MOD 30 MIN: CPT | Performed by: INTERNAL MEDICINE

## 2022-04-28 PROCEDURE — 80053 COMPREHEN METABOLIC PANEL: CPT | Performed by: INTERNAL MEDICINE

## 2022-04-28 PROCEDURE — 80061 LIPID PANEL: CPT | Performed by: INTERNAL MEDICINE

## 2022-04-28 PROCEDURE — 36415 COLL VENOUS BLD VENIPUNCTURE: CPT | Performed by: INTERNAL MEDICINE

## 2022-04-28 PROCEDURE — 83880 ASSAY OF NATRIURETIC PEPTIDE: CPT | Performed by: INTERNAL MEDICINE

## 2022-04-28 NOTE — PROGRESS NOTES
HEART CARE ENCOUNTER CONSULTATON NOTE      [facility]  [phone]      Assessment/Recommendations   Assessment/Plan:  1.  Coronary artery disease.  Admitted October 2019 with acute coronary syndrome with shortness of breath and chest discomfort.  She has not had recurrent symptoms.  She does have evidence for diffuse coronary artery disease including a chronically occluded right coronary artery and intervention to the circumflex as described.  Its been approximately 3 years and we discussed a follow-up stress testing.  She would be limited in her ability to walk on the treadmill but given the mild reduction in LV function we decided to pursue a Lexiscan nuclear stress test.  I described this test in detail.  She is currently taking 162 mg of aspirin.    2.  Mild left ventricular dysfunction.  Ejection fraction June 2021 reported EF 45 to 50% with basal and mid inferior wall hypokinesis and posterior hypokinesis.  She does not have findings of overt congestive heart failure.  She would like to try to cut back on the furosemide.  I think pending laboratory studies it would be reasonable to decrease from 40 mg to 20 mg asking her to monitor her weights and monitoring for symptoms.  Further recommendations pending the outcome of the plan tests.  She has been maintained on losartan 25 mg daily and metoprolol 12.5 mg daily.  Blood pressure looks well controlled today.    3.  Dyslipidemia.  Plan to repeat lipids today.  Most recent lipids are from June 2021 at which time total cholesterol was 135, HDL 35, LDL of 71.  Current medication regimen includes 80 mg of atorvastatin and 10 mg of Zetia.    Plan.  1.  Laboratory studies today  2.  Lexiscan nuclear stress test  3.  Follow-up in 6 months with additional recommendations pending the above tests.       History of Present Illness/Subjective    HPI: Catina Acuna is a 61 year old female who is seen in follow-up.  She reports that overall she has  been feeling well.  She specifically denies chest pain or increased shortness of breath.  She was admitted in October 2019 with acute coronary syndrome with symptoms of shortness of breath and chest discomfort.  She had been experiencing some burning chest discomfort for a few weeks prior to presentation.  She had documented under percent occlusion of the right coronary artery as well as an 80% stenosis in the circumflex with a subsequent stent placement.  She has had no additional similar symptoms.  Echocardiogram from June 2021 revealed an ejection fraction of 45 to 50% with basilar and mid inferior wall hypokinesis and basilar posterior hypokinesis with known occlusion of the right coronary artery.  She has not been tolerant of higher doses of metoprolol.  She is frustrated by the increased frequency of urination and is not describing any symptoms consistent with fluid retention and would like to consider cutting back on her diuretics.  We discussed checking laboratory studies today and if numbers are stable we will likely try to cut to 20 mg daily with close monitoring.  I did ask her to try to weigh herself on a regular basis especially as we decide about cutting back on her diuretics.    Recent Echocardiogram Results:  Echocardiogram Complete  Order: [id number]   Status: Final result     Visible to patient: Yes (seen)     Next appt: 04/28/2022 at 02:20 PM in Cardiology (Roberto Mabry MD)     Dx: CAD (coronary artery disease)     1 Result Note     1 Patient Communication    Details    Reading Physician Reading Date Result Priority   Edgar Beavers MD  [phone] 6/4/2021 Routine   Provider, Historical 6/4/2021      Narrative & Impression  1. The left ventricle is normal in size. Left ventricular systolic performance is mildly reduced. The ejection fraction is estimated to be 45-50%.    2. There is severe basal and mid inferior hypokinesis.  There is severe basal posterior hypokinesis.  3. No  significant valvular heart disease is identified on this study.   4. Normal right ventricular size and systolic performance.      When compared to the prior real-time echocardiogram dated 17 January 2020, there has been little appreciable interval change.  Specifically, it is this reader s impression that left ventricular systolic performance and regional wall motion appears   similar on both studies.      Component            Recent Coronary Angiogram Results:    Catina Acuna  Coronary Angiogram  Order# [id number]  Reading physician: Chip Escalera MD Ordering physician: Eriberto Croft MD Study date: 10/31/2019       Patient Information    Name MRN Description   Catina Acuna [id number] 58 year old female             Indications    Non-STEMI (non-ST elevated myocardial infarction) (H) [I21.4 (ICD-10-CM)]             Conclusion      Ost RCA to Mid RCA lesion is 35% stenosed.    Mid RCA lesion is 100% stenosed.    Mid Cx to Dist Cx lesion is 80% stenosed.    Pressure wire/FFR was performed on the LCX lesion. pre diagnositic: 0.4. post diagnostic: 0.4.    A drug eluting stent was successfully placed into LCX.          Physical Examination  Review of Systems   Vitals: 100/70, weight 211 pounds, heart rate of 85 and regular  Wt Readings from Last 3 Encounters:   09/02/21 90.3 kg (199 lb)   10/12/20 85.3 kg (188 lb)   04/21/20 87.1 kg (192 lb)       General Appearance:   no distress, normal body habitus   ENT/Mouth: membranes moist, no oral lesions or bleeding gums.      EYES:  no scleral icterus, normal conjunctivae   Neck: no carotid bruits or thyromegaly   Chest/Lungs:   lungs are clear to auscultation, no rales or wheezing, , equal chest wall expansion    Cardiovascular:   Regular. Normal first and second heart sounds with no murmurs, rubs, or gallops; the carotid, radial and posterior tibial pulses are intact, Jugular venous pressure within normal limits, no significant edema bilaterally    Abdomen:  no  organomegaly, masses, bruits, or tenderness; bowel sounds are present   Extremities: no cyanosis or clubbing   Skin: no xanthelasma, warm.    Neurologic: normal  bilateral, no tremors     Psychiatric: alert and oriented x3, calm        Please refer above for cardiac ROS details.        Medical History  Surgical History Family History Social History   Past Medical History:   Diagnosis Date     Depression      NSTEMI (non-ST elevated myocardial infarction) (H) 10/30/2019     Past Surgical History:   Procedure Laterality Date     ANKLE FRACTURE SURGERY       CV CORONARY ANGIOGRAM N/A 10/31/2019    Procedure: Coronary Angiogram;  Surgeon: Chip Escalera MD;  Location: [facility];  Service: Cardiology     Family History   Problem Relation Age of Onset     Hypertension Mother      Heart Failure Mother      Dementia Mother      Heart Disease Mother      Hypertension Father      Heart Disease Father      Diabetes Father      Emphysema Father      Heart Disease Sister 58.00        4 vessel CBAG        Social History     Socioeconomic History     Marital status: Single     Spouse name: Not on file     Number of children: Not on file     Years of education: Not on file     Highest education level: Not on file   Occupational History     Not on file   Tobacco Use     Smoking status: Former Smoker     Packs/day: 0.10     Years: 20.00     Pack years: 2.00     Types: Cigarettes     Smokeless tobacco: Never Used   Substance and Sexual Activity     Alcohol use: No     Comment: Alcoholic Drinks/day: h/o alcohol abuse      Drug use: No     Sexual activity: Never     Comment: single   Other Topics Concern     Not on file   Social History Narrative     Not on file     Social Determinants of Health     Financial Resource Strain: Not on file   Food Insecurity: Not on file   Transportation Needs: Not on file   Physical Activity: Not on file   Stress: Not on file   Social Connections: Not on file   Intimate Partner  Violence: Not on file   Housing Stability: Not on file           Medications  Allergies   Current Outpatient Medications   Medication Sig Dispense Refill     acetaminophen (TYLENOL) 500 MG tablet [ACETAMINOPHEN (TYLENOL) 500 MG TABLET] Take 500 mg by mouth as needed for pain.       ADVAIR DISKUS 250-50 mcg/dose DISKUS [ADVAIR DISKUS 250-50 MCG/DOSE DISKUS] INHALE ONE PUFF BY MOUTH TWICE A DAY 60 each 3     aspirin 81 mg chewable tablet [ASPIRIN 81 MG CHEWABLE TABLET] Chew 1 tablet (81 mg total) daily. 30 tablet 0     atorvastatin (LIPITOR) 80 MG tablet TAKE ONE TABLET BY MOUTH AT BEDTIME 90 tablet 2     blood glucose test (CONTOUR NEXT TEST STRIPS) strips [BLOOD GLUCOSE TEST (CONTOUR NEXT TEST STRIPS) STRIPS] Use 1 each As Directed daily. Dispense brand per patient's insurance at pharmacy discretion. 100 strip 3     blood-glucose meter (CONTOUR NEXT METER) Misc [BLOOD-GLUCOSE METER (CONTOUR NEXT METER) MISC] Use 1 each As Directed daily. 1 each 0     BRILINTA 90 mg Tab [BRILINTA 90 MG TAB] TAKE ONE TABLET BY MOUTH TWICE A  tablet 2     ezetimibe (ZETIA) 10 MG tablet TAKE ONE TABLET BY MOUTH EVERY DAY 90 tablet 1     furosemide (LASIX) 40 MG tablet Take 1 tablet (40 mg) by mouth daily 90 tablet 1     Lancets (ONETOUCH DELICA PLUS [id number]) MISC USE TO TEST BLOOD SUGAR ONCE A  each 3     losartan (COZAAR) 25 MG tablet Take 1 tablet (25 mg) by mouth daily 90 tablet 2     metFORMIN (GLUCOPHAGE-XR) 500 MG 24 hr tablet Take 4 tablets (2,000 mg) by mouth daily (with dinner) 360 tablet 1     metoprolol succinate ER (TOPROL-XL) 25 MG 24 hr tablet Take 0.5 tablets (12.5 mg) by mouth At Bedtime 45 tablet 3     miscellaneous medical supply (BLOOD PRESSURE CUFF) Misc [MISCELLANEOUS MEDICAL SUPPLY (BLOOD PRESSURE CUFF) MISC] Pt to take BP daily. Pt states needs average size cuff. 1 each 1     nitroGLYcerin (NITROSTAT) 0.4 MG sublingual tablet Place 1 tablet (0.4 mg) under the tongue every 5 minutes as needed for  chest pain 25 tablet 4     ONETOUCH DELICA PLUS LANCET 33 gauge Misc [ONETOUCH DELICA PLUS LANCET 33 GAUGE MISC] USE TO TEST BLOOD SUGAR ONCE DAILY 100 each 3     senna (SENOKOT) 8.6 mg tablet [SENNA (SENOKOT) 8.6 MG TABLET] Take 1 tablet by mouth daily as needed for constipation.       No Known Allergies       Lab Results    Chemistry/lipid CBC Cardiac Enzymes/BNP/TSH/INR   Recent Labs   Lab Test 06/04/21  1100   CHOL 135   HDL 35*   LDL 71   TRIG 145     Recent Labs   Lab Test 06/04/21  1100 10/12/20  0928 01/17/20  0852   LDL 71 86 75     Recent Labs   Lab Test 06/04/21  1100      POTASSIUM 3.7   CHLORIDE 104   CO2 25   *   BUN 18   CR 0.95   GFRESTIMATED 60*   DARLYN 9.6     Recent Labs   Lab Test 06/04/21  1100 10/12/20  0928 04/23/20  1446   CR 0.95 0.87 0.82     Recent Labs   Lab Test 10/12/20  0928 04/23/20  1446 11/01/19  0558   A1C 7.3* 7.5* 8.5*          Recent Labs   Lab Test 10/30/19  0814   WBC 8.9   HGB 11.1*   HCT 33.3*   MCV 90        Recent Labs   Lab Test 10/30/19  0814   HGB 11.1*    Recent Labs   Lab Test 11/01/19  0558 10/30/19  1159 10/30/19  1156   TROPONINI 0.59* 0.84* 0.86*     Recent Labs   Lab Test 04/23/20  1446 10/30/19  0814   BNP 33 327*     Recent Labs   Lab Test 10/12/20  0950   TSH 2.76     No results for input(s): INR in the last 15084 hours.     Roberto Mabry MD

## 2022-04-28 NOTE — PATIENT INSTRUCTIONS
I will be in touch with the blood work.We can decide about the water pill after the blood work.Please consider weighing yourself each morning.My nurse is Sherie and her number is [phone].Please call if your weight goes up more than 3to 5 pounds in a few day period of time.We are going to plan a non exercise stress test.

## 2022-04-28 NOTE — LETTER
4/28/2022    Lucretia Raya MD  [address]  [facility] [address]    RE: Catina BLOOM Armand       Dear Colleague,     I had the pleasure of seeing Catina Acuna in [facility].    HEART CARE ENCOUNTER CONSULTATON NOTE      [facility]  [phone]      Assessment/Recommendations   Assessment/Plan:  1.  Coronary artery disease.  Admitted October 2019 with acute coronary syndrome with shortness of breath and chest discomfort.  She has not had recurrent symptoms.  She does have evidence for diffuse coronary artery disease including a chronically occluded right coronary artery and intervention to the circumflex as described.  Its been approximately 3 years and we discussed a follow-up stress testing.  She would be limited in her ability to walk on the treadmill but given the mild reduction in LV function we decided to pursue a Lexiscan nuclear stress test.  I described this test in detail.  She is currently taking 162 mg of aspirin.    2.  Mild left ventricular dysfunction.  Ejection fraction June 2021 reported EF 45 to 50% with basal and mid inferior wall hypokinesis and posterior hypokinesis.  She does not have findings of overt congestive heart failure.  She would like to try to cut back on the furosemide.  I think pending laboratory studies it would be reasonable to decrease from 40 mg to 20 mg asking her to monitor her weights and monitoring for symptoms.  Further recommendations pending the outcome of the plan tests.  She has been maintained on losartan 25 mg daily and metoprolol 12.5 mg daily.  Blood pressure looks well controlled today.    3.  Dyslipidemia.  Plan to repeat lipids today.  Most recent lipids are from June 2021 at which time total cholesterol was 135, HDL 35, LDL of 71.  Current medication regimen includes 80 mg of atorvastatin and 10 mg of Zetia.    Plan.  1.  Laboratory studies today  2.  Lexiscan nuclear stress test  3.  Follow-up in 6 months  with additional recommendations pending the above tests.       History of Present Illness/Subjective    HPI: Catina Acuna is a 61 year old female who is seen in follow-up.  She reports that overall she has been feeling well.  She specifically denies chest pain or increased shortness of breath.  She was admitted in October 2019 with acute coronary syndrome with symptoms of shortness of breath and chest discomfort.  She had been experiencing some burning chest discomfort for a few weeks prior to presentation.  She had documented under percent occlusion of the right coronary artery as well as an 80% stenosis in the circumflex with a subsequent stent placement.  She has had no additional similar symptoms.  Echocardiogram from June 2021 revealed an ejection fraction of 45 to 50% with basilar and mid inferior wall hypokinesis and basilar posterior hypokinesis with known occlusion of the right coronary artery.  She has not been tolerant of higher doses of metoprolol.  She is frustrated by the increased frequency of urination and is not describing any symptoms consistent with fluid retention and would like to consider cutting back on her diuretics.  We discussed checking laboratory studies today and if numbers are stable we will likely try to cut to 20 mg daily with close monitoring.  I did ask her to try to weigh herself on a regular basis especially as we decide about cutting back on her diuretics.    Recent Echocardiogram Results:  Echocardiogram Complete  Order: [id number]   Status: Final result     Visible to patient: Yes (seen)     Next appt: 04/28/2022 at 02:20 PM in Cardiology (Roberto Mabry MD)     Dx: CAD (coronary artery disease)     1 Result Note     1 Patient Communication    Details    Reading Physician Reading Date Result Priority   Edgar Beavers MD  [phone] 6/4/2021 Routine   Provider, Historical 6/4/2021      Narrative & Impression  1. The left ventricle is normal in size. Left  ventricular systolic performance is mildly reduced. The ejection fraction is estimated to be 45-50%.    2. There is severe basal and mid inferior hypokinesis.  There is severe basal posterior hypokinesis.  3. No significant valvular heart disease is identified on this study.   4. Normal right ventricular size and systolic performance.      When compared to the prior real-time echocardiogram dated 17 January 2020, there has been little appreciable interval change.  Specifically, it is this reader s impression that left ventricular systolic performance and regional wall motion appears   similar on both studies.      Component            Recent Coronary Angiogram Results:    Catina Acuna  Coronary Angiogram  Order# [id number]  Reading physician: Chip Escalera MD Ordering physician: Eriberto Croft MD Study date: 10/31/2019       Patient Information    Name MRN Description   Catina Acuna [id number] 58 year old female             Indications    Non-STEMI (non-ST elevated myocardial infarction) (H) [I21.4 (ICD-10-CM)]             Conclusion      Ost RCA to Mid RCA lesion is 35% stenosed.    Mid RCA lesion is 100% stenosed.    Mid Cx to Dist Cx lesion is 80% stenosed.    Pressure wire/FFR was performed on the LCX lesion. pre diagnositic: 0.4. post diagnostic: 0.4.    A drug eluting stent was successfully placed into LCX.          Physical Examination  Review of Systems   Vitals: 100/70, weight 211 pounds, heart rate of 85 and regular  Wt Readings from Last 3 Encounters:   09/02/21 90.3 kg (199 lb)   10/12/20 85.3 kg (188 lb)   04/21/20 87.1 kg (192 lb)       General Appearance:   no distress, normal body habitus   ENT/Mouth: membranes moist, no oral lesions or bleeding gums.      EYES:  no scleral icterus, normal conjunctivae   Neck: no carotid bruits or thyromegaly   Chest/Lungs:   lungs are clear to auscultation, no rales or wheezing, , equal chest wall expansion    Cardiovascular:   Regular. Normal first and  second heart sounds with no murmurs, rubs, or gallops; the carotid, radial and posterior tibial pulses are intact, Jugular venous pressure within normal limits, no significant edema bilaterally    Abdomen:  no organomegaly, masses, bruits, or tenderness; bowel sounds are present   Extremities: no cyanosis or clubbing   Skin: no xanthelasma, warm.    Neurologic: normal  bilateral, no tremors     Psychiatric: alert and oriented x3, calm        Please refer above for cardiac ROS details.        Medical History  Surgical History Family History Social History   Past Medical History:   Diagnosis Date     Depression      NSTEMI (non-ST elevated myocardial infarction) (H) 10/30/2019     Past Surgical History:   Procedure Laterality Date     ANKLE FRACTURE SURGERY       CV CORONARY ANGIOGRAM N/A 10/31/2019    Procedure: Coronary Angiogram;  Surgeon: Chip Escalera MD;  Location: [facility];  Service: Cardiology     Family History   Problem Relation Age of Onset     Hypertension Mother      Heart Failure Mother      Dementia Mother      Heart Disease Mother      Hypertension Father      Heart Disease Father      Diabetes Father      Emphysema Father      Heart Disease Sister 58.00        4 vessel CBAG        Social History     Socioeconomic History     Marital status: Single     Spouse name: Not on file     Number of children: Not on file     Years of education: Not on file     Highest education level: Not on file   Occupational History     Not on file   Tobacco Use     Smoking status: Former Smoker     Packs/day: 0.10     Years: 20.00     Pack years: 2.00     Types: Cigarettes     Smokeless tobacco: Never Used   Substance and Sexual Activity     Alcohol use: No     Comment: Alcoholic Drinks/day: h/o alcohol abuse      Drug use: No     Sexual activity: Never     Comment: single   Other Topics Concern     Not on file   Social History Narrative     Not on file     Social Determinants of Health     Financial  Resource Strain: Not on file   Food Insecurity: Not on file   Transportation Needs: Not on file   Physical Activity: Not on file   Stress: Not on file   Social Connections: Not on file   Intimate Partner Violence: Not on file   Housing Stability: Not on file           Medications  Allergies   Current Outpatient Medications   Medication Sig Dispense Refill     acetaminophen (TYLENOL) 500 MG tablet [ACETAMINOPHEN (TYLENOL) 500 MG TABLET] Take 500 mg by mouth as needed for pain.       ADVAIR DISKUS 250-50 mcg/dose DISKUS [ADVAIR DISKUS 250-50 MCG/DOSE DISKUS] INHALE ONE PUFF BY MOUTH TWICE A DAY 60 each 3     aspirin 81 mg chewable tablet [ASPIRIN 81 MG CHEWABLE TABLET] Chew 1 tablet (81 mg total) daily. 30 tablet 0     atorvastatin (LIPITOR) 80 MG tablet TAKE ONE TABLET BY MOUTH AT BEDTIME 90 tablet 2     blood glucose test (CONTOUR NEXT TEST STRIPS) strips [BLOOD GLUCOSE TEST (CONTOUR NEXT TEST STRIPS) STRIPS] Use 1 each As Directed daily. Dispense brand per patient's insurance at pharmacy discretion. 100 strip 3     blood-glucose meter (CONTOUR NEXT METER) Misc [BLOOD-GLUCOSE METER (CONTOUR NEXT METER) MISC] Use 1 each As Directed daily. 1 each 0     BRILINTA 90 mg Tab [BRILINTA 90 MG TAB] TAKE ONE TABLET BY MOUTH TWICE A  tablet 2     ezetimibe (ZETIA) 10 MG tablet TAKE ONE TABLET BY MOUTH EVERY DAY 90 tablet 1     furosemide (LASIX) 40 MG tablet Take 1 tablet (40 mg) by mouth daily 90 tablet 1     Lancets (ONETOUCH DELICA PLUS [id number]) MISC USE TO TEST BLOOD SUGAR ONCE A  each 3     losartan (COZAAR) 25 MG tablet Take 1 tablet (25 mg) by mouth daily 90 tablet 2     metFORMIN (GLUCOPHAGE-XR) 500 MG 24 hr tablet Take 4 tablets (2,000 mg) by mouth daily (with dinner) 360 tablet 1     metoprolol succinate ER (TOPROL-XL) 25 MG 24 hr tablet Take 0.5 tablets (12.5 mg) by mouth At Bedtime 45 tablet 3     miscellaneous medical supply (BLOOD PRESSURE CUFF) Misc [MISCELLANEOUS MEDICAL SUPPLY (BLOOD PRESSURE  CUFF) MISC] Pt to take BP daily. Pt states needs average size cuff. 1 each 1     nitroGLYcerin (NITROSTAT) 0.4 MG sublingual tablet Place 1 tablet (0.4 mg) under the tongue every 5 minutes as needed for chest pain 25 tablet 4     ONETOUCH DELICA PLUS LANCET 33 gauge Misc [ONETOUCH DELICA PLUS LANCET 33 GAUGE MISC] USE TO TEST BLOOD SUGAR ONCE DAILY 100 each 3     senna (SENOKOT) 8.6 mg tablet [SENNA (SENOKOT) 8.6 MG TABLET] Take 1 tablet by mouth daily as needed for constipation.       No Known Allergies       Lab Results    Chemistry/lipid CBC Cardiac Enzymes/BNP/TSH/INR   Recent Labs   Lab Test 06/04/21  1100   CHOL 135   HDL 35*   LDL 71   TRIG 145     Recent Labs   Lab Test 06/04/21  1100 10/12/20  0928 01/17/20  0852   LDL 71 86 75     Recent Labs   Lab Test 06/04/21  1100      POTASSIUM 3.7   CHLORIDE 104   CO2 25   *   BUN 18   CR 0.95   GFRESTIMATED 60*   DARLYN 9.6     Recent Labs   Lab Test 06/04/21  1100 10/12/20  0928 04/23/20  1446   CR 0.95 0.87 0.82     Recent Labs   Lab Test 10/12/20  0928 04/23/20  1446 11/01/19  0558   A1C 7.3* 7.5* 8.5*          Recent Labs   Lab Test 10/30/19  0814   WBC 8.9   HGB 11.1*   HCT 33.3*   MCV 90        Recent Labs   Lab Test 10/30/19  0814   HGB 11.1*    Recent Labs   Lab Test 11/01/19  0558 10/30/19  1159 10/30/19  1156   TROPONINI 0.59* 0.84* 0.86*     Recent Labs   Lab Test 04/23/20  1446 10/30/19  0814   BNP 33 327*     Recent Labs   Lab Test 10/12/20  0950   TSH 2.76     No results for input(s): INR in the last 87294 hours.     Roberto Mabry MD                Thank you for allowing me to participate in the care of your patient.      Sincerely,     Roberto Mabry MD     [facility]  cc:   Roberto Mabry MD  [address]  [address]  [place] [address]

## 2022-05-10 DIAGNOSIS — I25.5 ISCHEMIC CARDIOMYOPATHY: ICD-10-CM

## 2022-05-10 RX ORDER — FUROSEMIDE 40 MG
TABLET ORAL
Qty: 90 TABLET | Refills: 1 | Status: SHIPPED | OUTPATIENT
Start: 2022-05-10 | End: 2022-06-06

## 2022-05-11 DIAGNOSIS — E11.8 TYPE 2 DIABETES MELLITUS WITH COMPLICATION, WITHOUT LONG-TERM CURRENT USE OF INSULIN (H): ICD-10-CM

## 2022-05-11 NOTE — TELEPHONE ENCOUNTER
Refill Request  Medication name: Pending Prescriptions:                       Disp   Refills    blood glucose (CONTOUR NEXT TEST) test st*100 st*3            Si strip by In Vitro route daily    Who prescribed the medication: Lucretia Raya  Last refill on medication: 21  Requested Pharmacy: Jun  Last appointment with PCP: 10/12/2020  Next appointment: Appointment scheduled for 22

## 2022-06-06 ENCOUNTER — TELEPHONE (OUTPATIENT)
Dept: CARDIOLOGY | Facility: CLINIC | Age: 62
End: 2022-06-06
Payer: COMMERCIAL

## 2022-06-06 DIAGNOSIS — E78.2 MIXED HYPERLIPIDEMIA: Primary | ICD-10-CM

## 2022-06-06 DIAGNOSIS — I25.5 ISCHEMIC CARDIOMYOPATHY: ICD-10-CM

## 2022-06-06 RX ORDER — FUROSEMIDE 20 MG
20 TABLET ORAL DAILY
Qty: 90 TABLET | Refills: 1 | Status: SHIPPED | OUTPATIENT
Start: 2022-06-06 | End: 2023-07-11

## 2022-06-06 NOTE — TELEPHONE ENCOUNTER
Pt cancelled previously scheduled Lexiscan nuclear stress, asking if there are any other options.  She is not able to walk on treadmill, and unable to tolerate MRI.    Dr Mabry - any other recommendations?  Her concern is the stuff that would be injected in to her.  -ra    ===View-only below this line===  ----- Message -----  From: Roberto Mabry MD  Sent: 6/6/2022   4:33 PM CDT  To: Sherie Burch RN    Thank you for the update.  Lets recheck lipid AST and ALT in 6 to 8 weeks.  Thank you mdg

## 2022-06-06 NOTE — TELEPHONE ENCOUNTER
Roberto Mabry MD   4/28/2022  8:58 PM CDT         Bnp is normal, we discussed cutting the lasix from 20mg to 40mg, monitoring her weights, fluid retention, shortness of breath and update us in a few weeks.  The cholesterol numbers are less ideal than previous.Can we please confirm she is taking the 80mg of atorvastatin as well as the Zetia. She wasn't clear if she was taking the zetia  mdg     Furosemide dose updated.  -ross

## 2022-06-07 NOTE — TELEPHONE ENCOUNTER
Unfortunately there is not another good option I do not think CT angiography would be informative.  Hopefully she will reconsider.  Thanks    Roberto Mabry MD

## 2022-06-10 NOTE — TELEPHONE ENCOUNTER
Recommendations discussed with pt.  Pt verbalized understanding and had no questions.  She requests call back from scheduling to set up appt for nuclear stress test.  -ross

## 2022-07-05 ENCOUNTER — HOSPITAL ENCOUNTER (OUTPATIENT)
Dept: NUCLEAR MEDICINE | Facility: CLINIC | Age: 62
Discharge: HOME | End: 2022-07-05
Attending: INTERNAL MEDICINE
Payer: COMMERCIAL

## 2022-07-05 ENCOUNTER — HOSPITAL ENCOUNTER (OUTPATIENT)
Dept: CARDIOLOGY | Facility: CLINIC | Age: 62
Discharge: HOME | End: 2022-07-05
Attending: INTERNAL MEDICINE
Payer: COMMERCIAL

## 2022-07-05 DIAGNOSIS — I51.89 MILD LEFT VENTRICULAR SYSTOLIC DYSFUNCTION: ICD-10-CM

## 2022-07-05 DIAGNOSIS — I25.5 ISCHEMIC CARDIOMYOPATHY: ICD-10-CM

## 2022-07-05 LAB
NUC STRESS EJECTION FRACTION: 50 %
STRESS ECHO TARGET HR: 160

## 2022-07-05 PROCEDURE — 93017 CV STRESS TEST TRACING ONLY: CPT | Performed by: INTERNAL MEDICINE

## 2022-07-05 PROCEDURE — 250N000011 HC RX IP 250 OP 636: Performed by: INTERNAL MEDICINE

## 2022-07-05 PROCEDURE — 93018 CV STRESS TEST I&R ONLY: CPT | Performed by: INTERNAL MEDICINE

## 2022-07-05 PROCEDURE — 343N000001 HC RX 343: Performed by: INTERNAL MEDICINE

## 2022-07-05 PROCEDURE — A9500 TC99M SESTAMIBI: HCPCS | Performed by: INTERNAL MEDICINE

## 2022-07-05 PROCEDURE — 93016 CV STRESS TEST SUPVJ ONLY: CPT | Performed by: INTERNAL MEDICINE

## 2022-07-05 PROCEDURE — 78452 HT MUSCLE IMAGE SPECT MULT: CPT | Mod: 26 | Performed by: INTERNAL MEDICINE

## 2022-07-05 RX ORDER — REGADENOSON 0.08 MG/ML
0.4 INJECTION, SOLUTION INTRAVENOUS ONCE
Status: COMPLETED | OUTPATIENT
Start: 2022-07-05 | End: 2022-07-05

## 2022-07-05 RX ORDER — AMINOPHYLLINE 25 MG/ML
50 INJECTION, SOLUTION INTRAVENOUS
Status: DISCONTINUED | OUTPATIENT
Start: 2022-07-05 | End: 2022-07-05 | Stop reason: HOSPADM

## 2022-07-05 RX ADMIN — REGADENOSON 0.4 MG: 0.08 INJECTION, SOLUTION INTRAVENOUS at 13:32

## 2022-07-05 RX ADMIN — Medication 30.3 MILLICURIE: at 13:40

## 2022-07-05 RX ADMIN — AMINOPHYLLINE 50 MG: 25 INJECTION, SOLUTION INTRAVENOUS at 13:36

## 2022-07-05 RX ADMIN — Medication 8.7 MILLICURIE: at 12:22

## 2022-07-05 NOTE — PROGRESS NOTES
Last fall pt had chest pain and sob.  States has cardiac hx without recent testing.  States having chest pain off and on since last September.  States NÚÑEZ.  Poor historian.    Lelia Wray RN

## 2022-07-23 DIAGNOSIS — I25.10 CAD (CORONARY ARTERY DISEASE): ICD-10-CM

## 2022-07-25 RX ORDER — EZETIMIBE 10 MG/1
TABLET ORAL
Qty: 90 TABLET | Refills: 1 | Status: SHIPPED | OUTPATIENT
Start: 2022-07-25 | End: 2023-01-16

## 2022-08-27 ENCOUNTER — HEALTH MAINTENANCE LETTER (OUTPATIENT)
Age: 62
End: 2022-08-27

## 2022-09-13 DIAGNOSIS — E11.9 TYPE 2 DIABETES MELLITUS (H): ICD-10-CM

## 2022-09-14 RX ORDER — METFORMIN HCL 500 MG
TABLET, EXTENDED RELEASE 24 HR ORAL
Qty: 360 TABLET | Refills: 1 | Status: SHIPPED | OUTPATIENT
Start: 2022-09-14 | End: 2023-04-10

## 2022-12-13 PROBLEM — I51.89 MILD LEFT VENTRICULAR SYSTOLIC DYSFUNCTION: Status: RESOLVED | Noted: 2021-01-28 | Resolved: 2022-12-13

## 2022-12-13 PROBLEM — I50.22 CHRONIC HFREF (HEART FAILURE WITH REDUCED EJECTION FRACTION) (H): Status: ACTIVE | Noted: 2019-10-31

## 2022-12-13 ASSESSMENT — ENCOUNTER SYMPTOMS
CONSTIPATION: 1
HEMATURIA: 0
CHILLS: 0
FREQUENCY: 1
DIZZINESS: 0
DIARRHEA: 1
MYALGIAS: 1
WEAKNESS: 0
HEADACHES: 1
HEMATOCHEZIA: 0
ARTHRALGIAS: 1
ABDOMINAL PAIN: 0
NERVOUS/ANXIOUS: 0
FEVER: 0
PALPITATIONS: 0
HEARTBURN: 0
SHORTNESS OF BREATH: 1
EYE PAIN: 0
SORE THROAT: 0
PARESTHESIAS: 0
NAUSEA: 1
DYSURIA: 0
BREAST MASS: 0
JOINT SWELLING: 0
COUGH: 1

## 2022-12-13 ASSESSMENT — ACTIVITIES OF DAILY LIVING (ADL)
CURRENT_FUNCTION: TRANSPORTATION REQUIRES ASSISTANCE
CURRENT_FUNCTION: HOUSEWORK REQUIRES ASSISTANCE
CURRENT_FUNCTION: LAUNDRY REQUIRES ASSISTANCE

## 2022-12-13 ASSESSMENT — PATIENT HEALTH QUESTIONNAIRE - PHQ9
SUM OF ALL RESPONSES TO PHQ QUESTIONS 1-9: 10
SUM OF ALL RESPONSES TO PHQ QUESTIONS 1-9: 10
10. IF YOU CHECKED OFF ANY PROBLEMS, HOW DIFFICULT HAVE THESE PROBLEMS MADE IT FOR YOU TO DO YOUR WORK, TAKE CARE OF THINGS AT HOME, OR GET ALONG WITH OTHER PEOPLE: NOT DIFFICULT AT ALL

## 2022-12-14 ENCOUNTER — OFFICE VISIT (OUTPATIENT)
Dept: FAMILY MEDICINE | Facility: CLINIC | Age: 62
End: 2022-12-14
Payer: COMMERCIAL

## 2022-12-14 VITALS
WEIGHT: 209.8 LBS | OXYGEN SATURATION: 98 % | SYSTOLIC BLOOD PRESSURE: 120 MMHG | HEIGHT: 65 IN | RESPIRATION RATE: 16 BRPM | DIASTOLIC BLOOD PRESSURE: 72 MMHG | BODY MASS INDEX: 34.95 KG/M2 | TEMPERATURE: 98 F | HEART RATE: 102 BPM

## 2022-12-14 DIAGNOSIS — Z11.59 NEED FOR HEPATITIS C SCREENING TEST: ICD-10-CM

## 2022-12-14 DIAGNOSIS — F22 DELUSIONAL DISORDER (H): ICD-10-CM

## 2022-12-14 DIAGNOSIS — Z87.891 HISTORY OF TOBACCO ABUSE: ICD-10-CM

## 2022-12-14 DIAGNOSIS — Z87.891 PERSONAL HISTORY OF TOBACCO USE: ICD-10-CM

## 2022-12-14 DIAGNOSIS — Z00.00 MEDICARE ANNUAL WELLNESS VISIT, SUBSEQUENT: Primary | ICD-10-CM

## 2022-12-14 DIAGNOSIS — I25.10 CORONARY ARTERY DISEASE INVOLVING NATIVE CORONARY ARTERY OF NATIVE HEART WITHOUT ANGINA PECTORIS: ICD-10-CM

## 2022-12-14 DIAGNOSIS — Z12.4 CERVICAL CANCER SCREENING: ICD-10-CM

## 2022-12-14 DIAGNOSIS — G89.4 CHRONIC PAIN SYNDROME: ICD-10-CM

## 2022-12-14 DIAGNOSIS — R51.9 CHRONIC DAILY HEADACHE: ICD-10-CM

## 2022-12-14 DIAGNOSIS — E11.8 TYPE 2 DIABETES MELLITUS WITH COMPLICATION, WITHOUT LONG-TERM CURRENT USE OF INSULIN (H): ICD-10-CM

## 2022-12-14 DIAGNOSIS — Z12.31 VISIT FOR SCREENING MAMMOGRAM: ICD-10-CM

## 2022-12-14 DIAGNOSIS — M70.61 TROCHANTERIC BURSITIS OF RIGHT HIP: ICD-10-CM

## 2022-12-14 DIAGNOSIS — N63.10 MASSES OF BOTH BREASTS: ICD-10-CM

## 2022-12-14 DIAGNOSIS — G47.33 OSA (OBSTRUCTIVE SLEEP APNEA): ICD-10-CM

## 2022-12-14 DIAGNOSIS — N63.20 MASSES OF BOTH BREASTS: ICD-10-CM

## 2022-12-14 DIAGNOSIS — I50.22 CHRONIC HFREF (HEART FAILURE WITH REDUCED EJECTION FRACTION) (H): ICD-10-CM

## 2022-12-14 DIAGNOSIS — Z12.11 SCREEN FOR COLON CANCER: ICD-10-CM

## 2022-12-14 DIAGNOSIS — E78.2 MIXED HYPERLIPIDEMIA: ICD-10-CM

## 2022-12-14 DIAGNOSIS — N64.59 OTHER SIGNS AND SYMPTOMS IN BREAST: ICD-10-CM

## 2022-12-14 DIAGNOSIS — Z11.4 SCREENING FOR HIV (HUMAN IMMUNODEFICIENCY VIRUS): ICD-10-CM

## 2022-12-14 LAB
ALBUMIN SERPL BCG-MCNC: 4.7 G/DL (ref 3.5–5.2)
ALP SERPL-CCNC: 51 U/L (ref 35–104)
ALT SERPL W P-5'-P-CCNC: 14 U/L (ref 10–35)
ANION GAP SERPL CALCULATED.3IONS-SCNC: 16 MMOL/L (ref 7–15)
AST SERPL W P-5'-P-CCNC: 23 U/L (ref 10–35)
BILIRUB SERPL-MCNC: 0.2 MG/DL
BUN SERPL-MCNC: 17.3 MG/DL (ref 8–23)
CALCIUM SERPL-MCNC: 9.7 MG/DL (ref 8.8–10.2)
CHLORIDE SERPL-SCNC: 105 MMOL/L (ref 98–107)
CHOLEST SERPL-MCNC: 157 MG/DL
CREAT SERPL-MCNC: 0.69 MG/DL (ref 0.51–0.95)
DEPRECATED HCO3 PLAS-SCNC: 18 MMOL/L (ref 22–29)
GFR SERPL CREATININE-BSD FRML MDRD: >90 ML/MIN/1.73M2
GLUCOSE SERPL-MCNC: 149 MG/DL (ref 70–99)
HBA1C MFR BLD: 7.1 % (ref 0–5.6)
HDLC SERPL-MCNC: 47 MG/DL
LDLC SERPL CALC-MCNC: 73 MG/DL
NONHDLC SERPL-MCNC: 110 MG/DL
POTASSIUM SERPL-SCNC: 4.4 MMOL/L (ref 3.4–5.3)
PROT SERPL-MCNC: 7.4 G/DL (ref 6.4–8.3)
SODIUM SERPL-SCNC: 139 MMOL/L (ref 136–145)
TRIGL SERPL-MCNC: 183 MG/DL

## 2022-12-14 PROCEDURE — 83036 HEMOGLOBIN GLYCOSYLATED A1C: CPT | Performed by: FAMILY MEDICINE

## 2022-12-14 PROCEDURE — 82043 UR ALBUMIN QUANTITATIVE: CPT | Performed by: FAMILY MEDICINE

## 2022-12-14 PROCEDURE — G0296 VISIT TO DETERM LDCT ELIG: HCPCS | Performed by: FAMILY MEDICINE

## 2022-12-14 PROCEDURE — 36415 COLL VENOUS BLD VENIPUNCTURE: CPT | Performed by: FAMILY MEDICINE

## 2022-12-14 PROCEDURE — 80053 COMPREHEN METABOLIC PANEL: CPT | Performed by: FAMILY MEDICINE

## 2022-12-14 PROCEDURE — 80061 LIPID PANEL: CPT | Performed by: FAMILY MEDICINE

## 2022-12-14 PROCEDURE — 99214 OFFICE O/P EST MOD 30 MIN: CPT | Mod: 25 | Performed by: FAMILY MEDICINE

## 2022-12-14 PROCEDURE — 87389 HIV-1 AG W/HIV-1&-2 AB AG IA: CPT | Performed by: FAMILY MEDICINE

## 2022-12-14 PROCEDURE — G0439 PPPS, SUBSEQ VISIT: HCPCS | Performed by: FAMILY MEDICINE

## 2022-12-14 PROCEDURE — 86803 HEPATITIS C AB TEST: CPT | Performed by: FAMILY MEDICINE

## 2022-12-14 ASSESSMENT — ACTIVITIES OF DAILY LIVING (ADL)
CURRENT_FUNCTION: HOUSEWORK REQUIRES ASSISTANCE
CURRENT_FUNCTION: TRANSPORTATION REQUIRES ASSISTANCE
CURRENT_FUNCTION: LAUNDRY REQUIRES ASSISTANCE

## 2022-12-14 ASSESSMENT — ENCOUNTER SYMPTOMS
PARESTHESIAS: 0
DYSURIA: 0
HEADACHES: 1
BREAST MASS: 0
PALPITATIONS: 0
CONSTIPATION: 1
DIARRHEA: 1
DIZZINESS: 0
FREQUENCY: 1
SHORTNESS OF BREATH: 1
CHILLS: 0
JOINT SWELLING: 0
COUGH: 1
ABDOMINAL PAIN: 0
ARTHRALGIAS: 1
NAUSEA: 1
EYE PAIN: 0
HEMATOCHEZIA: 0
NERVOUS/ANXIOUS: 0
MYALGIAS: 1
FEVER: 0
HEMATURIA: 0
WEAKNESS: 0
HEARTBURN: 0
SORE THROAT: 0

## 2022-12-14 NOTE — PATIENT INSTRUCTIONS
Podiatry Clinics that offer foot and toenail care   [facility]   [facility]   [phone]     [facility] Clinics, [facility]   [phone]     [facility]   [place] - Dr. Vázquez on Tuesdays   [phone]     [facility]   [place]   [phone]     [facility]   [facility] [place]   [phone]     [facility]   [phone]     [facility]   [phone]     [facility]   They have several locations and have a team of registered nurses that offer diabetic foot care.   They do not bill to insurance and the average cost per visit is $37.   [facility], [facility]   [phone]     [facility]   *Nurse comes to your home for nail care.   Socorro Pollock RN Foot Specialist   [phone]    Patient Education   Personalized Prevention Plan  You are due for the preventive services outlined below.  Your care team is available to assist you in scheduling these services.  If you have already completed any of these items, please share that information with your care team to update in your medical record.  Health Maintenance Due   Topic Date Due    Diabetic Foot Exam  Never done    Eye Exam  Never done    Mammogram  Never done    Pneumococcal Vaccine (1 - PCV) Never done    Colorectal Cancer Screening  Never done    HIV Screening  Never done    Hepatitis C Screening  Never done    PAP Smear  Never done    Diptheria Tetanus Pertussis (DTAP/TDAP/TD) Vaccine (1 - Tdap) 08/27/2005    Zoster (Shingles) Vaccine (1 of 2) Never done    LUNG CANCER SCREENING  10/30/2020    [facility] Lab  04/12/2021    Kidney Microalbumin Urine Test  10/12/2021     Your Health Risk Assessment indicates you feel you are not in good health    A healthy lifestyle helps keep the body fit and the mind alert. It helps protect you from disease,  helps you fight disease, and helps prevent chronic disease (disease that doesn't go away) from getting worse. This is important as you get older and begin to notice twinges in muscles and joints and a decline in the strength and stamina you once took for granted. A healthy lifestyle includes good healthcare, good nutrition, weight control, recreation, and regular exercise. Avoid harmful substances and do what you can to keep safe. Another part of a healthy lifestyle is stay mentally active and socially involved.    Good healthcare   Have a wellness visit every year.   If you have new symptoms, let us know right away. Don't wait until the next checkup.   Take medicines exactly as prescribed and keep your medicines in a safe place. Tell us if your medicine causes problems.   Healthy diet and weight control   Eat 3 or 4 small, nutritious, low-fat, high-fiber meals a day. Include a variety of fruits, vegetables, and whole-grain foods.   Make sure you get enough calcium in your diet. Calcium, vitamin D, and exercise help prevent osteoporosis (bone thinning).   If you live alone, try eating with others when you can. That way you get a good meal and have company while you eat it.   Try to keep a healthy weight. If you eat more calories than your body uses for energy, it will be stored as fat and you will gain weight.     Recreation   Recreation is not limited to sports and team events. It includes any activity that provides relaxation, interest, enjoyment, and exercise. Recreation provides an outlet for physical, mental, and social energy. It can give a sense of worth and achievement. It can help you stay healthy.    Mental Exercise and Social Involvement  Mental and emotional health is as important as physical health. Keep in touch with friends and family. Stay as active as possible. Continue to learn and challenge yourself.   Things you can do to stay mentally active are:  Learn something new, like a foreign language or  musical instrument.   Play SCRABBLE or do crossword puzzles. If you cannot find people to play these games with you at home, you can play them with others on your computer through the Internet.   Join a games club--anything from card games to chess or checkers or lawn bowling.   Start a new hobby.   Go back to school.   Volunteer.   Read.   Keep up with world events.    Exercise for a Healthier Heart  You may wonder how you can improve the health of your heart. If you re thinking about exercise, you re on the right track. You don t need to become an athlete. But you do need a certain amount of brisk exercise to help strengthen your heart. If you have been diagnosed with a heart condition, your healthcare provider may advise exercise to help stabilize your condition. To help make exercise a habit, choose safe, fun activities.      Exercise with a friend. When activity is fun, you're more likely to stick with it.   Before you start  Check with your healthcare provider before starting an exercise program. This is especially important if you have not been active for a while. It's also important if you have a long-term (chronic) health problem such as heart disease, diabetes, or obesity. Or if you are at high risk for having these problems.   Why exercise?  Exercising regularly offers many healthy rewards. It can help you do all of the following:   Improve your blood cholesterol level to help prevent further heart trouble  Lower your blood pressure to help prevent a stroke or heart attack  Control diabetes, or reduce your risk of getting this disease  Improve your heart and lung function  Reach and stay at a healthy weight  Make your muscles stronger so you can stay active  Prevent falls and fractures by slowing the loss of bone mass (osteoporosis)  Manage stress better  Reduce your blood pressure  Improve your sense of self and your body image  Exercise tips    Ease into your routine. Set small goals. Then build on  them. If you are not sure what your activity level should be, talk with your healthcare provider first before starting an exercise routine.  Exercise on most days. Aim for a total of 150 minutes (2 hours and 30 minutes) or more of moderate-intensity aerobic activity each week. Or 75 minutes (1 hour and 15 minutes) or more of vigorous-intensity aerobic activity each week. Or try for a combination of both. Moderate activity means that you breathe heavier and your heart rate increases but you can still talk. Think about doing 40 minutes of moderate exercise, 3 to 4 times a week. For best results, activity should last for about 40 minutes to lower blood pressure and cholesterol. It's OK to work up to the 40-minute period over time. Examples of moderate-intensity activity are walking 1 mile in 15 minutes. Or doing 30 to 45 minutes of yard work.  Step up your daily activity level.  Along with your exercise program, try being more active the whole day. Walk instead of drive. Or park further away so that you take more steps each day. Do more household tasks or yard work. You may not be able to meet the advised mount of physical activity. But doing some moderate- or vigorous-intensity aerobic activity can help reduce your risk for heart disease. Your healthcare provider can help you figure out what is best for you.  Choose 1 or more activities you enjoy.  Walking is one of the easiest things you can do. You can also try swimming, riding a bike, dancing, or taking an exercise class.    When to call your healthcare provider  Call your healthcare provider if you have any of these:   Chest pain or feel dizzy or lightheaded  Burning, tightness, pressure, or heaviness in your chest, neck, shoulders, back, or arms  Abnormal shortness of breath  More joint or muscle pain  A very fast or irregular heartbeat (palpitations)  [email or web address] last reviewed this educational content on 7/1/2019 2000-2021 [employer]. All rights  reserved. This information is not intended as a substitute for professional medical care. Always follow your healthcare professional's instructions.          Understanding USDA MyPlate  The USDA has guidelines to help you make healthy food choices. These are called MyPlate. MyPlate shows the food groups that make up healthy meals using the image of a place setting. Before you eat, think about the healthiest choices for what to put on your plate or in your cup or bowl. To learn more about building a healthy plate, visit www.choosemyplate.gov.    The food groups  Fruits. Any fruit or 100% fruit juice counts as part of the Fruit Group. Fruits may be fresh, canned, frozen, or dried, and may be whole, cut-up, or pureed. Make 1/2 of your plate fruits and vegetables.  Vegetables. Any vegetable or 100% vegetable juice counts as a member of the Vegetable Group. Vegetables may be fresh, frozen, canned, or dried. They can be served raw or cooked and may be whole, cut-up, or mashed. Make 1/2 of your plate fruits and vegetables.  Grains. All foods made from grains are part of the Grains Group. These include wheat, rice, oats, cornmeal, and barley. Grains are often used to make foods such as bread, pasta, oatmeal, cereal, tortillas, and grits. Grains should be no more than 1/4 of your plate. At least half of your grains should be whole grains.  Protein. This group includes meat, poultry, seafood, beans and peas, eggs, processed soy products (such as tofu), nuts (including nut butters), and seeds. Make protein choices no more than 1/4 of your plate. Meat and poultry choices should be lean or low fat.  Dairy. The Dairy Group includes all fluid milk products and foods made from milk that contain calcium, such as yogurt and cheese. (Foods that have little calcium, such as cream, butter, and cream cheese, are not part of this group.) Most dairy choices should be low-fat or fat-free.  Oils. Oils aren't a food group, but they do contain  essential nutrients. However it's important to watch your intake of oils. These are fats that are liquid at room temperature. They include canola, corn, olive, soybean, vegetable, and sunflower oil. Foods that are mainly oil include mayonnaise, certain salad dressings, and soft margarines. You likely already get your daily oil allowance from the foods you eat.  Things to limit  Eating healthy also means limiting these things in your diet:     Salt (sodium). Many processed foods have a lot of sodium. To keep sodium intake down, eat fresh vegetables, meats, poultry, and seafood when possible. Purchase low-sodium, reduced-sodium, or no-salt-added food products at the store. And don't add salt to your meals at home. Instead, season them with herbs and spices such as dill, oregano, cumin, and paprika. Or try adding flavor with lemon or lime zest and juice.  Saturated fat. Saturated fats are most often found in animal products such as beef, pork, and chicken. They are often solid at room temperature, such as butter. To reduce your saturated fat intake, choose leaner cuts of meat and poultry. And try healthier cooking methods such as grilling, broiling, roasting, or baking. For a simple lower-fat swap, use plain nonfat yogurt instead of mayonnaise when making potato salad or macaroni salad.  Added sugars. These are sugars added to foods. They are in foods such as ice cream, candy, soda, fruit drinks, sports drinks, energy drinks, cookies, pastries, jams, and syrups. Cut down on added sugars by sharing sweet treats with a family member or friend. You can also choose fruit for dessert, and drink water or other unsweetened beverages.     [employer] last reviewed this educational content on 6/1/2020 2000-2021 [employer]. All rights reserved. This information is not intended as a substitute for professional medical care. Always follow your healthcare professional's instructions.        Activities of Daily  Living    Your Health Risk Assessment indicates you have difficulties with activities of daily living such as housework, bathing, preparing meals, taking medication, etc. Please make a follow up appointment for us to address this issue in more detail.    Signs of Hearing Loss      Hearing much better with one ear can be a sign of hearing loss.   Hearing loss is a problem shared by many people. In fact, it is one of the most common health problems, particularly as people age. Most people age 65 and older have some hearing loss. By age 80, almost everyone does. Hearing loss often occurs slowly over the years. So you may not realize your hearing has gotten worse.  Have your hearing checked  Call your healthcare provider if you:  Have to strain to hear normal conversation  Have to watch other people s faces very carefully to follow what they re saying  Need to ask people to repeat what they ve said  Often misunderstand what people are saying  Turn the volume of the television or radio up so high that others complain  Feel that people are mumbling when they re talking to you  Find that the effort to hear leaves you feeling tired and irritated  Notice, when using the phone, that you hear better with one ear than the other  [employer] last reviewed this educational content on 1/1/2020 2000-2021 [employer]. All rights reserved. This information is not intended as a substitute for professional medical care. Always follow your healthcare professional's instructions.          Urinary Incontinence, Female (Adult)   Urinary incontinence means loss of bladder control. This problem affects many women, especially as they get older. If you have incontinence, you may be embarrassed to ask for help. But know that this problem can be treated.   Types of Incontinence  There are different types of incontinence. Two of the main types are described here. You can have more than one type.   Stress incontinence. With this  type, urine leaks when pressure (stress) is put on the bladder. This may happen when you cough, sneeze, or laugh. Stress incontinence most often occurs because the pelvic floor muscles that support the bladder and urethra are weak. This can happen after pregnancy and vaginal childbirth or a hysterectomy. It can also be due to excess body weight or hormone changes.  Urge incontinence (also called overactive bladder). With this type, a sudden urge to urinate is felt often. This may happen even though there may not be much urine in the bladder. The need to urinate often during the night is common. Urge incontinence most often occurs because of bladder spasms. This may be due to bladder irritation or infection. Damage to bladder nerves or pelvic muscles, constipation, and certain medicines can also lead to urge incontinence.  Treatment depends on the cause. Further evaluation is needed to find the type you have. This will likely include an exam and certain tests. Based on the results, you and your healthcare provider can then plan treatment. Until a diagnosis is made, the home care tips below can help ease symptoms.   Home care  Do pelvic floor muscle exercises, if they are prescribed. The pelvic floor muscles help support the bladder and urethra. Many women find that their symptoms improve when doing special exercises that strengthen these muscles. To do the exercises, contract the muscles you would use to stop your stream of urine. But do this when you re not urinating. Hold for 10 seconds, then relax. Repeat 10 to 20 times in a row, at least 3 times a day. Your healthcare provider may give you other instructions for how to do the exercises and how often.  Keep a bladder diary. This helps track how often and how much you urinate over a set period of time. Bring this diary with you to your next visit with the provider. The information can help your provider learn more about your bladder problem.  Lose weight, if  advised to by your provider. Extra weight puts pressure on the bladder. Your provider can help you create a weight-loss plan that s right for you. This may include exercising more and making certain diet changes.  Don't have foods and drinks that may irritate the bladder. These can include alcohol and caffeinated drinks.  Quit smoking. Smoking and other tobacco use can lead to a long-term (chronic) cough that strains the pelvic floor muscles. Smoking may also damage the bladder and urethra. Talk with your provider about treatments or methods you can use to quit smoking.  If drinking large amounts of fluid makes you have symptoms, you may be advised to limit your fluid intake. You may also be advised to drink most of your fluids during the day and to limit fluids at night.  If you re worried about urine leakage or accidents, you may wear absorbent pads to catch urine. Change the pads often. This helps reduce discomfort. It may also reduce the risk of skin or bladder infections.    Follow-up care  Follow up with your healthcare provider, or as directed. It may take some to find the right treatment for your problem. But healthy lifestyle changes can be made right away. These include such things as exercising on a regular basis, eating a healthy diet, losing weight (if needed), and quitting smoking. Your treatment plan may include special therapies or medicines. Certain procedures or surgery may also be options. Talk about any questions you have with your provider.   When to seek medical advice  Call the healthcare provider right away if any of these occur:  Fever of 100.4 F (38 C) or higher, or as directed by your provider  Bladder pain or fullness  Belly swelling  Nausea or vomiting  Back pain  Weakness, dizziness, or fainting  Edu last reviewed this educational content on 1/1/2020 2000-2021 [employer]. All rights reserved. This information is not intended as a substitute for professional  medical care. Always follow your healthcare professional's instructions.        Your Health Risk Assessment indicates you feel you are not in good emotional health.    Recreation   Recreation is not limited to sports and team events. It includes any activity that provides relaxation, interest, enjoyment, and exercise. Recreation provides an outlet for physical, mental, and social energy. It can give a sense of worth and achievement. It can help you stay healthy.    Mental Exercise and Social Involvement  Mental and emotional health is as important as physical health. Keep in touch with friends and family. Stay as active as possible. Continue to learn and challenge yourself.   Things you can do to stay mentally active are:  Learn something new, like a foreign language or musical instrument.   Play SCRABBLE or do crossword puzzles. If you cannot find people to play these games with you at home, you can play them with others on your computer through the Internet.   Join a games club--anything from card games to chess or checkers or lawn bowling.   Start a new hobby.   Go back to school.   Volunteer.   Read.   Keep up with world events.    Depression and Suicide in Older Adults    Nearly 2 million older Americans have some type of depression. Some of them even take their own lives. Yet depression among older adults is often ignored. Learn the warning signs. You may help spare a loved one needless pain. You may also save a life.   What is depression?  Depression is a common and serious illness that affects the way you think and feel. It is not a normal part of aging, nor is it a sign of weakness, a character flaw, or something you can snap out of. Most people with depression need treatment to get better. The most common symptom is a feeling of deep sadness. People who are depressed also may seem tired and listless. And nothing seems to give them pleasure. It s normal to grieve or be sad sometimes. But sadness lessens or  "passes with time. Depression rarely goes away or improves on its own. A person with clinical depression can't \"snap out of it.\" Other symptoms of depression are:   Sleeping more or less than normal  Eating more or less than normal  Having headaches, stomachaches, or other pains that don t go away  Feeling nervous,  empty,  or worthless  Crying a great deal  Thinking or talking about suicide or death  Loss of interest in activities previously enjoyed  Social isolation  Feeling confused or forgetful  What causes it?  The causes of depression aren t fully known. But it is thought to result from a complex blend of these factors:   Biochemistry. Certain chemicals in the brain play a role.  Genes. Depression does run in families.  Life stress. Life stresses can also trigger depression in some people. Older adults often face many stressors, such as death of friends or a spouse, health problems, and financial concerns.  Chronic conditions. This includes conditions such as diabetes, heart disease, or cancer. These can cause symptoms of depression. Medicine side effects can cause changes in thoughts and behaviors.  How you can help  Often, depressed people may not want to ask for help. When they do, they may be ignored. Or, they may receive the wrong treatment. You can help by showing parents and older friends love and support. If they seem depressed, don t lecture the person, ignore the symptoms, or discount the symptoms as a  normal  part of aging -which they are not. Get involved, listen, and show interest and support.   Help them understand that depression is a treatable illness. Tell them you can help them find the right treatment. Offer to go to their healthcare provider's appointment with them for support when the symptoms are discussed. With their approval, contact a local mental health center, social service agency, or hospital about services.   You can be an advocate for him or her at healthcare appointments. Many " older adults have chronic illnesses that can cause symptoms of depression. Medicine side effects can change thoughts and behaviors. You can help make sure that the healthcare provider looks at all of these factors. He or she should refer your family member or friend to a mental healthcare provider when needed. in some cases, untreated depression can lead to a misdiagnosis. A person may be diagnosed with a brain disorder such as dementia. If the healthcare provider does not take the issue of depression seriously, help your family member or friend to find another provider.   Don't be afraid to ask  If you think an older person you care about could be suicidal, ask,  Have you thought about suicide?  Most people will tell you the truth. If they say  yes,  they may already have a plan for how and when they will attempt it. Find out as much as you can. The more detailed the plan, and the easier it is to carry out, the more danger the person is in right now. Tell the person you are there for them and do not want them to harm him or herself. Don't wait to get help for the person. Call the person's healthcare provider, local hospital, or emergency services.   To learn more  National Suicide Prevention Lifeline (crisis hotline) 069-326-TOHM (659-020-6736)  National [place] of Mental Pfiwvr852-273-5764kgh.nimh.nih.gov  National [place] on Mental Pawgahn609-995-4278cdt.aleena.org  Mental Health Cjjknff802-838-4055wnd.[facility].org  National Suicide Doeohzd160-MVOVVGS (774-775-7705)    Call 911  Never leave the person alone. A person who is actively suicidal needs psychiatric care right away. They will need constant supervision. Never leave the person out of sight. Call 911 or the national 24-hour suicide crisis hotline at 881-104-FNKL (620-305-9644). You can also take the person to the closest emergency room.   Edu last reviewed this educational content on 5/1/2020 2000-2021 [employer]. All rights reserved.  This information is not intended as a substitute for professional medical care. Always follow your healthcare professional's instructions.           Lung Cancer Screening   Frequently Asked Questions  If you are at high-risk for lung cancer, getting screened with low-dose computed tomography (LDCT) every year can help save your life. This handout offers answers to some of the most common questions about lung cancer screening. If you have other questions, please call [phone] ([phone]).     What is it?  Lung cancer screening uses special X-ray technology to create an image of your lung tissue. The exam is quick and easy and takes less than 10 seconds. We don t give you any medicine or use any needles. You can eat before and after the exam. You don t need to change your clothes as long as the clothing on your chest doesn t contain metal. But, you do need to be able to hold your breath for at least 6 seconds during the exam.    What is the goal of lung cancer screening?  The goal of lung cancer screening is to save lives. Many times, lung cancer is not found until a person starts having physical symptoms. Lung cancer screening can help detect lung cancer in the earliest stages when it may be easier to treat.    Who should be screened for lung cancer?  We suggest lung cancer screening for anyone who is at high-risk for lung cancer. You are in the high-risk group if you:     are between the ages of 55 and 79, and   have smoked at least 1 pack of cigarettes a day for 20 or more years, and   still smoke or have quit within the past 15 years.    However, if you have a new cough or shortness of breath, you should talk to your doctor before being screened.    Why does it matter if I have symptoms?  Certain symptoms can be a sign that you have a condition in your lungs that should be checked and treated by your doctor. These symptoms include fever, chest pain, a new or changing cough, shortness of breath that  you have never felt before, coughing up blood or unexplained weight loss. Having any of these symptoms can greatly affect the results of lung cancer screening.       Should all smokers get an LDCT lung cancer screening exam?  It depends. Lung cancer screening is for a very specific group of men and women who have a history of heavy smoking over a long period of time (see  Who should be screened for lung cancer  above).  I am in the high-risk group, but have been diagnosed with cancer in the past. Is LDCT lung cancer screening right for me?  In some cases, you should not have LDCT lung screening, such as when your doctor is already following your cancer with CT scan studies. Your doctor will help you decide if LDCT lung screening is right for you.  Do I need to have a screening exam every year?  Yes. If you are in the high-risk group described earlier, you should get an LDCT lung cancer screening exam every year until you are 79, or are no longer willing or able to undergo screening and possible procedures to diagnose and treat lung cancer.  How effective is LDCT at preventing death from lung cancer?  Studies have shown that LDCT lung cancer screening can lower the risk of death from lung cancer by 20 percent in people who are at high-risk.  What are the risks?  There are some risks and limitations of LDCT lung cancer screening. We want to make sure you understand the risks and benefits, so please let us know if you have any questions. Your doctor may want to talk with you more about these risks.   Radiation exposure: As with any exam that uses radiation, there is a very small increased risk of cancer. The amount of radiation in LDCT is small--about the same amount a person would get from a mammogram. Your doctor orders the exam when he or she feels the potential benefits outweigh the risks.   False negatives: No test is perfect, including LDCT. It is possible that you may have a medical condition, including lung  cancer, that is not found during your exam. This is called a false negative result.   False positives and more testing: LDCT very often finds something in the lung that could be cancer, but in fact is not. This is called a false positive result. False positive tests often cause anxiety. To make sure these findings are not cancer, you may need to have more tests. These tests will be done only if you give us permission. Sometimes patients need a treatment that can have side effects, such as a biopsy. For more information on false positives, see  What can I expect from the results?    Findings not related to lung cancer: Your LDCT exam also takes pictures of areas of your body next to your lungs. In a very small number of cases, the CT scan will show an abnormal finding in one of these areas, such as your kidneys, adrenal glands, liver or thyroid. This finding may not be serious, but you may need more tests. Your doctor can help you decide what other tests you may need, if any.  What can I expect from the results?  About 1 out of 4 LDCT exams will find something that may need more tests. Most of the time, these findings are lung nodules. Lung nodules are very small collections of tissue in the lung. These nodules are very common, and the vast majority--more than 97 percent--are not cancer (benign). Most are normal lymph nodes or small areas of scarring from past infections.  But, if a small lung nodule is found to be cancer, the cancer can be cured more than 90 percent of the time. To know if the nodule is cancer, we may need to get more images before your next yearly screening exam. If the nodule has suspicious features (for example, it is large, has an odd shape or grows over time), we will refer you to a specialist for further testing.  Will my doctor also get the results?  Yes. Your doctor will get a copy of your results.  Is it okay to keep smoking now that there s a cancer screening exam?  No. Tobacco is one of  the strongest cancer-causing agents. It causes not only lung cancer, but other cancers and cardiovascular (heart) diseases as well. The damage caused by smoking builds over time. This means that the longer you smoke, the higher your risk of disease. While it is never too late to quit, the sooner you quit, the better.  Where can I find help to quit smoking?  The best way to prevent lung cancer is to stop smoking. If you have already quit smoking, congratulations and keep it up! For help on quitting smoking, please call [facility] at 9-198-QUITNOW (1-774.630.7818) or the [employer] at 1-697.229.7268 to find local resources near you.  One-on-one health coaching:  If you d prefer to work individually with a health care provider on tobacco cessation, we offer:     Medication Therapy Management:  Our specially trained pharmacists work closely with you and your doctor to help you quit smoking.  Call [phone] or [phone] (toll free).

## 2022-12-14 NOTE — PROGRESS NOTES
"SUBJECTIVE:   Catina is a 62 year old who presents for Preventive Visit.      Are you in the first 12 months of your Medicare coverage?  Maribel    Continues to struggle with chronic daily headache.  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  This is also impacting her memory significantly though she has no difficulty in recalling events today.  She has a history of type 2 diabetes, she last had her A1c checked 2 years ago.  She remains compliant with metformin.  She does not check her sugars.  Admits that she has been exercising and that her diet more recently has been poor.  History of coronary artery disease with myocardial infarction in 2018, most recent visit with Dr. Mabry within normal stress test other than previous MI, no acute changes.  Her ejection fraction was borderline at 50%.  She remains on losartan, furosemide, aspirin, and Toprol-XL.  She is taking atorvastatin and Zetia and management of her dyslipidemia.  History of obstructive sleep apnea, did not tolerate CPAP and so has not been using.  She is a history of tobacco use, described 30 pack years, she is interested in lung cancer screening.  She is no longer smoking, quit in 2019.  She is no longer having difficulties with wheezing and therefore is no longer taking Advair.  She is had some increasing difficulty with pain in her right outer hip, increasingly disruptive of rest and walking, interested in further evaluation.  Tylenol is somewhat helpful.    Healthy Habits:     In general, how would you rate your overall health?  Poor    Frequency of exercise:  None    Do you usually eat at least 4 servings of fruit and vegetables a day, include whole grains    & fiber and avoid regularly eating high fat or \"junk\" foods?  No    Taking medications regularly:  No    Barriers to taking medications:  Problems remembering to take them    Ability to successfully perform activities of daily living:  Transportation requires " assistance, housework requires assistance and laundry requires assistance    Home Safety:  No safety concerns identified    Hearing Impairment:  Difficulty understanding speech on the telephone    In the past 6 months, have you been bothered by leaking of urine? Yes    In general, how would you rate your overall mental or emotional health?  Poor      PHQ-2 Total Score: 1    Additional concerns today:  Yes      Have you ever done Advance Care Planning? (For example, a Health Directive, POLST, or a discussion with a medical provider or your loved ones about your wishes): No, advance care planning information given to patient to review.  Patient plans to discuss their wishes with loved ones or provider.         Fall risk   She denies falls in the past year or fear of falling.    Cognitive Screening   1) Repeat 3 items (Leader, Season, Table)    2) Clock draw: Normal  3) 3 item recall: Recalls 3 objects  Results: 3 items recalled: COGNITIVE IMPAIRMENT LESS LIKELY    Mini-CogTM Copyright S Lovely. Licensed by the author for use in [facility]; reprinted with permission (soob@[facility]). All rights reserved.      Do you have sleep apnea, excessive snoring or daytime drowsiness?: Known sleep apnea, untreated    Reviewed and updated as needed this visit by clinical staff    Allergies  Meds              Reviewed and updated as needed this visit by Provider                 Social History     Tobacco Use     Smoking status: Former     Packs/day: 0.10     Years: 20.00     Pack years: 2.00     Types: Cigarettes     Smokeless tobacco: Never   Substance Use Topics     Alcohol use: No     Comment: Alcoholic Drinks/day: h/o alcohol abuse          Alcohol Use 12/13/2022   Prescreen: >3 drinks/day or >7 drinks/week? Not Applicable         Current providers sharing in care for this patient include:   Patient Care Team:  Lucretia Raya MD as PCP - General  Lucretia Raya MD as Assigned PCP  Roberto Mabry MD as  Assigned Heart and Vascular Provider    The following health maintenance items are reviewed in Epic and correct as of today:  Health Maintenance   Topic Date Due     DIABETIC FOOT EXAM  Never done     ANNUAL REVIEW OF HM ORDERS  Never done     EYE EXAM  Never done     MAMMO SCREENING  Never done     Pneumococcal Vaccine: Pediatrics (0 to 5 Years) and At-Risk Patients (6 to 64 Years) (1 - PCV) Never done     COLORECTAL CANCER SCREENING  Never done     HIV SCREENING  Never done     HEPATITIS C SCREENING  Never done     PAP  Never done     DTAP/TDAP/TD IMMUNIZATION (1 - Tdap) 08/27/2005     ZOSTER IMMUNIZATION (1 of 2) Never done     LUNG CANCER SCREENING  10/30/2020     A1C  04/12/2021     MEDICARE ANNUAL WELLNESS VISIT  10/12/2021     MICROALBUMIN  10/12/2021     BMP  04/28/2023     LIPID  04/28/2023     ADVANCE CARE PLANNING  10/12/2025     PHQ-2 (once per calendar year)  Completed     INFLUENZA VACCINE  Completed     COVID-19 Vaccine  Completed     IPV IMMUNIZATION  Aged Out     MENINGITIS IMMUNIZATION  Aged Out     Lab work is in process  Labs reviewed in EPIC  BP Readings from Last 3 Encounters:   12/14/22 120/72   04/28/22 100/70   09/02/21 118/68    Wt Readings from Last 3 Encounters:   12/14/22 95.2 kg (209 lb 12.8 oz)   04/28/22 95.9 kg (211 lb 6.4 oz)   09/02/21 90.3 kg (199 lb)                  Patient Active Problem List   Diagnosis     Delusional disorder (H)     Mixed hyperlipidemia     Chronic pain syndrome     Obesity (BMI 35.0-39.9) with comorbidity (H)     Type 2 diabetes mellitus with complication, without long-term current use of insulin (H)     Chronic HFrEF (heart failure with reduced ejection fraction) (H)     History of tobacco abuse     ERNIE (obstructive sleep apnea)     Coronary artery disease involving native coronary artery of native heart without angina pectoris     Past Surgical History:   Procedure Laterality Date     ANKLE FRACTURE SURGERY       CV CORONARY ANGIOGRAM N/A 10/31/2019     Procedure: Coronary Angiogram;  Surgeon: Chip Escalera MD;  Location: [facility];  Service: Cardiology       Social History     Tobacco Use     Smoking status: Former     Packs/day: 0.10     Years: 20.00     Pack years: 2.00     Types: Cigarettes     Smokeless tobacco: Never   Substance Use Topics     Alcohol use: No     Comment: Alcoholic Drinks/day: h/o alcohol abuse      Family History   Problem Relation Age of Onset     Hypertension Mother      Heart Failure Mother      Dementia Mother      Heart Disease Mother      Hypertension Father      Heart Disease Father      Diabetes Father      Emphysema Father      Heart Disease Sister 58.00        4 vessel CBAG         Current Outpatient Medications   Medication Sig Dispense Refill     acetaminophen (TYLENOL) 500 MG tablet [ACETAMINOPHEN (TYLENOL) 500 MG TABLET] Take 500 mg by mouth as needed for pain.       ADVAIR DISKUS 250-50 mcg/dose DISKUS [ADVAIR DISKUS 250-50 MCG/DOSE DISKUS] INHALE ONE PUFF BY MOUTH TWICE A DAY (Patient taking differently: as needed) 60 each 3     aspirin 81 mg chewable tablet [ASPIRIN 81 MG CHEWABLE TABLET] Chew 1 tablet (81 mg total) daily. (Patient taking differently: Take 162 mg by mouth daily Taking 2 81mg tablets) 30 tablet 0     atorvastatin (LIPITOR) 80 MG tablet TAKE ONE TABLET BY MOUTH AT BEDTIME 90 tablet 2     blood glucose (CONTOUR NEXT TEST) test strip 1 strip by In Vitro route daily 100 strip 3     blood-glucose meter (CONTOUR NEXT METER) Misc [BLOOD-GLUCOSE METER (CONTOUR NEXT METER) MISC] Use 1 each As Directed daily. 1 each 0     ezetimibe (ZETIA) 10 MG tablet TAKE ONE TABLET BY MOUTH EVERY DAY 90 tablet 1     furosemide (LASIX) 20 MG tablet Take 1 tablet (20 mg) by mouth daily 90 tablet 1     Lancets (ONETOUCH DELICA PLUS [id number]) MISC USE TO TEST BLOOD SUGAR ONCE A  each 3     losartan (COZAAR) 25 MG tablet Take 1 tablet (25 mg) by mouth daily 90 tablet 0     metFORMIN (GLUCOPHAGE XR) 500 MG 24 hr  tablet TAKE FOUR TABLETS BY MOUTH EVERY DAY WITH DINNER 360 tablet 1     metoprolol succinate ER (TOPROL XL) 25 MG 24 hr tablet Take 0.5 tablets (12.5 mg) by mouth daily 45 tablet 0     miscellaneous medical supply (BLOOD PRESSURE CUFF) Misc [MISCELLANEOUS MEDICAL SUPPLY (BLOOD PRESSURE CUFF) MISC] Pt to take BP daily. Pt states needs average size cuff. 1 each 1     nitroGLYcerin (NITROSTAT) 0.4 MG sublingual tablet Place 1 tablet (0.4 mg) under the tongue every 5 minutes as needed for chest pain 25 tablet 4     ONETOUCH DELICA PLUS LANCET 33 gauge Misc [ONETOUCH DELICA PLUS LANCET 33 GAUGE MISC] USE TO TEST BLOOD SUGAR ONCE DAILY 100 each 3     senna (SENOKOT) 8.6 mg tablet [SENNA (SENOKOT) 8.6 MG TABLET] Take 1 tablet by mouth daily as needed for constipation.       No Known Allergies  Recent Labs   Lab Test 12/14/22  1450 04/28/22  1513 06/04/21  1100 10/12/20  0950 10/12/20  0928 04/23/20  1446   A1C 7.1*  --   --   --  7.3* 7.5*   LDL  --  87 71  --  86  --    HDL  --  42* 35*  --  38*  --    TRIG  --  161* 145  --  151*  --    ALT  --  14 18  --  21 47*   CR  --  0.77 0.95  --  0.87 0.82   GFRESTIMATED  --  88 60*  --  >60 >60   GFRESTBLACK  --   --  >60  --  >60 >60   POTASSIUM  --  4.6 3.7  --  4.9 4.4   TSH  --   --   --  2.76  --   --         FHS-7:   Breast CA Risk Assessment (FHS-7) 12/14/2022   Did any of your first-degree relatives have breast or ovarian cancer? Unknown   Did any of your relatives have bilateral breast cancer? Unknown   Did any man in your family have breast cancer? No   Did any woman in your family have breast and ovarian cancer? Unknown   Did any woman in your family have breast cancer before age 50 y? Unknown   Do you have 2 or more relatives with breast and/or ovarian cancer? Unknown   Do you have 2 or more relatives with breast and/or bowel cancer? Unknown     Pertinent mammograms are reviewed under the imaging tab.    Review of Systems   Constitutional: Negative for chills and  "fever.   HENT: Positive for congestion. Negative for ear pain, hearing loss and sore throat.    Eyes: Negative for pain and visual disturbance.   Respiratory: Positive for cough and shortness of breath.    Cardiovascular: Negative for chest pain, palpitations and peripheral edema.   Gastrointestinal: Positive for constipation, diarrhea and nausea. Negative for abdominal pain, heartburn and hematochezia.   Breasts:  Negative for tenderness, breast mass and discharge.   Genitourinary: Positive for frequency, pelvic pain and urgency. Negative for dysuria, genital sores, hematuria, vaginal bleeding and vaginal discharge.   Musculoskeletal: Positive for arthralgias and myalgias. Negative for joint swelling.   Skin: Negative for rash.   Neurological: Positive for headaches. Negative for dizziness, weakness and paresthesias.   Psychiatric/Behavioral: Negative for mood changes. The patient is not nervous/anxious.          OBJECTIVE:   /72   Pulse 102   Temp 98  F (36.7  C)   Resp 16   Ht 1.651 m (5' 5\")   Wt 95.2 kg (209 lb 12.8 oz)   SpO2 98%   BMI 34.91 kg/m   Estimated body mass index is 34.91 kg/m  as calculated from the following:    Height as of this encounter: 1.651 m (5' 5\").    Weight as of this encounter: 95.2 kg (209 lb 12.8 oz).  Physical Exam  GENERAL APPEARANCE: healthy, alert and no distress  EYES: Eyes grossly normal to inspection, PERRL and conjunctivae and sclerae normal  HENT: ear canals and TM's normal, nose and mouth without ulcers or lesions, oropharynx clear and oral mucous membranes moist  NECK: no adenopathy, no asymmetry, masses, or scars and thyroid normal to palpation  RESP: lungs clear to auscultation - no rales, rhonchi or wheezes  BREAST: She has poorly defined increased density symmetrically in bilateral upper outer quadrants, I would estimate to be about 4 to 5 cm in diameter.  No overlying skin changes., tenderness or nipple discharge and no palpable axillary masses or " adenopathy  CV: regular rate and rhythm, normal S1 S2, no S3 or S4, no murmur, click or rub, no peripheral edema and peripheral pulses strong  ABDOMEN: soft, nontender, no hepatosplenomegaly, no masses and bowel sounds normal  MS: no musculoskeletal defects are noted and gait is age appropriate without ataxia  SKIN: no suspicious lesions or rashes  NEURO: Normal strength and tone, sensory exam grossly normal, mentation intact and speech normal  PSYCH: mentation appears normal and affect flattened      ASSESSMENT / PLAN:     Medicare annual wellness visit  At today's visit, we discussed lifestyle interventions to promote self-management and wellness, including maintenance of a healthy weight, healthy diet, regular physical activity and exercise, and falls prevention. Discussed PCV 20, Tdap, and Shingrix, she declines today.  She is agreeable to HIV and hepatitis C screening.  She is agreeable to lipid follow-up.  She declines colon cancer screening.  She declines mammogram.  She declines Pap smear screening.  Information provided regarding [facility] [place] directive.    Visit for screening mammogram  Bilateral breast mass  She declines breast cancer screening.  She is agreeable to diagnostic mammogram and ultrasound in the setting of bilateral breast masses.    Screen for colon cancer  We discussed options.  She adamantly declines colonoscopy and therefore also declines Cologuard and FIT testing    Screening for HIV (human immunodeficiency virus)  She is agreeable to HIV screening today.  - HIV Antigen Antibody Combo; Future  - HIV Antigen Antibody Combo    Need for hepatitis C screening test  She is agreeable to hepatitis C screening.  - Hepatitis C Screen Reflex to HCV RNA Quant and Genotype; Future  - Hepatitis C Screen Reflex to HCV RNA Quant and Genotype    Cervical cancer screening  We discussed Pap smear screening recommendations, she declines Pap smear.    Type 2 diabetes mellitus with complication, without  long-term current use of insulin (H)  Will obtain A1c, comprehensive metabolic panel, lipids, and urine microalbumin today.  Referral is placed for diabetic eye exam.  - Adult Eye  Referral; Future  - HEMOGLOBIN A1C; Future  - Albumin Random Urine Quantitative with Creat Ratio; Future  - Lipid panel reflex to direct LDL Fasting; Future  - Comprehensive metabolic panel; Future  - HEMOGLOBIN A1C  - Albumin Random Urine Quantitative with Creat Ratio  - Lipid panel reflex to direct LDL Fasting  - Comprehensive metabolic panel    Coronary artery disease involving native coronary artery of native heart without angina pectoris  Encourage continued follow-up with Dr. Mabry of cardiology.  We will check kidney and liver function.  - Comprehensive metabolic panel; Future  - Comprehensive metabolic panel    Chronic HFrEF (heart failure with reduced ejection fraction) (H)  Currently euvolemic, remains on furosemide.  Appreciate cares of cardiology.    Mixed hyperlipidemia  She will continue atorvastatin and Zetia.  We will check fasting lipids today.      ERNIE (obstructive sleep apnea)  She did not tolerate CPAP, declines intervention    Delusional disorder (H)  Delusions persist, she is not interested in further evaluation.  This is not currently interfering with self-cares, safety, etc.    History of tobacco abuse  Describes a 30-pack-year history.  Order placed for screening CT of the chest.  - Prof fee: Shared Decision Making for Lung Cancer Screening  - CT Chest Lung Cancer Scrn Low Dose wo; Future    Trochanteric bursitis of right hip  Reviewed nature condition.  Order placed for orthopedic consultation.  - Orthopedic  Referral; Future    Chronic pain syndrome  Chronic daily headache  This is been chronic.  Disruptive of life activities.  She has not tolerated previous treatment attempts including gabapentin, ibuprofen, oxycodone, topiramate, and duloxetine.  She is taking acetaminophen at maximum doses.   "She is interested in pursuing medical marijuana treatment, orders placed.    Personal history of tobacco use  CT lung cancer screening as above.    Patient has been advised of split billing requirements and indicates understanding: Yes      COUNSELING:  Reviewed preventive health counseling, as reflected in patient instructions      BMI:   Estimated body mass index is 34.91 kg/m  as calculated from the following:    Height as of this encounter: 1.651 m (5' 5\").    Weight as of this encounter: 95.2 kg (209 lb 12.8 oz).   Weight management plan: Discussed healthy diet and exercise guidelines      She reports that she has quit smoking. Her smoking use included cigarettes. She has a 2.00 pack-year smoking history. She has never used smokeless tobacco.      Appropriate preventive services were discussed with this patient, including applicable screening as appropriate for cardiovascular disease, diabetes, osteopenia/osteoporosis, and glaucoma.  As appropriate for age/gender, discussed screening for colorectal cancer, prostate cancer, breast cancer, and cervical cancer. Checklist reviewing preventive services available has been given to the patient.    Reviewed patients plan of care and provided an AVS. The Basic Care Plan (routine screening as documented in Health Maintenance) for Catina meets the Care Plan requirement. This Care Plan has been established and reviewed with the Patient.          Lucretia Raya MD  [facility]    Identified Health Risks:  Answers for HPI/ROS submitted by the patient on 12/13/2022  If you checked off any problems, how difficult have these problems made it for you to do your work, take care of things at home, or get along with other people?: Not difficult at all  PHQ9 TOTAL SCORE: 10        The patient was provided with suggestions to help her develop a healthy physical lifestyle.  She is at risk for lack of exercise and has been provided with information to increase " physical activity for the benefit of her well-being.  The patient was counseled and encouraged to consider modifying their diet and eating habits. She was provided with information on recommended healthy diet options.  The patient reports that she has difficulty with activities of daily living.  She feels she has adequate support and declines assistance.  The patient was provided with written information regarding signs of hearing loss.  Information on urinary incontinence and treatment options given to patient.  The patient was provided with suggestions to help her develop a healthy emotional lifestyle.  The patient s PHQ-9 score is consistent with moderate depression.  She is not interested in further evaluation or treatment of this.    Lung Cancer Screening Shared Decision Making Visit     Catina Acuna, a 62 year old female, is eligible for lung cancer screening    History   Smoking Status     Former     Packs/day: 0.10     Years: 20.00     Types: Cigarettes   Smokeless Tobacco     Never       I have discussed with patient the risks and benefits of screening for lung cancer with low-dose CT.     The risks include:    radiation exposure: one low dose chest CT has as much ionizing radiation as about 15 chest x-rays, or 6 months of background radiation living in [place]      false positives: most findings/nodules are NOT cancer, but some might still require additional diagnostic evaluation, including biopsy    over-diagnosis: some slow growing cancers that might never have been clinically significant will be detected and treated unnecessarily     The benefit of early detection of lung cancer is contingent upon adherence to annual screening or more frequent follow up if indicated.     Furthermore, to benefit from screening, Catina must be willing and able to undergo diagnostic procedures, if indicated. Although no specific guide is available for determining severity of comorbidities, it is reasonable to withhold  screening in patients who have greater mortality risk from other diseases.     We did discuss that the best way to prevent lung cancer is to not smoke.    Some patients may value a numeric estimation of lung cancer risk when evaluating if lung cancer screening is right for them, here is one calculator:    ShouldIScreen

## 2022-12-15 LAB
CREAT UR-MCNC: 185 MG/DL
HCV AB SERPL QL IA: NONREACTIVE
HIV 1+2 AB+HIV1 P24 AG SERPL QL IA: NONREACTIVE
MICROALBUMIN UR-MCNC: 16.9 MG/L
MICROALBUMIN/CREAT UR: 9.14 MG/G CR (ref 0–25)

## 2023-01-16 DIAGNOSIS — E11.8 TYPE 2 DIABETES MELLITUS WITH COMPLICATION, WITHOUT LONG-TERM CURRENT USE OF INSULIN (H): ICD-10-CM

## 2023-01-16 DIAGNOSIS — I25.5 ISCHEMIC CARDIOMYOPATHY: ICD-10-CM

## 2023-01-17 RX ORDER — LOSARTAN POTASSIUM 25 MG/1
25 TABLET ORAL DAILY
Qty: 90 TABLET | Refills: 2 | Status: SHIPPED | OUTPATIENT
Start: 2023-01-17 | End: 2023-11-01

## 2023-01-17 NOTE — TELEPHONE ENCOUNTER
Prescription approved per [facility] Refill Protocol.  SELENA FosterN, RN  [facility]

## 2023-01-30 DIAGNOSIS — I25.5 ISCHEMIC CARDIOMYOPATHY: ICD-10-CM

## 2023-01-30 DIAGNOSIS — I21.4 NON-STEMI (NON-ST ELEVATED MYOCARDIAL INFARCTION) (H): ICD-10-CM

## 2023-01-30 RX ORDER — METOPROLOL SUCCINATE 25 MG/1
12.5 TABLET, EXTENDED RELEASE ORAL DAILY
Qty: 45 TABLET | Refills: 0 | Status: SHIPPED | OUTPATIENT
Start: 2023-01-30 | End: 2023-03-25

## 2023-01-30 NOTE — TELEPHONE ENCOUNTER
"Last Written Prescription Date:  10/31/2022  Last Fill Quantity: 45,  # refills: 0   Last office visit provider:  12/14/2022     Requested Prescriptions   Pending Prescriptions Disp Refills     metoprolol succinate ER (TOPROL XL) 25 MG 24 hr tablet 45 tablet 0     Sig: Take 0.5 tablets (12.5 mg) by mouth daily       Beta-Blockers Protocol Passed - 1/30/2023  2:48 PM        Passed - Blood pressure under 140/90 in past 12 months     BP Readings from Last 3 Encounters:   12/14/22 120/72   04/28/22 100/70   09/02/21 118/68                 Passed - Patient is age 6 or older        Passed - Recent (12 mo) or future (30 days) visit within the authorizing provider's specialty     Patient has had an office visit with the authorizing provider or a provider within the authorizing providers department within the previous 12 mos or has a future within next 30 days. See \"Patient Info\" tab in inbasket, or \"Choose Columns\" in Meds & Orders section of the refill encounter.              Passed - Medication is active on med list             Echo Pacheco RN 01/30/23 3:44 PM      "

## 2023-03-24 DIAGNOSIS — I25.5 ISCHEMIC CARDIOMYOPATHY: ICD-10-CM

## 2023-03-24 DIAGNOSIS — I21.4 NON-STEMI (NON-ST ELEVATED MYOCARDIAL INFARCTION) (H): ICD-10-CM

## 2023-03-25 RX ORDER — METOPROLOL SUCCINATE 25 MG/1
12.5 TABLET, EXTENDED RELEASE ORAL DAILY
Qty: 45 TABLET | Refills: 2 | Status: SHIPPED | OUTPATIENT
Start: 2023-03-25 | End: 2023-05-20

## 2023-03-25 NOTE — TELEPHONE ENCOUNTER
"Last Written Prescription Date:  1/30/2023  Last Fill Quantity: 45,  # refills: 0   Last office visit provider:  12/14/2022     Requested Prescriptions   Pending Prescriptions Disp Refills     metoprolol succinate ER (TOPROL XL) 25 MG 24 hr tablet 45 tablet 0     Sig: Take 0.5 tablets (12.5 mg) by mouth daily       Beta-Blockers Protocol Passed - 3/24/2023 10:09 AM        Passed - Blood pressure under 140/90 in past 12 months     BP Readings from Last 3 Encounters:   12/14/22 120/72   04/28/22 100/70   09/02/21 118/68                 Passed - Patient is age 6 or older        Passed - Recent (12 mo) or future (30 days) visit within the authorizing provider's specialty     Patient has had an office visit with the authorizing provider or a provider within the authorizing providers department within the previous 12 mos or has a future within next 30 days. See \"Patient Info\" tab in inbasket, or \"Choose Columns\" in Meds & Orders section of the refill encounter.              Passed - Medication is active on med list             Jenny Kaminski RN 03/25/23 12:28 PM  "

## 2023-04-06 ENCOUNTER — HOSPITAL ENCOUNTER (OUTPATIENT)
Dept: CT IMAGING | Facility: CLINIC | Age: 63
Discharge: HOME | End: 2023-04-06
Attending: FAMILY MEDICINE
Payer: COMMERCIAL

## 2023-04-06 ENCOUNTER — HOSPITAL ENCOUNTER (OUTPATIENT)
Dept: MAMMOGRAPHY | Facility: CLINIC | Age: 63
Discharge: HOME | End: 2023-04-06
Attending: FAMILY MEDICINE
Payer: COMMERCIAL

## 2023-04-06 DIAGNOSIS — N64.59 OTHER SIGNS AND SYMPTOMS IN BREAST: ICD-10-CM

## 2023-04-06 DIAGNOSIS — N63.20 MASSES OF BOTH BREASTS: ICD-10-CM

## 2023-04-06 DIAGNOSIS — Z87.891 HISTORY OF TOBACCO ABUSE: ICD-10-CM

## 2023-04-06 DIAGNOSIS — N63.10 MASSES OF BOTH BREASTS: ICD-10-CM

## 2023-04-06 PROCEDURE — 71271 CT THORAX LUNG CANCER SCR C-: CPT

## 2023-04-06 PROCEDURE — 76642 ULTRASOUND BREAST LIMITED: CPT | Mod: 50

## 2023-04-06 PROCEDURE — 77062 BREAST TOMOSYNTHESIS BI: CPT

## 2023-04-10 DIAGNOSIS — E11.9 TYPE 2 DIABETES MELLITUS (H): ICD-10-CM

## 2023-04-10 RX ORDER — METFORMIN HCL 500 MG
TABLET, EXTENDED RELEASE 24 HR ORAL
Qty: 360 TABLET | Refills: 3 | Status: SHIPPED | OUTPATIENT
Start: 2023-04-10 | End: 2023-04-13

## 2023-04-13 DIAGNOSIS — E11.9 TYPE 2 DIABETES MELLITUS (H): ICD-10-CM

## 2023-04-13 RX ORDER — METFORMIN HCL 500 MG
2000 TABLET, EXTENDED RELEASE 24 HR ORAL
Qty: 360 TABLET | Refills: 3 | Status: SHIPPED | OUTPATIENT
Start: 2023-04-13 | End: 2024-04-17

## 2023-04-13 RX ORDER — METFORMIN HCL 500 MG
TABLET, EXTENDED RELEASE 24 HR ORAL
Qty: 360 TABLET | Refills: 1 | OUTPATIENT
Start: 2023-04-13

## 2023-04-16 DIAGNOSIS — I25.10 CAD (CORONARY ARTERY DISEASE): ICD-10-CM

## 2023-04-18 RX ORDER — EZETIMIBE 10 MG/1
TABLET ORAL
Qty: 90 TABLET | Refills: 0 | OUTPATIENT
Start: 2023-04-18

## 2023-05-08 DIAGNOSIS — I21.4 NON-STEMI (NON-ST ELEVATED MYOCARDIAL INFARCTION) (H): ICD-10-CM

## 2023-05-08 DIAGNOSIS — E11.8 TYPE 2 DIABETES MELLITUS WITH COMPLICATION, WITHOUT LONG-TERM CURRENT USE OF INSULIN (H): ICD-10-CM

## 2023-05-08 DIAGNOSIS — I25.5 ISCHEMIC CARDIOMYOPATHY: ICD-10-CM

## 2023-05-10 DIAGNOSIS — I25.10 CAD (CORONARY ARTERY DISEASE): ICD-10-CM

## 2023-05-10 RX ORDER — METOPROLOL SUCCINATE 25 MG/1
12.5 TABLET, EXTENDED RELEASE ORAL DAILY
Qty: 45 TABLET | Refills: 2 | OUTPATIENT
Start: 2023-05-10

## 2023-05-10 NOTE — TELEPHONE ENCOUNTER
"Last Written Prescription Date:  2022  Last Fill Quantity: 100,  # refills: 3   Last office visit provider:  2022     Requested Prescriptions   Pending Prescriptions Disp Refills     blood glucose (CONTOUR NEXT TEST) test strip 100 strip 3     Si strip by In Vitro route daily       Diabetic Supplies Protocol Passed - 5/10/2023  7:45 AM        Passed - Medication is active on med list        Passed - Patient is 18 years of age or older        Passed - Recent (6 mo) or future (30 days) visit within the authorizing provider's specialty     Patient had office visit in the last 6 months or has a visit in the next 30 days with authorizing provider.  See \"Patient Info\" tab in inbasket, or \"Choose Columns\" in Meds & Orders section of the refill encounter.          Last Written Prescription Date:  3/25/2023  Last Fill Quantity: 100,  # refills: 3   Last office visit provider:  2022     metoprolol succinate ER (TOPROL XL) 25 MG 24 hr tablet 45 tablet 2     Sig: Take 0.5 tablets (12.5 mg) by mouth daily       Beta-Blockers Protocol Passed - 5/10/2023  7:45 AM        Passed - Blood pressure under 140/90 in past 12 months     BP Readings from Last 3 Encounters:   22 120/72   22 100/70   21 118/68                 Passed - Patient is age 6 or older        Passed - Recent (12 mo) or future (30 days) visit within the authorizing provider's specialty     Patient has had an office visit with the authorizing provider or a provider within the authorizing providers department within the previous 12 mos or has a future within next 30 days. See \"Patient Info\" tab in inbasket, or \"Choose Columns\" in Meds & Orders section of the refill encounter.              Passed - Medication is active on med list             Aleena Serna RN 05/10/23 7:45 AM  "

## 2023-05-10 NOTE — TELEPHONE ENCOUNTER
"Routing refill request to provider for review/approval because:  Should already have refills on file    Last Written Prescription Date:  3/25/2023  Last Fill Quantity: 100,  # refills: 3   Last office visit provider:  2022    Requested Prescriptions   Pending Prescriptions Disp Refills     metoprolol succinate ER (TOPROL XL) 25 MG 24 hr tablet 45 tablet 2     Sig: Take 0.5 tablets (12.5 mg) by mouth daily       Beta-Blockers Protocol Passed - 5/10/2023  7:45 AM        Passed - Blood pressure under 140/90 in past 12 months     BP Readings from Last 3 Encounters:   22 120/72   22 100/70   21 118/68                 Passed - Patient is age 6 or older        Passed - Recent (12 mo) or future (30 days) visit within the authorizing provider's specialty     Patient has had an office visit with the authorizing provider or a provider within the authorizing providers department within the previous 12 mos or has a future within next 30 days. See \"Patient Info\" tab in inbasket, or \"Choose Columns\" in Meds & Orders section of the refill encounter.              Passed - Medication is active on med list         Signed Prescriptions Disp Refills    blood glucose (CONTOUR NEXT TEST) test strip 100 strip 1     Si strip by In Vitro route daily       Diabetic Supplies Protocol Passed - 5/10/2023  7:45 AM        Passed - Medication is active on med list        Passed - Patient is 18 years of age or older        Passed - Recent (6 mo) or future (30 days) visit within the authorizing provider's specialty     Patient had office visit in the last 6 months or has a visit in the next 30 days with authorizing provider.  See \"Patient Info\" tab in inbasket, or \"Choose Columns\" in Meds & Orders section of the refill encounter.                 Aleena Serna RN 05/10/23 7:49 AM  "

## 2023-05-11 RX ORDER — EZETIMIBE 10 MG/1
TABLET ORAL
Qty: 90 TABLET | Refills: 3 | Status: SHIPPED | OUTPATIENT
Start: 2023-05-11 | End: 2024-04-29

## 2023-05-16 DIAGNOSIS — I25.5 ISCHEMIC CARDIOMYOPATHY: ICD-10-CM

## 2023-05-16 DIAGNOSIS — I21.4 NON-STEMI (NON-ST ELEVATED MYOCARDIAL INFARCTION) (H): ICD-10-CM

## 2023-05-16 RX ORDER — METOPROLOL SUCCINATE 25 MG/1
12.5 TABLET, EXTENDED RELEASE ORAL DAILY
Qty: 45 TABLET | Refills: 2 | OUTPATIENT
Start: 2023-05-16

## 2023-05-16 NOTE — TELEPHONE ENCOUNTER
"REFUSED: should have refills on file    Last Written Prescription Date:  3/25/23  Last Fill Quantity: 45,  # refills: 2   Last office visit provider:   12/14/22    Requested Prescriptions   Pending Prescriptions Disp Refills     metoprolol succinate ER (TOPROL XL) 25 MG 24 hr tablet 45 tablet 2     Sig: Take 0.5 tablets (12.5 mg) by mouth daily       Beta-Blockers Protocol Passed - 5/16/2023  4:01 PM        Passed - Blood pressure under 140/90 in past 12 months     BP Readings from Last 3 Encounters:   12/14/22 120/72   04/28/22 100/70   09/02/21 118/68                 Passed - Patient is age 6 or older        Passed - Recent (12 mo) or future (30 days) visit within the authorizing provider's specialty     Patient has had an office visit with the authorizing provider or a provider within the authorizing providers department within the previous 12 mos or has a future within next 30 days. See \"Patient Info\" tab in inbasket, or \"Choose Columns\" in Meds & Orders section of the refill encounter.              Passed - Medication is active on med list             CHIRAG TRAN RN 05/16/23 4:01 PM  "
Stable

## 2023-05-19 DIAGNOSIS — I25.5 ISCHEMIC CARDIOMYOPATHY: ICD-10-CM

## 2023-05-19 DIAGNOSIS — I21.4 NON-STEMI (NON-ST ELEVATED MYOCARDIAL INFARCTION) (H): ICD-10-CM

## 2023-05-20 RX ORDER — METOPROLOL SUCCINATE 25 MG/1
12.5 TABLET, EXTENDED RELEASE ORAL DAILY
Qty: 45 TABLET | Refills: 2 | Status: SHIPPED | OUTPATIENT
Start: 2023-05-20 | End: 2024-05-31

## 2023-05-20 NOTE — TELEPHONE ENCOUNTER
"Last Written Prescription Date:  3/25/23  Last Fill Quantity: 45,  # refills: 2   Last office visit provider:  12/14/22     Requested Prescriptions   Pending Prescriptions Disp Refills     metoprolol succinate ER (TOPROL XL) 25 MG 24 hr tablet 45 tablet 2     Sig: Take 0.5 tablets (12.5 mg) by mouth daily       Beta-Blockers Protocol Passed - 5/19/2023 12:28 PM        Passed - Blood pressure under 140/90 in past 12 months     BP Readings from Last 3 Encounters:   12/14/22 120/72   04/28/22 100/70   09/02/21 118/68                 Passed - Patient is age 6 or older        Passed - Recent (12 mo) or future (30 days) visit within the authorizing provider's specialty     Patient has had an office visit with the authorizing provider or a provider within the authorizing providers department within the previous 12 mos or has a future within next 30 days. See \"Patient Info\" tab in inbasket, or \"Choose Columns\" in Meds & Orders section of the refill encounter.              Passed - Medication is active on med list             aKty Garcia 05/20/23 9:14 AM  "

## 2023-07-06 DIAGNOSIS — I21.4 NON-STEMI (NON-ST ELEVATED MYOCARDIAL INFARCTION) (H): ICD-10-CM

## 2023-07-06 RX ORDER — NITROGLYCERIN 0.4 MG/1
0.4 TABLET SUBLINGUAL EVERY 5 MIN PRN
Qty: 25 TABLET | Refills: 1 | Status: SHIPPED | OUTPATIENT
Start: 2023-07-06

## 2023-07-11 ENCOUNTER — OFFICE VISIT (OUTPATIENT)
Dept: CARDIOLOGY | Facility: CLINIC | Age: 63
End: 2023-07-11
Payer: COMMERCIAL

## 2023-07-11 VITALS
WEIGHT: 214.8 LBS | OXYGEN SATURATION: 94 % | HEART RATE: 93 BPM | SYSTOLIC BLOOD PRESSURE: 159 MMHG | RESPIRATION RATE: 20 BRPM | BODY MASS INDEX: 35.74 KG/M2 | DIASTOLIC BLOOD PRESSURE: 104 MMHG

## 2023-07-11 DIAGNOSIS — I25.5 ISCHEMIC CARDIOMYOPATHY: ICD-10-CM

## 2023-07-11 DIAGNOSIS — I21.4 NON-STEMI (NON-ST ELEVATED MYOCARDIAL INFARCTION) (H): ICD-10-CM

## 2023-07-11 PROCEDURE — 99214 OFFICE O/P EST MOD 30 MIN: CPT | Performed by: INTERNAL MEDICINE

## 2023-07-11 RX ORDER — FUROSEMIDE 20 MG
20 TABLET ORAL DAILY
Qty: 90 TABLET | Refills: 4 | Status: SHIPPED | OUTPATIENT
Start: 2023-07-11 | End: 2024-07-17

## 2023-07-11 NOTE — PATIENT INSTRUCTIONS
We are going to plan blood work in a week or so, please arrange for a blood test appointment in [place] sometime next week.Please let mw know if you need any refills of your heart medicine or if you are having chest discomfort, increased shortness of breath or other heart concerns.My nurse is Sherie and her number is [phone].Please consider getting a new blood pressure cuff and send 6 to 10 blood pressure readings to me over the next 1 month.

## 2023-07-11 NOTE — LETTER
7/11/2023    Lucretia Raya MD  [address]  [facility] [address]    RE: Catina BLOOM Armand       Dear Colleague,     [facility] the [facility].    HEART CARE ENCOUNTER CONSULTATON NOTE      [facility]  [phone]      Assessment/Recommendations   Assessment/Plan:  1.  Coronary artery disease.  Admitted October 2019 with acute coronary syndrome.  She has evidence for diffuse coronary artery disease including a chronically occluded right coronary artery intervention to the circumflex as described.  We talked about follow-up stress testing at that time.  Stress test was completed July 2022 that demonstrated a small area of mild degree of transmural infarction in the mid to basal inferior and inferior lateral segments with a small area of mild marce-infarct ischemia with ejection fraction of 50%.  She reports a single episode of atypical chest discomfort a few weeks ago and used nitroglycerin but has not had reoccurrence.  We will monitor for any recurrent symptoms.  Her preference is not to do follow-up stress testing.    2.  Mild decrease in left ventricular systolic function.  No findings of overt congestive heart failure on examination.  Plan to repeat echocardiogram to reassess left ventricular systolic function.  I reviewed the furosemide at 20 mg daily asking her to have repeat blood work in 7 to 10 days.    3.  Dyslipidemia.  Lipid results are reviewed from December 2022 through Dr. White.  Her LDL cholesterol was mildly above ideal goal at 73.  Going to plan follow-up laboratory studies that will include a direct LDL.    Plan 1.  Echocardiogram  2.  I renewed sublingual nitroglycerin and furosemide 20 mg daily asking her not to run out of her medications but to contact us.  3.  Laboratory studies in 1 to 2 weeks she would like to do in the [place] office and will have the results sent to me.  4.  Follow-up in approximately 6  months.         History of Present Illness/Subjective    HPI: Catina Acuna is a 62 year old female who is seen in follow up. She was admitted in October 2019 with acute coronary syndrome with symptoms of shortness of breath and chest discomfort.  She had been experiencing some burning chest discomfort for a few weeks prior to presentation.  We last visited April 2022.  I commented that she had been tolerant of higher doses of metoprolol.  She wanted to consider cutting back on her dose of diuretics.    She tells me that overall she is been feeling well.  Recalls a few weeks ago she took nitroglycerin x2 for some right-sided chest discomfort that was atypical and has not reoccurred.  She is not describing dizziness, orthopnea or PND.  She tells me she ran out of her furosemide a few weeks ago and is a little bloated but has not had significant fluid retention.  She is not certain why she ran out and I reminded her that she can contact us if she is not receiving a refill from her pharmacy.  Today we reviewed the prior testing including a nuclear stress test.  The nuclear stress test was difficult for her and she preferred not to do follow-up nuclear stress testing.    Mid RCA lesion is 100% stenosed.    Mid Cx to Dist Cx lesion is 80% stenosed.    Pressure wire/FFR was performed on the LCX lesion. pre diagnositic: 0.4. post diagnostic: 0.4.    A drug eluting stent was successfully placed into LCX.         Recent Echocardiogram Results:   abnormal data Echocardiogram Complete  Order: [id number]  Status: Final result     Visible to patient: Yes (seen)     Next appt: 07/11/2023 at 09:50 AM in Cardiology (Roberto Mabry MD)     Dx: CAD (coronary artery disease)     1 Result Note    1 Patient Communication  Details    Reading Physician Reading Date Result Priority   Edgar Beavers MD  [phone] 6/4/2021 Routine   Provider, Historical 6/4/2021      Narrative & Impression  1. The left ventricle is normal in  size. Left ventricular systolic performance is mildly reduced. The ejection fraction is estimated to be 45-50%.    2. There is severe basal and mid inferior hypokinesis.  There is severe basal posterior hypokinesis.  3. No significant valvular heart disease is identified on this study.   4. Normal right ventricular size and systolic performance.      When compared to the prior real-time echocardiogram dated 17 January 2020, there has been little appreciable interval change.  Specifically, it is this reader s impression that left ventricular systolic performance and regional wall motion appears   similar on both studies.         Recent Coronary Angiogram Results:    NM Lexiscan stress test  Order: [id number]  Status: Final result     Visible to patient: Yes (seen)     Next appt: 07/11/2023 at 09:50 AM in Cardiology (Roberto Mabry MD)     Dx: Ischemic cardiomyopathy; Mild left ve...     2 Result Notes    1 Patient Communication  Details    Reading Physician Reading Date Result Priority   Jose Croft MD  [phone] 7/5/2022 Routine   Jose Croft MD  [phone] 7/5/2022      Result Text       The nuclear stress test is abnormal.    There is a small area of a moderate degree of transmural infarction in the mid to basal inferior and inferolateral segment(s) of the left ventricle associated with a small area of a mild degree of marce-infarct ischemia.    The patient is at a low risk of future cardiac ischemic events.    The left ventricular ejection fraction at stress is 50%.    There is no prior study for comparison.             Physical Examination  Review of Systems   Vitals: 159/104 upon arrival.  She reports that she did not take her medicines yet this morning, 134/78 during my examination, pulse of 93 and regular, respiratory rate 20.  Wt Readings from Last 3 Encounters:   12/14/22 95.2 kg (209 lb 12.8 oz)   04/28/22 95.9 kg (211 lb 6.4 oz)   09/02/21 90.3 kg (199 lb)       General Appearance:   no  distress, normal body habitus   ENT/Mouth: membranes moist, .      EYES:  no scleral icterus, normal conjunctivae   Neck: no carotid bruits or thyromegaly   Chest/Lungs:   lungs are clear to auscultation, no rales or wheezing,, equal chest wall expansion    Cardiovascular:   Regular. Normal first and second heart sounds with no murmurs, rubs, or gallops; the carotid, radial and posterior tibial pulses are intact, Jugular venous pressure within normal limits, no significant edema bilaterally    Abdomen:  no organomegaly, masses, bruits, or tenderness; bowel sounds are present   Extremities: no cyanosis or clubbing   Skin: no xanthelasma, warm.    Neurologic: , no tremors     Psychiatric: alert and oriented x3, calm        Please refer above for cardiac ROS details.        Medical History  Surgical History Family History Social History   Past Medical History:   Diagnosis Date    Depression     NSTEMI (non-ST elevated myocardial infarction) (H) 10/30/2019     Past Surgical History:   Procedure Laterality Date    ANKLE FRACTURE SURGERY      CV CORONARY ANGIOGRAM N/A 10/31/2019    Procedure: Coronary Angiogram;  Surgeon: Chip Escalera MD;  Location: [facility];  Service: Cardiology     Family History   Problem Relation Age of Onset    Hypertension Mother     Heart Failure Mother     Dementia Mother     Heart Disease Mother     Hypertension Father     Heart Disease Father     Diabetes Father     Emphysema Father     Heart Disease Sister 58.00        4 vessel CBAG        Social History     Socioeconomic History    Marital status: Single     Spouse name: Not on file    Number of children: Not on file    Years of education: Not on file    Highest education level: Not on file   Occupational History    Not on file   Tobacco Use    Smoking status: Former     Packs/day: 0.10     Years: 20.00     Pack years: 2.00     Types: Cigarettes    Smokeless tobacco: Never   Vaping Use    Vaping Use: Never used   Substance  and Sexual Activity    Alcohol use: No     Comment: Alcoholic Drinks/day: h/o alcohol abuse     Drug use: No    Sexual activity: Never     Comment: single   Other Topics Concern    Not on file   Social History Narrative    Not on file     Social Determinants of Health     Financial Resource Strain: Not on file   Food Insecurity: Not on file   Transportation Needs: Not on file   Physical Activity: Not on file   Stress: Not on file   Social Connections: Not on file   Intimate Partner Violence: Not on file   Housing Stability: Not on file           Medications  Allergies   Current Outpatient Medications   Medication Sig Dispense Refill    metFORMIN (GLUCOPHAGE XR) 500 MG 24 hr tablet Take 4 tablets (2,000 mg) by mouth daily (with dinner) 360 tablet 3    acetaminophen (TYLENOL) 500 MG tablet [ACETAMINOPHEN (TYLENOL) 500 MG TABLET] Take 500 mg by mouth as needed for pain.      ADVAIR DISKUS 250-50 mcg/dose DISKUS [ADVAIR DISKUS 250-50 MCG/DOSE DISKUS] INHALE ONE PUFF BY MOUTH TWICE A DAY (Patient taking differently: as needed) 60 each 3    aspirin 81 mg chewable tablet [ASPIRIN 81 MG CHEWABLE TABLET] Chew 1 tablet (81 mg total) daily. (Patient taking differently: Take 162 mg by mouth daily Taking 2 81mg tablets) 30 tablet 0    atorvastatin (LIPITOR) 80 MG tablet TAKE ONE TABLET BY MOUTH AT BEDTIME 90 tablet 2    blood glucose (CONTOUR NEXT TEST) test strip 1 strip by In Vitro route daily 100 strip 1    blood-glucose meter (CONTOUR NEXT METER) Misc [BLOOD-GLUCOSE METER (CONTOUR NEXT METER) MISC] Use 1 each As Directed daily. 1 each 0    ezetimibe (ZETIA) 10 MG tablet TAKE ONE TABLET BY MOUTH EVERY DAY 90 tablet 3    furosemide (LASIX) 20 MG tablet Take 1 tablet (20 mg) by mouth daily 90 tablet 1    Lancets (ONETOUCH DELICA PLUS RETPOY67A) MISC USE TO TEST BLOOD SUGAR ONCE A  each 3    losartan (COZAAR) 25 MG tablet Take 1 tablet (25 mg) by mouth daily 90 tablet 2    metoprolol succinate ER (TOPROL XL) 25 MG 24 hr  tablet Take 0.5 tablets (12.5 mg) by mouth daily 45 tablet 2    miscellaneous medical supply (BLOOD PRESSURE CUFF) Misc [MISCELLANEOUS MEDICAL SUPPLY (BLOOD PRESSURE CUFF) MISC] Pt to take BP daily. Pt states needs average size cuff. 1 each 1    nitroGLYcerin (NITROSTAT) 0.4 MG sublingual tablet Place 1 tablet (0.4 mg) under the tongue every 5 minutes as needed for chest pain 25 tablet 1    ONETOUCH DELICA PLUS LANCET 33 gauge Misc [ONETOUCH DELICA PLUS LANCET 33 GAUGE MISC] USE TO TEST BLOOD SUGAR ONCE DAILY 100 each 3    senna (SENOKOT) 8.6 mg tablet [SENNA (SENOKOT) 8.6 MG TABLET] Take 1 tablet by mouth daily as needed for constipation.       No Known Allergies       Lab Results    Chemistry/lipid CBC Cardiac Enzymes/BNP/TSH/INR   Recent Labs   Lab Test 12/14/22  1450   CHOL 157   HDL 47*   LDL 73   TRIG 183*     Recent Labs   Lab Test 12/14/22  1450 04/28/22  1513 06/04/21  1100   LDL 73 87 71     Recent Labs   Lab Test 12/14/22  1450      POTASSIUM 4.4   CHLORIDE 105   CO2 18*   *   BUN 17.3   CR 0.69   GFRESTIMATED >90   DARLYN 9.7     Recent Labs   Lab Test 12/14/22  1450 04/28/22  1513 06/04/21  1100   CR 0.69 0.77 0.95     Recent Labs   Lab Test 12/14/22  1450 10/12/20  0928 04/23/20  1446   A1C 7.1* 7.3* 7.5*          Recent Labs   Lab Test 10/30/19  0814   WBC 8.9   HGB 11.1*   HCT 33.3*   MCV 90        Recent Labs   Lab Test 10/30/19  0814   HGB 11.1*    Recent Labs   Lab Test 11/01/19  0558 10/30/19  1159 10/30/19  1156   TROPONINI 0.59* 0.84* 0.86*     Recent Labs   Lab Test 04/28/22  1513 04/23/20  1446 10/30/19  0814   BNP 17 33 327*     Recent Labs   Lab Test 10/12/20  0950   TSH 2.76     No results for input(s): INR in the last 68732 hours.     Roberto Mabry MD             Thank you for allowing me to participate in the care of your patient.      Sincerely,     Roberto Mabry MD     [facility]  cc:   Roberto Mabry,  MD  [address]  [address]  [place] [address]

## 2023-07-11 NOTE — PROGRESS NOTES
HEART CARE ENCOUNTER CONSULTATON JOSE ANTONIO BLOOM [facility]  [phone]      Assessment/Recommendations   Assessment/Plan:  1.  Coronary artery disease.  Admitted October 2019 with acute coronary syndrome.  She has evidence for diffuse coronary artery disease including a chronically occluded right coronary artery intervention to the circumflex as described.  We talked about follow-up stress testing at that time.  Stress test was completed July 2022 that demonstrated a small area of mild degree of transmural infarction in the mid to basal inferior and inferior lateral segments with a small area of mild marce-infarct ischemia with ejection fraction of 50%.  She reports a single episode of atypical chest discomfort a few weeks ago and used nitroglycerin but has not had reoccurrence.  We will monitor for any recurrent symptoms.  Her preference is not to do follow-up stress testing.    2.  Mild decrease in left ventricular systolic function.  No findings of overt congestive heart failure on examination.  Plan to repeat echocardiogram to reassess left ventricular systolic function.  I reviewed the furosemide at 20 mg daily asking her to have repeat blood work in 7 to 10 days.    3.  Dyslipidemia.  Lipid results are reviewed from December 2022 through Dr. White.  Her LDL cholesterol was mildly above ideal goal at 73.  Going to plan follow-up laboratory studies that will include a direct LDL.    Plan 1.  Echocardiogram  2.  I renewed sublingual nitroglycerin and furosemide 20 mg daily asking her not to run out of her medications but to contact us.  3.  Laboratory studies in 1 to 2 weeks she would like to do in the [place] office and will have the results sent to me.  4.  Follow-up in approximately 6 months.         History of Present Illness/Subjective    HPI: Catina Acuna is a 62 year old female who is seen in follow up. She was admitted in October 2019 with acute coronary syndrome with symptoms of  shortness of breath and chest discomfort.  She had been experiencing some burning chest discomfort for a few weeks prior to presentation.  We last visited April 2022.  I commented that she had been tolerant of higher doses of metoprolol.  She wanted to consider cutting back on her dose of diuretics.    She tells me that overall she is been feeling well.  Recalls a few weeks ago she took nitroglycerin x2 for some right-sided chest discomfort that was atypical and has not reoccurred.  She is not describing dizziness, orthopnea or PND.  She tells me she ran out of her furosemide a few weeks ago and is a little bloated but has not had significant fluid retention.  She is not certain why she ran out and I reminded her that she can contact us if she is not receiving a refill from her pharmacy.  Today we reviewed the prior testing including a nuclear stress test.  The nuclear stress test was difficult for her and she preferred not to do follow-up nuclear stress testing.    Mid RCA lesion is 100% stenosed.    Mid Cx to Dist Cx lesion is 80% stenosed.    Pressure wire/FFR was performed on the LCX lesion. pre diagnositic: 0.4. post diagnostic: 0.4.    A drug eluting stent was successfully placed into LCX.         Recent Echocardiogram Results:   abnormal data Echocardiogram Complete  Order: [id number]   Status: Final result      Visible to patient: Yes (seen)      Next appt: 07/11/2023 at 09:50 AM in Cardiology (Roberto Mabry MD)      Dx: CAD (coronary artery disease)      1 Result Note     1 Patient Communication  Details    Reading Physician Reading Date Result Priority   Edgar Beavers MD  [phone] 6/4/2021 Routine   Provider, Historical 6/4/2021      Narrative & Impression  1. The left ventricle is normal in size. Left ventricular systolic performance is mildly reduced. The ejection fraction is estimated to be 45-50%.    2. There is severe basal and mid inferior hypokinesis.  There is severe basal posterior  hypokinesis.  3. No significant valvular heart disease is identified on this study.   4. Normal right ventricular size and systolic performance.      When compared to the prior real-time echocardiogram dated 17 January 2020, there has been little appreciable interval change.  Specifically, it is this reader s impression that left ventricular systolic performance and regional wall motion appears   similar on both studies.         Recent Coronary Angiogram Results:    NM Lexiscan stress test  Order: [id number]   Status: Final result      Visible to patient: Yes (seen)      Next appt: 07/11/2023 at 09:50 AM in Cardiology (Roberto Mabry MD)      Dx: Ischemic cardiomyopathy; Mild left ve...      2 Result Notes     1 Patient Communication  Details    Reading Physician Reading Date Result Priority   Jose Croft MD  [phone] 7/5/2022 Routine   Jose Croft MD  [phone] 7/5/2022      Result Text        The nuclear stress test is abnormal.     There is a small area of a moderate degree of transmural infarction in the mid to basal inferior and inferolateral segment(s) of the left ventricle associated with a small area of a mild degree of marce-infarct ischemia.     The patient is at a low risk of future cardiac ischemic events.     The left ventricular ejection fraction at stress is 50%.     There is no prior study for comparison.             Physical Examination  Review of Systems   Vitals: 159/104 upon arrival.  She reports that she did not take her medicines yet this morning, 134/78 during my examination, pulse of 93 and regular, respiratory rate 20.  Wt Readings from Last 3 Encounters:   12/14/22 95.2 kg (209 lb 12.8 oz)   04/28/22 95.9 kg (211 lb 6.4 oz)   09/02/21 90.3 kg (199 lb)       General Appearance:   no distress, normal body habitus   ENT/Mouth: membranes moist, .      EYES:  no scleral icterus, normal conjunctivae   Neck: no carotid bruits or thyromegaly   Chest/Lungs:   lungs are clear to  auscultation, no rales or wheezing,, equal chest wall expansion    Cardiovascular:   Regular. Normal first and second heart sounds with no murmurs, rubs, or gallops; the carotid, radial and posterior tibial pulses are intact, Jugular venous pressure within normal limits, no significant edema bilaterally    Abdomen:  no organomegaly, masses, bruits, or tenderness; bowel sounds are present   Extremities: no cyanosis or clubbing   Skin: no xanthelasma, warm.    Neurologic: , no tremors     Psychiatric: alert and oriented x3, calm        Please refer above for cardiac ROS details.        Medical History  Surgical History Family History Social History   Past Medical History:   Diagnosis Date     Depression      NSTEMI (non-ST elevated myocardial infarction) (H) 10/30/2019     Past Surgical History:   Procedure Laterality Date     ANKLE FRACTURE SURGERY       CV CORONARY ANGIOGRAM N/A 10/31/2019    Procedure: Coronary Angiogram;  Surgeon: Chip Escalera MD;  Location: [facility];  Service: Cardiology     Family History   Problem Relation Age of Onset     Hypertension Mother      Heart Failure Mother      Dementia Mother      Heart Disease Mother      Hypertension Father      Heart Disease Father      Diabetes Father      Emphysema Father      Heart Disease Sister 58.00        4 vessel CBAG        Social History     Socioeconomic History     Marital status: Single     Spouse name: Not on file     Number of children: Not on file     Years of education: Not on file     Highest education level: Not on file   Occupational History     Not on file   Tobacco Use     Smoking status: Former     Packs/day: 0.10     Years: 20.00     Pack years: 2.00     Types: Cigarettes     Smokeless tobacco: Never   Vaping Use     Vaping Use: Never used   Substance and Sexual Activity     Alcohol use: No     Comment: Alcoholic Drinks/day: h/o alcohol abuse      Drug use: No     Sexual activity: Never     Comment: single   Other Topics  Concern     Not on file   Social History Narrative     Not on file     Social Determinants of Health     Financial Resource Strain: Not on file   Food Insecurity: Not on file   Transportation Needs: Not on file   Physical Activity: Not on file   Stress: Not on file   Social Connections: Not on file   Intimate Partner Violence: Not on file   Housing Stability: Not on file           Medications  Allergies   Current Outpatient Medications   Medication Sig Dispense Refill     metFORMIN (GLUCOPHAGE XR) 500 MG 24 hr tablet Take 4 tablets (2,000 mg) by mouth daily (with dinner) 360 tablet 3     acetaminophen (TYLENOL) 500 MG tablet [ACETAMINOPHEN (TYLENOL) 500 MG TABLET] Take 500 mg by mouth as needed for pain.       ADVAIR DISKUS 250-50 mcg/dose DISKUS [ADVAIR DISKUS 250-50 MCG/DOSE DISKUS] INHALE ONE PUFF BY MOUTH TWICE A DAY (Patient taking differently: as needed) 60 each 3     aspirin 81 mg chewable tablet [ASPIRIN 81 MG CHEWABLE TABLET] Chew 1 tablet (81 mg total) daily. (Patient taking differently: Take 162 mg by mouth daily Taking 2 81mg tablets) 30 tablet 0     atorvastatin (LIPITOR) 80 MG tablet TAKE ONE TABLET BY MOUTH AT BEDTIME 90 tablet 2     blood glucose (CONTOUR NEXT TEST) test strip 1 strip by In Vitro route daily 100 strip 1     blood-glucose meter (CONTOUR NEXT METER) Misc [BLOOD-GLUCOSE METER (CONTOUR NEXT METER) MISC] Use 1 each As Directed daily. 1 each 0     ezetimibe (ZETIA) 10 MG tablet TAKE ONE TABLET BY MOUTH EVERY DAY 90 tablet 3     furosemide (LASIX) 20 MG tablet Take 1 tablet (20 mg) by mouth daily 90 tablet 1     Lancets (ONETOUCH DELICA PLUS [id number]) MISC USE TO TEST BLOOD SUGAR ONCE A  each 3     losartan (COZAAR) 25 MG tablet Take 1 tablet (25 mg) by mouth daily 90 tablet 2     metoprolol succinate ER (TOPROL XL) 25 MG 24 hr tablet Take 0.5 tablets (12.5 mg) by mouth daily 45 tablet 2     miscellaneous medical supply (BLOOD PRESSURE CUFF) Misc [MISCELLANEOUS MEDICAL SUPPLY (BLOOD  PRESSURE CUFF) MISC] Pt to take BP daily. Pt states needs average size cuff. 1 each 1     nitroGLYcerin (NITROSTAT) 0.4 MG sublingual tablet Place 1 tablet (0.4 mg) under the tongue every 5 minutes as needed for chest pain 25 tablet 1     ONETOUCH DELICA PLUS LANCET 33 gauge Misc [ONETOUCH DELICA PLUS LANCET 33 GAUGE MISC] USE TO TEST BLOOD SUGAR ONCE DAILY 100 each 3     senna (SENOKOT) 8.6 mg tablet [SENNA (SENOKOT) 8.6 MG TABLET] Take 1 tablet by mouth daily as needed for constipation.       No Known Allergies       Lab Results    Chemistry/lipid CBC Cardiac Enzymes/BNP/TSH/INR   Recent Labs   Lab Test 12/14/22  1450   CHOL 157   HDL 47*   LDL 73   TRIG 183*     Recent Labs   Lab Test 12/14/22  1450 04/28/22  1513 06/04/21  1100   LDL 73 87 71     Recent Labs   Lab Test 12/14/22  1450      POTASSIUM 4.4   CHLORIDE 105   CO2 18*   *   BUN 17.3   CR 0.69   GFRESTIMATED >90   DARLYN 9.7     Recent Labs   Lab Test 12/14/22  1450 04/28/22  1513 06/04/21  1100   CR 0.69 0.77 0.95     Recent Labs   Lab Test 12/14/22  1450 10/12/20  0928 04/23/20  1446   A1C 7.1* 7.3* 7.5*          Recent Labs   Lab Test 10/30/19  0814   WBC 8.9   HGB 11.1*   HCT 33.3*   MCV 90        Recent Labs   Lab Test 10/30/19  0814   HGB 11.1*    Recent Labs   Lab Test 11/01/19  0558 10/30/19  1159 10/30/19  1156   TROPONINI 0.59* 0.84* 0.86*     Recent Labs   Lab Test 04/28/22  1513 04/23/20  1446 10/30/19  0814   BNP 17 33 327*     Recent Labs   Lab Test 10/12/20  0950   TSH 2.76     No results for input(s): INR in the last [id number] hours.     Roberto Mabry MD

## 2023-07-15 ENCOUNTER — HEALTH MAINTENANCE LETTER (OUTPATIENT)
Age: 63
End: 2023-07-15

## 2023-07-21 DIAGNOSIS — I21.4 NON-STEMI (NON-ST ELEVATED MYOCARDIAL INFARCTION) (H): ICD-10-CM

## 2023-07-21 RX ORDER — ATORVASTATIN CALCIUM 80 MG/1
TABLET, FILM COATED ORAL
Qty: 90 TABLET | Refills: 3 | Status: SHIPPED | OUTPATIENT
Start: 2023-07-21 | End: 2024-07-17

## 2023-08-08 ENCOUNTER — TELEPHONE (OUTPATIENT)
Dept: CARDIOLOGY | Facility: CLINIC | Age: 63
End: 2023-08-08
Payer: COMMERCIAL

## 2023-08-14 DIAGNOSIS — E11.8 TYPE 2 DIABETES MELLITUS WITH COMPLICATION, WITHOUT LONG-TERM CURRENT USE OF INSULIN (H): ICD-10-CM

## 2023-08-14 NOTE — TELEPHONE ENCOUNTER
"Routing refill request to provider for review/approval because:  Patient needs to be seen because:  past due for 6 month follow up on diabetes    Last Written Prescription Date:  5/10/2023  Last Fill Quantity: 100,  # refills: 1   Last office visit provider:  2022     Requested Prescriptions   Pending Prescriptions Disp Refills    blood glucose (CONTOUR NEXT TEST) test strip 100 strip 1     Si strip by In Vitro route daily       Diabetic Supplies Protocol Failed - 2023 11:34 AM        Failed - Recent (6 mo) or future (30 days) visit within the authorizing provider's specialty     Patient had office visit in the last 6 months or has a visit in the next 30 days with authorizing provider.  See \"Patient Info\" tab in inbasket, or \"Choose Columns\" in Meds & Orders section of the refill encounter.            Passed - Medication is active on med list        Passed - Patient is 18 years of age or older             Madina Dumas RN 23 3:22 PM  "

## 2023-11-01 DIAGNOSIS — I25.5 ISCHEMIC CARDIOMYOPATHY: ICD-10-CM

## 2023-11-01 DIAGNOSIS — E11.8 TYPE 2 DIABETES MELLITUS WITH COMPLICATION, WITHOUT LONG-TERM CURRENT USE OF INSULIN (H): ICD-10-CM

## 2023-11-01 RX ORDER — LOSARTAN POTASSIUM 25 MG/1
25 TABLET ORAL DAILY
Qty: 90 TABLET | Refills: 1 | Status: SHIPPED | OUTPATIENT
Start: 2023-11-01 | End: 2024-04-29

## 2023-11-02 NOTE — TELEPHONE ENCOUNTER
I left a message for patient to call back and schedule an appointment. Please assist patient with scheduling when she calls back.

## 2023-11-06 NOTE — TELEPHONE ENCOUNTER
3rd and final VM left for the patient. Please assist patient with scheduling when she calls back.

## 2023-12-06 ENCOUNTER — MYC REFILL (OUTPATIENT)
Dept: FAMILY MEDICINE | Facility: CLINIC | Age: 63
End: 2023-12-06
Payer: COMMERCIAL

## 2023-12-06 DIAGNOSIS — I21.4 NON-STEMI (NON-ST ELEVATED MYOCARDIAL INFARCTION) (H): ICD-10-CM

## 2023-12-06 DIAGNOSIS — I25.5 ISCHEMIC CARDIOMYOPATHY: ICD-10-CM

## 2023-12-06 RX ORDER — METOPROLOL SUCCINATE 25 MG/1
12.5 TABLET, EXTENDED RELEASE ORAL DAILY
Qty: 45 TABLET | Refills: 2 | OUTPATIENT
Start: 2023-12-06

## 2023-12-25 NOTE — PATIENT INSTRUCTIONS - HE
Please check your bottles and call if you need any refills of your heart medicine.We are going to plan a heart ultrasound and some blood work looking at your cholesterol numbers and liver tests.My nurse is Sherie and her number is [phone]    Please consider signing up for cardiac rehab.If you decide you want to do cardiac rehab please call my nurse Sherie.Also call Dr Raya if nose bleeds continue.   Additional Safety/Bands:

## 2024-02-05 ENCOUNTER — NURSE TRIAGE (OUTPATIENT)
Dept: FAMILY MEDICINE | Facility: CLINIC | Age: 64
End: 2024-02-05
Payer: COMMERCIAL

## 2024-02-05 ENCOUNTER — MYC MEDICAL ADVICE (OUTPATIENT)
Dept: FAMILY MEDICINE | Facility: CLINIC | Age: 64
End: 2024-02-05
Payer: COMMERCIAL

## 2024-02-05 DIAGNOSIS — U07.1 INFECTION DUE TO 2019 NOVEL CORONAVIRUS: Primary | ICD-10-CM

## 2024-02-05 NOTE — TELEPHONE ENCOUNTER
"Nurse Triage SBAR    Is this a 2nd Level Triage? YES, LICENSED PRACTITIONER REVIEW IS REQUIRED    Situation:   \"Hi, I tested positive for COVID  this weekend.  Do I need an appointment to get and start Plavix? Araceli never had it before.  Thanks,  Catina Armand  [phone]\"    Background:   Age 63  Depression  NSTEMI  Obesity- Wt 95.2 kg (209 lb 12.8 oz) with BMI 34.91 kg/m  from last OV 12/14/22.  DM 2  Chronic heart failure with reduced ejection fraction.  CAD  Hx of tobacco abuse    GFR Estimate   Date Value Ref Range Status   12/14/2022 >90 >60 mL/min/1.73m2 Final     Comment:     Effective December 21, 2021 eGFRcr in adults is calculated using the 2021 CKD-EPI creatinine equation which includes age and gender ( et al., NEJ, DOI: 10.1056/VGGDvx0554621)   04/28/2022 88 >60 mL/min/1.73m2 Final     Comment:     Effective December 21, 2021 eGFRcr in adults is calculated using the 2021 CKD-EPI creatinine equation which includes age and gender ( et al., NEJM, DOI: 10.1056/HXKGfk1590472)   06/04/2021 60 (L) >60 mL/min/1.73m2 Final   10/12/2020 >60 >60 mL/min/1.73m2 Final   04/23/2020 >60 >60 mL/min/1.73m2 Final   09/03/2008 >90 >60 mL/min/1.7m2 Final     GFR Estimate If Black   Date Value Ref Range Status   06/04/2021 >60 >60 mL/min/1.73m2 Final   10/12/2020 >60 >60 mL/min/1.73m2 Final   04/23/2020 >60 >60 mL/min/1.73m2 Final   09/03/2008 >90 >60 mL/min/1.7m2 Final     Lab Results   Component Value Date    ALT 14 12/14/2022    ALT 15 09/03/2008     AST   Date Value Ref Range Status   12/14/2022 23 10 - 35 U/L Final   09/03/2008 15 0 - 45 U/L Final     Lab Results   Component Value Date    ALKPHOS 51 12/14/2022    ALKPHOS 71 09/03/2008     Lab Results   Component Value Date    BILITOTAL 0.2 12/14/2022    BILITOTAL 0.4 04/28/2022    BILITOTAL 0.6 06/04/2021    BILITOTAL 0.3 10/12/2020    BILITOTAL 0.3 04/23/2020    BILITOTAL 0.2 09/03/2008       Atorvastatin  (Lipitor) Instruct patient to stop atorvastatin " while taking Paxlovid and restart atorvastatin 1 day after the completion of Paxlovid.     -salmeterol-containing Medications (Advair)- at least 6 months ago       Assessment:   Fever or chills- yes  Cough- wet cough   Shortness of breath or difficulty breathing- when walking up the steps; labored breathing. Has heart failure so feels SOB with exertion.  Fatigue- yes  Muscle or body aches- yes  Headache- yes  New loss of taste or smell- no; slight loss of taste  Sore throat- no  Congestion or runny nose- yes  Nausea or vomiting- no  Diarrhea- slight bouts of diarrhea; last time of diarrhea as last night after eating    Feeling better; spoke to pharmacist about 10 mins and sx are better. Thursday night, feeling off and woke up Friday with chills, congested and cough.     Stuffy nose and congestion     Able to urinate in the last 12 hours, denied lightheadedness.        Protocol Recommended Disposition:   No disposition on file.    Recommendation:   Care advice given and pt verbalized understanding. Initiated RN COVID-19 Treatment Protocol, but will route to PCP to review and advise. Patient stated she have not use her advair diskus for at least 6 months, but because it is listed as active medication, writer unable to proceed with sending in rx for Paxlovid. Will message PCP to see if it is okay to send in rx for Paxlovid since pt have not use Advair for at least 6 months or if PCP prefers pt see a provider virtually first.    OK to leave detailed messages.       Routed to provider    Does the patient meet one of the following criteria for ADS visit consideration? 16+ years old, with an [facility] PCP     TIP  Providers, please consider if this condition is appropriate for management at one of our Acute and Diagnostic Services sites.     If patient is a good candidate, please use dotphrase <dot>triageresponse and select Refer to ADS to document.   Reason for Disposition   HIGH RISK patient (e.g., weak immune system, age >  64 years, obesity with BMI of 30 or higher, pregnant, chronic lung disease or other chronic medical condition) and COVID symptoms (e.g., cough, fever)  (Exceptions: Already seen by doctor or NP/PA and no new or worsening symptoms.)    Additional Information   Negative: SEVERE difficulty breathing (e.g., struggling for each breath, speaks in single words)   Negative: Difficult to awaken or acting confused (e.g., disoriented, slurred speech)   Negative: Bluish (or gray) lips or face now   Negative: Shock suspected (e.g., cold/pale/clammy skin, too weak to stand, low BP, rapid pulse)   Negative: Sounds like a life-threatening emergency to the triager   Negative: Diagnosed or suspected COVID-19 and symptoms lasting 3 or more weeks   Negative: COVID-19 exposure and no symptoms   Negative: COVID-19 vaccine reaction suspected (e.g., fever, headache, muscle aches) occurring 1 to 3 days after getting vaccine   Negative: COVID-19 vaccine, questions about   Negative: Lives with someone known to have influenza (flu test positive) and flu-like symptoms (e.g., cough, runny nose, sore throat, SOB; with or without fever)   Negative: Possible COVID-19 symptoms and triager concerned about severity of symptoms or other causes   Negative: COVID-19 and breastfeeding, questions about   Negative: SEVERE or constant chest pain or pressure  (Exception: Mild central chest pain, present only when coughing.)   Negative: MODERATE difficulty breathing (e.g., speaks in phrases, SOB even at rest, pulse 100-120)   Negative: Headache and stiff neck (can't touch chin to chest)   Negative: Oxygen level (e.g., pulse oximetry) 90% or lower   Negative: Chest pain or pressure  (Exception: MILD central chest pain, present only when coughing.)   Negative: Drinking very little and dehydration suspected (e.g., no urine > 12 hours, very dry mouth, very lightheaded)   Negative: Patient sounds very sick or weak to the triager   Negative: MILD difficulty  breathing (e.g., minimal/no SOB at rest, SOB with walking, pulse <100)   Negative: Fever > 103 F (39.4 C)   Negative: Fever > 101 F (38.3 C) and over 60 years of age   Negative: Fever > 100.0 F (37.8 C) and bedridden (e.g., CVA, chronic illness, recovering from surgery)    Protocols used: Coronavirus (COVID-19) Diagnosed or [id number]-HA      RN COVID TREATMENT VISIT  02/05/24      The patient has been triaged and does not require a higher level of care.    Catina Acuna  63 year old  Current weight? 215 lbs    Has the patient been seen by a primary care provider at an [facility] or [facility] within the past two years? Yes.   Have you been in close proximity to/do you have a known exposure to a person with a confirmed case of influenza? No.     General treatment eligibility:  Date of positive COVID test (PCR or at home)?  2/3/24    Are you or have you been hospitalized for this COVID-19 infection? No.   Have you received monoclonal antibodies or antiviral treatment for COVID-19 since this positive test? No.   Do you have any of the following conditions that place you at risk of being very sick from COVID-19?   - Age 50 years or older  - Diabetes mellitus, type 1 and type 2  - Heart conditions such as cardiomyopathies, congenital heart defects, coronary artery disease, heart arrhythmias, heart failure, hypertension, valve disorders   - Overweight or Obesity (BMI >85th percentile or BMI 25 or higher)  Yes, patient has at least one high risk condition as noted above.     Current COVID symptoms:   - fever or chills  - cough  - shortness of breath or difficulty breathing  - fatigue  - muscle or body aches  - headache  - new loss of taste or smell  - congestion or runny nose  - diarrhea  Yes. Patient has at least one symptom as selected.     How many days since symptoms started? 5 days or less. Established patient, 12 years or older weighing at least 88.2 lbs, who has symptoms that started  in the past 5 days, has not been hospitalized nor received treatment already, and is at risk for being very sick from COVID-19.     Treatment eligibility by RN:  Are you currently pregnant or nursing? No  Do you have a clinically significant hypersensitivity to nirmatrelvir or ritonavir, or toxic epidermal necrolysis (TEN) or Juan-Aki Syndrome? No  Do you have a history of hepatitis, any hepatic impairment on the Problem List (such as Child-Pena Class C, cirrhosis, fatty liver disease, alcoholic liver disease), or was the last liver lab (hepatic panel, ALT, AST, ALK Phos, bilirubin) elevated in the past 6 months? No  Do you have any history of severe renal impairment (eGFR < 30mL/min)? No    Is patient eligible to continue? Yes, patient meets all eligibility requirements for the RN COVID treatment (as denoted by all no responses above).     Current Outpatient Medications   Medication Sig Dispense Refill    acetaminophen (TYLENOL) 500 MG tablet [ACETAMINOPHEN (TYLENOL) 500 MG TABLET] Take 500 mg by mouth as needed for pain.      ADVAIR DISKUS 250-50 mcg/dose DISKUS [ADVAIR DISKUS 250-50 MCG/DOSE DISKUS] INHALE ONE PUFF BY MOUTH TWICE A DAY (Patient taking differently: as needed) 60 each 3    aspirin 81 mg chewable tablet [ASPIRIN 81 MG CHEWABLE TABLET] Chew 1 tablet (81 mg total) daily. (Patient taking differently: Take 162 mg by mouth daily Taking 2 81mg tablets) 30 tablet 0    atorvastatin (LIPITOR) 80 MG tablet TAKE ONE TABLET BY MOUTH AT BEDTIME 90 tablet 3    blood glucose (CONTOUR NEXT TEST) test strip 1 strip by In Vitro route daily 100 strip 1    blood-glucose meter (CONTOUR NEXT METER) Misc [BLOOD-GLUCOSE METER (CONTOUR NEXT METER) MISC] Use 1 each As Directed daily. 1 each 0    ezetimibe (ZETIA) 10 MG tablet TAKE ONE TABLET BY MOUTH EVERY DAY 90 tablet 3    furosemide (LASIX) 20 MG tablet Take 1 tablet (20 mg) by mouth daily 90 tablet 4    Lancets (ONETOUCH DELICA PLUS [id number]) MISC USE TO TEST  BLOOD SUGAR ONCE A  each 3    losartan (COZAAR) 25 MG tablet Take 1 tablet (25 mg) by mouth daily 90 tablet 1    metFORMIN (GLUCOPHAGE XR) 500 MG 24 hr tablet Take 4 tablets (2,000 mg) by mouth daily (with dinner) 360 tablet 3    metoprolol succinate ER (TOPROL XL) 25 MG 24 hr tablet Take 0.5 tablets (12.5 mg) by mouth daily 45 tablet 2    nitroGLYcerin (NITROSTAT) 0.4 MG sublingual tablet Place 1 tablet (0.4 mg) under the tongue every 5 minutes as needed for chest pain 25 tablet 1    ONETOUCH DELICA PLUS LANCET 33 gauge Misc [ONETOUCH DELICA PLUS LANCET 33 GAUGE MISC] USE TO TEST BLOOD SUGAR ONCE DAILY 100 each 3    senna (SENOKOT) 8.6 mg tablet [SENNA (SENOKOT) 8.6 MG TABLET] Take 1 tablet by mouth daily as needed for constipation.         Medications from List 1 of the standing order (on medications that exclude the use of Paxlovid) that patient is taking: NONE. Is patient taking Asher's Wort? No  Is patient taking Asher's Wort or any meds from List 1? No.   Medications from List 2 of the standing order (on meds that provider needs to adjust) that patient is taking: NONE. Is patient on any of the meds from List 2? No.   Medications from List 3 of standing order (on meds that a RN needs to adjust) that patient is taking: atorvastatin (Lipitor): Instructed patient to stop atorvastatin while taking Paxlovid and restart atorvastatin 1 day after the completion of Paxlovid.  Is patient on any meds from List 3? Yes. Patient is on meds from list 3. No meds require a provider visit and at least one med required RN to adjust.     Paxlovid has an approximate 90% reduction in hospitalization. Paxlovid can possibly cause altered sense of taste, diarrhea (loose, watery stools), high blood pressure, muscle aches.     Would patient like a Paxlovid prescription?   Yes.   Lab Results   Component Value Date    GFRESTIMATED >90 12/14/2022       Was last eGFR reduced? No, eGFR 60 or greater/ No Result on record. Patient  can receive the normal renal function dose. [facility]    Temporary change to home medications:   Instruct patient to stop atorvastatin while taking Paxlovid and restart atorvastatin 1 day after the completion of Paxlovid.    All medication adjustments (holds, etc) were discussed with the patient and patient was asked to repeat back (teachback) their med adjustment.  Did patient understand med adjustment? Yes, patient repeated back and understood correctly.        Reviewed the following instructions with the patient:    Paxlovid (nimatrelvir and ritonavir)    How it works  Two medicines (nirmatrelvir and ritonavir) are taken together. They stop the virus from growing. Less amount of virus is easier for your body to fight.    How to take  Medicine comes in a daily container with both medicine tablets. Take by mouth twice daily (once in the morning, once at night) for 5 days.  The number of tablets to take varies by patient.  Don't chew or break capsules. Swallow whole.    When to take  Take as soon as possible after positive COVID-19 test result, and within 5 days of your first symptoms.    Possible side effects  Can cause altered sense of taste, diarrhea (loose, watery stools), high blood pressure, muscle aches.    Jatinder Tenorio, SELENAN, RN  [facility]

## 2024-02-05 NOTE — TELEPHONE ENCOUNTER
Please see Nurse Triage from today for details.    Jatinder Tenorio, SELENAN, RN  [facility]

## 2024-02-05 NOTE — TELEPHONE ENCOUNTER
I think it is reasonable to start Paxlovid, remain off Advair.  She should monitor closely for any new respiratory symptoms.  VJ

## 2024-02-05 NOTE — TELEPHONE ENCOUNTER
Provider Recommendation Follow Up:   Reached patient/caregiver. Informed of provider's recommendations. Patient verbalized understanding and agrees with the plan.     SELENA FosterN, RN  [facility]

## 2024-02-10 ENCOUNTER — HEALTH MAINTENANCE LETTER (OUTPATIENT)
Age: 64
End: 2024-02-10

## 2024-03-21 NOTE — PROGRESS NOTES
HEART CARE ENCOUNTER CONSULTATON NOTE      [facility]  [phone]      Assessment/Recommendations   Assessment:   Coronary artery disease: PCI Lcx, RCA  10/2019 on aspirin 162 mg daily.  Denies angina.  NM stress test 6/2022 showed transmural infarction of inferior segments, small marce-infarct ischemia.  Hyperlipidemia: Controlled on Atorvastatin 80 mg, Zetia 10 mg  Ischemic cardiomyopathy/HFmrEF: LVEF 45-50% in compensated. Losartan 25 mg, metoprolol succinate 25 mg, Furosemide 20 mg  Hypertension: Elevated, has not taken morning medications.  Have asked patient to monitor him at least a few times a week going forward.  ERNIE: Did not tolerate CPAP  Dyspnea on exertion: More heavy breathing with exertion (stairs) noticed after having COVID in February.  Not similar to symptoms prior to TIA in 2019, no new LE edema/JVD/orthopnea      Plan:   Repeat echocardiogram, previously recommended by Dr. Mabry for cardiomyopathy monitoring, and evaluate some recent shortness of breath following COVID  Is not fasting to update lipid panel, patient will be having lipid panel/BMP monitored at PCP visit 4/22  We discussed monitoring blood pressure daily for 1 week, goal less than 140/90 and ideally less than 130/80.  Instructed to take after morning medications, after sitting for 5/10 minutes.  If elevated she will contact clinic for likely adjustment to losartan.      Follow up in 1 year or sooner pending results -needs to establish with new primary cardiologist     History of Present Illness/Subjective    HPI: Catina Acuna is a 63 year old female with PMHx of CAD/PCI, HLD, ischemic cardiomyopathy, heart failure with mildly reduced ejection fraction, HTN, ERNIE presents for follow up.  Previous patient of Dr. Mabry, last visit 7/2023 at which time he recommended repeat echocardiogram monitoring of LV function although this was never completed.  Also did not complete suggested lab work.  Says she struggles  with memory issues long-term, forgets easily.    Patient had COVID beginning of February 2024, since then has felt a bit more heavy breathing with activity such as climbing stairs.  He is still able to perform the task.  Denies any chest pain with exertion, or shortness of breath at rest.  Anginal symptoms prior to stent in 2019 included right arm weakness/discomfort, indigestion, feeling short of breath at rest.  She has no new swelling.  Sleep apnea is untreated because she did not tolerate CPAP in the past.  Pressure is elevated and she is not taking morning medications because its too early for her.  Does not monitor blood pressure at home, but did recently get new blood pressure cuff.    She denies lightheadedness, orthopnea, PND, and palpitations.        NM Lexiscan stress test 7/2022    The nuclear stress test is abnormal.    There is a small area of a moderate degree of transmural infarction in the mid to basal inferior and inferolateral segment(s) of the left ventricle associated with a small area of a mild degree of marce-infarct ischemia.    The patient is at a low risk of future cardiac ischemic events.    The left ventricular ejection fraction at stress is 50%.    There is no prior study for comparison.      Echocardiogram 6/2021 results:  1. The left ventricle is normal in size. Left ventricular systolic performance is mildly reduced. The ejection fraction is estimated to be 45-50%.    2. There is severe basal and mid inferior hypokinesis.  There is severe basal posterior hypokinesis.  3. No significant valvular heart disease is identified on this study.   4. Normal right ventricular size and systolic performance.      When compared to the prior real-time echocardiogram dated 17 January 2020, there has been little appreciable interval change.  Specifically, it is this reader s impression that left ventricular systolic performance and regional wall motion appears   similar on both studies.     Coronary  "angiogram 10/2019    Ost RCA to Mid RCA lesion is 35% stenosed.    Mid RCA lesion is 100% stenosed.    Mid Cx to Dist Cx lesion is 80% stenosed.    Pressure wire/FFR was performed on the LCX lesion. pre diagnositic: 0.4. post diagnostic: 0.4.    A drug eluting stent was successfully placed into LCX.     Physical Examination  Review of Systems   Vitals: BP (!) 154/99 (BP Location: Left arm, Patient Position: Sitting, Cuff Size: Adult Large)   Pulse 76   Resp 16   Ht 1.651 m (5' 5\")   Wt 99.8 kg (220 lb 1.6 oz)   BMI 36.63 kg/m    BMI= Body mass index is 36.63 kg/m .  Wt Readings from Last 3 Encounters:   03/22/24 99.8 kg (220 lb 1.6 oz)   07/11/23 97.4 kg (214 lb 12.8 oz)   12/14/22 95.2 kg (209 lb 12.8 oz)           ENT/Mouth: membranes moist, no oral lesions or bleeding gums.      EYES:  no scleral icterus, normal conjunctivae                    Neck: No carotid bruit or thyromegaly   Chest/Lungs:   lungs are clear to auscultation, no rales or wheezing, equal chest wall expansion    Cardiovascular:   Regular. Normal first and second heart sounds with no murmurs, rubs, or gallops; the carotid, radial and posterior tibial pulses are intact, Jugular venous pressure normal, trace edema bilaterally        Extremities: no cyanosis or clubbing   Skin: no xanthelasma, warm.    Neurologic: no tremors     Psychiatric: alert and oriented x3, calm        Please refer above for cardiac ROS details.        Medical History  Surgical History Family History Social History   Past Medical History:   Diagnosis Date    Depression     NSTEMI (non-ST elevated myocardial infarction) (H) 10/30/2019     Past Surgical History:   Procedure Laterality Date    ANKLE FRACTURE SURGERY      CV CORONARY ANGIOGRAM N/A 10/31/2019    Procedure: Coronary Angiogram;  Surgeon: Chip Escalera MD;  Location: [facility];  Service: Cardiology     Family History   Problem Relation Age of Onset    Hypertension Mother     Heart Failure Mother "     Dementia Mother     Heart Disease Mother     Hypertension Father     Heart Disease Father     Diabetes Father     Emphysema Father     Heart Disease Sister 58.00        4 vessel CBAG        Social History     Socioeconomic History    Marital status: Single     Spouse name: Not on file    Number of children: Not on file    Years of education: Not on file    Highest education level: Not on file   Occupational History    Not on file   Tobacco Use    Smoking status: Former     Packs/day: 0.10     Years: 20.00     Additional pack years: 0.00     Total pack years: 2.00     Types: Cigarettes    Smokeless tobacco: Never   Vaping Use    Vaping Use: Never used   Substance and Sexual Activity    Alcohol use: No     Comment: Alcoholic Drinks/day: h/o alcohol abuse     Drug use: No    Sexual activity: Never     Comment: single   Other Topics Concern    Not on file   Social History Narrative    Not on file     Social Determinants of Health     Financial Resource Strain: Not on file   Food Insecurity: Not on file   Transportation Needs: Not on file   Physical Activity: Not on file   Stress: Not on file   Social Connections: Not on file   Interpersonal Safety: Not on file   Housing Stability: Not on file           Medications  Allergies   Current Outpatient Medications   Medication Sig Dispense Refill    acetaminophen (TYLENOL) 500 MG tablet [ACETAMINOPHEN (TYLENOL) 500 MG TABLET] Take 500 mg by mouth as needed for pain.      ADVAIR DISKUS 250-50 mcg/dose DISKUS [ADVAIR DISKUS 250-50 MCG/DOSE DISKUS] INHALE ONE PUFF BY MOUTH TWICE A DAY (Patient taking differently: as needed) 60 each 3    aspirin 81 mg chewable tablet [ASPIRIN 81 MG CHEWABLE TABLET] Chew 1 tablet (81 mg total) daily. (Patient taking differently: Take 162 mg by mouth daily Taking 2 81mg tablets) 30 tablet 0    atorvastatin (LIPITOR) 80 MG tablet TAKE ONE TABLET BY MOUTH AT BEDTIME 90 tablet 3    blood glucose (CONTOUR NEXT TEST) test strip 1 strip by In Vitro  route daily 100 strip 1    blood-glucose meter (CONTOUR NEXT METER) Misc [BLOOD-GLUCOSE METER (CONTOUR NEXT METER) MISC] Use 1 each As Directed daily. 1 each 0    ezetimibe (ZETIA) 10 MG tablet TAKE ONE TABLET BY MOUTH EVERY DAY 90 tablet 3    furosemide (LASIX) 20 MG tablet Take 1 tablet (20 mg) by mouth daily 90 tablet 4    Lancets (ONETOUCH DELICA PLUS [id number]) MISC USE TO TEST BLOOD SUGAR ONCE A  each 3    losartan (COZAAR) 25 MG tablet Take 1 tablet (25 mg) by mouth daily 90 tablet 1    metFORMIN (GLUCOPHAGE XR) 500 MG 24 hr tablet Take 4 tablets (2,000 mg) by mouth daily (with dinner) 360 tablet 3    metoprolol succinate ER (TOPROL XL) 25 MG 24 hr tablet Take 0.5 tablets (12.5 mg) by mouth daily 45 tablet 2    nitroGLYcerin (NITROSTAT) 0.4 MG sublingual tablet Place 1 tablet (0.4 mg) under the tongue every 5 minutes as needed for chest pain 25 tablet 1    ONETOUCH DELICA PLUS LANCET 33 gauge Misc [ONETOUCH DELICA PLUS LANCET 33 GAUGE MISC] USE TO TEST BLOOD SUGAR ONCE DAILY 100 each 3    senna (SENOKOT) 8.6 mg tablet [SENNA (SENOKOT) 8.6 MG TABLET] Take 1 tablet by mouth daily as needed for constipation.       No Known Allergies       Lab Results    Chemistry/lipid CBC Cardiac Enzymes/BNP/TSH/INR   Recent Labs   Lab Test 12/14/22  1450   CHOL 157   HDL 47*   LDL 73   TRIG 183*     Recent Labs   Lab Test 12/14/22  1450 04/28/22  1513 06/04/21  1100   LDL 73 87 71     Recent Labs   Lab Test 12/14/22  1450      POTASSIUM 4.4   CHLORIDE 105   CO2 18*   *   BUN 17.3   CR 0.69   GFRESTIMATED >90   DARLYN 9.7     Recent Labs   Lab Test 12/14/22  1450 04/28/22  1513 06/04/21  1100   CR 0.69 0.77 0.95     Recent Labs   Lab Test 12/14/22  1450 10/12/20  0928 04/23/20  1446   A1C 7.1* 7.3* 7.5*          Recent Labs   Lab Test 10/30/19  0814   WBC 8.9   HGB 11.1*   HCT 33.3*   MCV 90        Recent Labs   Lab Test 10/30/19  0814   HGB 11.1*    Recent Labs   Lab Test 11/01/19  0558 10/30/19  1150  "10/30/19  1156   TROPONINI 0.59* 0.84* 0.86*     Recent Labs   Lab Test 04/28/22  1513 04/23/20  1446 10/30/19  0814   BNP 17 33 327*     Recent Labs   Lab Test 10/12/20  0950   TSH 2.76     No results for input(s): \"INR\" in the last 40832 hours.       This note has been dictated using voice recognition software. Any grammatical, typographical, or context distortions are unintentional and inherent to the software    Debora Tapia PA-C                                       "

## 2024-03-22 ENCOUNTER — OFFICE VISIT (OUTPATIENT)
Dept: CARDIOLOGY | Facility: CLINIC | Age: 64
End: 2024-03-22
Payer: COMMERCIAL

## 2024-03-22 VITALS
HEART RATE: 76 BPM | DIASTOLIC BLOOD PRESSURE: 99 MMHG | RESPIRATION RATE: 16 BRPM | BODY MASS INDEX: 36.67 KG/M2 | SYSTOLIC BLOOD PRESSURE: 154 MMHG | WEIGHT: 220.1 LBS | HEIGHT: 65 IN

## 2024-03-22 DIAGNOSIS — R06.09 DYSPNEA ON EXERTION: ICD-10-CM

## 2024-03-22 DIAGNOSIS — I25.10 CORONARY ARTERY DISEASE INVOLVING NATIVE CORONARY ARTERY OF NATIVE HEART WITHOUT ANGINA PECTORIS: ICD-10-CM

## 2024-03-22 DIAGNOSIS — I10 BENIGN ESSENTIAL HYPERTENSION: ICD-10-CM

## 2024-03-22 DIAGNOSIS — I50.22 CHRONIC HFREF (HEART FAILURE WITH REDUCED EJECTION FRACTION) (H): Primary | ICD-10-CM

## 2024-03-22 DIAGNOSIS — E78.2 MIXED HYPERLIPIDEMIA: ICD-10-CM

## 2024-03-22 PROCEDURE — 99214 OFFICE O/P EST MOD 30 MIN: CPT

## 2024-03-22 PROCEDURE — G2211 COMPLEX E/M VISIT ADD ON: HCPCS

## 2024-03-22 NOTE — LETTER
3/22/2024    Lucretia Raya MD  [address]  [facility] [address]    RE: Catina Acuna       Dear Colleague,     I had the pleasure of seeing Catina Acuna in the [facility].    HEART CARE ENCOUNTER CONSULTATON NOTE      [facility]  [phone]      Assessment/Recommendations   Assessment:   Coronary artery disease: PCI Lcx, RCA  10/2019 on aspirin 162 mg daily.  Denies angina.  NM stress test 6/2022 showed transmural infarction of inferior segments, small marce-infarct ischemia.  Hyperlipidemia: Controlled on Atorvastatin 80 mg, Zetia 10 mg  Ischemic cardiomyopathy/HFmrEF: LVEF 45-50% in compensated. Losartan 25 mg, metoprolol succinate 25 mg, Furosemide 20 mg  Hypertension: Elevated, has not taken morning medications.  Have asked patient to monitor him at least a few times a week going forward.  ERNIE: Did not tolerate CPAP  Dyspnea on exertion: More heavy breathing with exertion (stairs) noticed after having COVID in February.  Not similar to symptoms prior to TIA in 2019, no new LE edema/JVD/orthopnea      Plan:   Repeat echocardiogram, previously recommended by Dr. Mabry for cardiomyopathy monitoring, and evaluate some recent shortness of breath following COVID  Is not fasting to update lipid panel, patient will be having lipid panel/BMP monitored at PCP visit 4/22  We discussed monitoring blood pressure daily for 1 week, goal less than 140/90 and ideally less than 130/80.  Instructed to take after morning medications, after sitting for 5/10 minutes.  If elevated she will contact clinic for likely adjustment to losartan.      Follow up in 1 year or sooner pending results -needs to establish with new primary cardiologist     History of Present Illness/Subjective    HPI: Catina Acuna is a 63 year old female with PMHx of CAD/PCI, HLD, ischemic cardiomyopathy, heart failure with mildly reduced ejection fraction, HTN, ERNIE presents for follow up.  Previous  patient of Dr. Mabry, last visit 7/2023 at which time he recommended repeat echocardiogram monitoring of LV function although this was never completed.  Also did not complete suggested lab work.  Says she struggles with memory issues long-term, forgets easily.    Patient had COVID beginning of February 2024, since then has felt a bit more heavy breathing with activity such as climbing stairs.  He is still able to perform the task.  Denies any chest pain with exertion, or shortness of breath at rest.  Anginal symptoms prior to stent in 2019 included right arm weakness/discomfort, indigestion, feeling short of breath at rest.  She has no new swelling.  Sleep apnea is untreated because she did not tolerate CPAP in the past.  Pressure is elevated and she is not taking morning medications because its too early for her.  Does not monitor blood pressure at home, but did recently get new blood pressure cuff.    She denies lightheadedness, orthopnea, PND, and palpitations.        NM Lexiscan stress test 7/2022    The nuclear stress test is abnormal.    There is a small area of a moderate degree of transmural infarction in the mid to basal inferior and inferolateral segment(s) of the left ventricle associated with a small area of a mild degree of marce-infarct ischemia.    The patient is at a low risk of future cardiac ischemic events.    The left ventricular ejection fraction at stress is 50%.    There is no prior study for comparison.      Echocardiogram 6/2021 results:  1. The left ventricle is normal in size. Left ventricular systolic performance is mildly reduced. The ejection fraction is estimated to be 45-50%.    2. There is severe basal and mid inferior hypokinesis.  There is severe basal posterior hypokinesis.  3. No significant valvular heart disease is identified on this study.   4. Normal right ventricular size and systolic performance.      When compared to the prior real-time echocardiogram dated 17 January 2020,  "there has been little appreciable interval change.  Specifically, it is this reader s impression that left ventricular systolic performance and regional wall motion appears   similar on both studies.     Coronary angiogram 10/2019    Ost RCA to Mid RCA lesion is 35% stenosed.    Mid RCA lesion is 100% stenosed.    Mid Cx to Dist Cx lesion is 80% stenosed.    Pressure wire/FFR was performed on the LCX lesion. pre diagnositic: 0.4. post diagnostic: 0.4.    A drug eluting stent was successfully placed into LCX.     Physical Examination  Review of Systems   Vitals: BP (!) 154/99 (BP Location: Left arm, Patient Position: Sitting, Cuff Size: Adult Large)   Pulse 76   Resp 16   Ht 1.651 m (5' 5\")   Wt 99.8 kg (220 lb 1.6 oz)   BMI 36.63 kg/m    BMI= Body mass index is 36.63 kg/m .  Wt Readings from Last 3 Encounters:   03/22/24 99.8 kg (220 lb 1.6 oz)   07/11/23 97.4 kg (214 lb 12.8 oz)   12/14/22 95.2 kg (209 lb 12.8 oz)           ENT/Mouth: membranes moist, no oral lesions or bleeding gums.      EYES:  no scleral icterus, normal conjunctivae                    Neck: No carotid bruit or thyromegaly   Chest/Lungs:   lungs are clear to auscultation, no rales or wheezing, equal chest wall expansion    Cardiovascular:   Regular. Normal first and second heart sounds with no murmurs, rubs, or gallops; the carotid, radial and posterior tibial pulses are intact, Jugular venous pressure normal, trace edema bilaterally        Extremities: no cyanosis or clubbing   Skin: no xanthelasma, warm.    Neurologic: no tremors     Psychiatric: alert and oriented x3, calm        Please refer above for cardiac ROS details.        Medical History  Surgical History Family History Social History   Past Medical History:   Diagnosis Date    Depression     NSTEMI (non-ST elevated myocardial infarction) (H) 10/30/2019     Past Surgical History:   Procedure Laterality Date    ANKLE FRACTURE SURGERY      CV CORONARY ANGIOGRAM N/A 10/31/2019    " Procedure: Coronary Angiogram;  Surgeon: Chip Escalera MD;  Location: [facility];  Service: Cardiology     Family History   Problem Relation Age of Onset    Hypertension Mother     Heart Failure Mother     Dementia Mother     Heart Disease Mother     Hypertension Father     Heart Disease Father     Diabetes Father     Emphysema Father     Heart Disease Sister 58.00        4 vessel CBAG        Social History     Socioeconomic History    Marital status: Single     Spouse name: Not on file    Number of children: Not on file    Years of education: Not on file    Highest education level: Not on file   Occupational History    Not on file   Tobacco Use    Smoking status: Former     Packs/day: 0.10     Years: 20.00     Additional pack years: 0.00     Total pack years: 2.00     Types: Cigarettes    Smokeless tobacco: Never   Vaping Use    Vaping Use: Never used   Substance and Sexual Activity    Alcohol use: No     Comment: Alcoholic Drinks/day: h/o alcohol abuse     Drug use: No    Sexual activity: Never     Comment: single   Other Topics Concern    Not on file   Social History Narrative    Not on file     Social Determinants of Health     Financial Resource Strain: Not on file   Food Insecurity: Not on file   Transportation Needs: Not on file   Physical Activity: Not on file   Stress: Not on file   Social Connections: Not on file   Interpersonal Safety: Not on file   Housing Stability: Not on file           Medications  Allergies   Current Outpatient Medications   Medication Sig Dispense Refill    acetaminophen (TYLENOL) 500 MG tablet [ACETAMINOPHEN (TYLENOL) 500 MG TABLET] Take 500 mg by mouth as needed for pain.      ADVAIR DISKUS 250-50 mcg/dose DISKUS [ADVAIR DISKUS 250-50 MCG/DOSE DISKUS] INHALE ONE PUFF BY MOUTH TWICE A DAY (Patient taking differently: as needed) 60 each 3    aspirin 81 mg chewable tablet [ASPIRIN 81 MG CHEWABLE TABLET] Chew 1 tablet (81 mg total) daily. (Patient taking differently:  Take 162 mg by mouth daily Taking 2 81mg tablets) 30 tablet 0    atorvastatin (LIPITOR) 80 MG tablet TAKE ONE TABLET BY MOUTH AT BEDTIME 90 tablet 3    blood glucose (CONTOUR NEXT TEST) test strip 1 strip by In Vitro route daily 100 strip 1    blood-glucose meter (CONTOUR NEXT METER) Misc [BLOOD-GLUCOSE METER (CONTOUR NEXT METER) MISC] Use 1 each As Directed daily. 1 each 0    ezetimibe (ZETIA) 10 MG tablet TAKE ONE TABLET BY MOUTH EVERY DAY 90 tablet 3    furosemide (LASIX) 20 MG tablet Take 1 tablet (20 mg) by mouth daily 90 tablet 4    Lancets (ONETOUCH DELICA PLUS [id number]) MISC USE TO TEST BLOOD SUGAR ONCE A  each 3    losartan (COZAAR) 25 MG tablet Take 1 tablet (25 mg) by mouth daily 90 tablet 1    metFORMIN (GLUCOPHAGE XR) 500 MG 24 hr tablet Take 4 tablets (2,000 mg) by mouth daily (with dinner) 360 tablet 3    metoprolol succinate ER (TOPROL XL) 25 MG 24 hr tablet Take 0.5 tablets (12.5 mg) by mouth daily 45 tablet 2    nitroGLYcerin (NITROSTAT) 0.4 MG sublingual tablet Place 1 tablet (0.4 mg) under the tongue every 5 minutes as needed for chest pain 25 tablet 1    ONETOUCH DELICA PLUS LANCET 33 gauge Misc [ONETOUCH DELICA PLUS LANCET 33 GAUGE MISC] USE TO TEST BLOOD SUGAR ONCE DAILY 100 each 3    senna (SENOKOT) 8.6 mg tablet [SENNA (SENOKOT) 8.6 MG TABLET] Take 1 tablet by mouth daily as needed for constipation.       No Known Allergies       Lab Results    Chemistry/lipid CBC Cardiac Enzymes/BNP/TSH/INR   Recent Labs   Lab Test 12/14/22  1450   CHOL 157   HDL 47*   LDL 73   TRIG 183*     Recent Labs   Lab Test 12/14/22  1450 04/28/22  1513 06/04/21  1100   LDL 73 87 71     Recent Labs   Lab Test 12/14/22  1450      POTASSIUM 4.4   CHLORIDE 105   CO2 18*   *   BUN 17.3   CR 0.69   GFRESTIMATED >90   DARLYN 9.7     Recent Labs   Lab Test 12/14/22  1450 04/28/22  1513 06/04/21  1100   CR 0.69 0.77 0.95     Recent Labs   Lab Test 12/14/22  1450 10/12/20  0928 04/23/20  1446   A1C 7.1* 7.3*  "7.5*          Recent Labs   Lab Test 10/30/19  0814   WBC 8.9   HGB 11.1*   HCT 33.3*   MCV 90        Recent Labs   Lab Test 10/30/19  0814   HGB 11.1*    Recent Labs   Lab Test 11/01/19  0558 10/30/19  1159 10/30/19  1156   TROPONINI 0.59* 0.84* 0.86*     Recent Labs   Lab Test 04/28/22  1513 04/23/20  1446 10/30/19  0814   BNP 17 33 327*     Recent Labs   Lab Test 10/12/20  0950   TSH 2.76     No results for input(s): \"INR\" in the last 38486 hours.       This note has been dictated using voice recognition software. Any grammatical, typographical, or context distortions are unintentional and inherent to the software    Debora Tapia PA-C      Thank you for allowing me to participate in the care of your patient.      Sincerely,     Debora Fan PA-C     [facility]  cc:   Roberto Mabry MD  [address] [facility]  [address]  [place] [address]      "

## 2024-03-22 NOTE — PATIENT INSTRUCTIONS
It was a pleasure taking part in your care today:    - Watch blood pressure a few times a week, goal of <140/90, ideally <130/80. If consistently elevated report to clinic  - Schedule echocardiogram    Please call the [facility] clinic with any questions or concerns at [phone].     Debora Tapia PA-C

## 2024-04-02 ENCOUNTER — HOSPITAL ENCOUNTER (OUTPATIENT)
Dept: CARDIOLOGY | Facility: CLINIC | Age: 64
Discharge: HOME | End: 2024-04-02
Payer: COMMERCIAL

## 2024-04-02 DIAGNOSIS — I50.22 CHRONIC HFREF (HEART FAILURE WITH REDUCED EJECTION FRACTION) (H): ICD-10-CM

## 2024-04-02 LAB — LVEF ECHO: NORMAL

## 2024-04-02 PROCEDURE — 93306 TTE W/DOPPLER COMPLETE: CPT | Mod: 26 | Performed by: INTERNAL MEDICINE

## 2024-04-02 PROCEDURE — 255N000002 HC RX 255 OP 636

## 2024-04-02 RX ADMIN — PERFLUTREN 2 ML: 6.52 INJECTION, SUSPENSION INTRAVENOUS at 13:45

## 2024-04-11 ENCOUNTER — OFFICE VISIT (OUTPATIENT)
Dept: FAMILY MEDICINE | Facility: CLINIC | Age: 64
End: 2024-04-11
Payer: COMMERCIAL

## 2024-04-11 VITALS
DIASTOLIC BLOOD PRESSURE: 98 MMHG | HEART RATE: 87 BPM | SYSTOLIC BLOOD PRESSURE: 153 MMHG | BODY MASS INDEX: 35.94 KG/M2 | OXYGEN SATURATION: 95 % | WEIGHT: 216 LBS | RESPIRATION RATE: 18 BRPM | TEMPERATURE: 98.5 F

## 2024-04-11 DIAGNOSIS — R39.89 URINARY PROBLEM: ICD-10-CM

## 2024-04-11 DIAGNOSIS — M54.50 LUMBAR BACK PAIN: Primary | ICD-10-CM

## 2024-04-11 LAB
ALBUMIN UR-MCNC: NEGATIVE MG/DL
APPEARANCE UR: CLEAR
BILIRUB UR QL STRIP: NEGATIVE
COLOR UR AUTO: YELLOW
GLUCOSE UR STRIP-MCNC: NEGATIVE MG/DL
HGB UR QL STRIP: ABNORMAL
KETONES UR STRIP-MCNC: NEGATIVE MG/DL
LEUKOCYTE ESTERASE UR QL STRIP: NEGATIVE
NITRATE UR QL: NEGATIVE
PH UR STRIP: 5.5 [PH] (ref 5–8)
RBC #/AREA URNS AUTO: ABNORMAL /HPF
SP GR UR STRIP: 1.01 (ref 1–1.03)
SQUAMOUS #/AREA URNS AUTO: ABNORMAL /LPF
UROBILINOGEN UR STRIP-ACNC: 0.2 E.U./DL
WBC #/AREA URNS AUTO: ABNORMAL /HPF

## 2024-04-11 PROCEDURE — 99213 OFFICE O/P EST LOW 20 MIN: CPT | Performed by: FAMILY MEDICINE

## 2024-04-11 PROCEDURE — 81001 URINALYSIS AUTO W/SCOPE: CPT | Performed by: FAMILY MEDICINE

## 2024-04-11 RX ORDER — CYCLOBENZAPRINE HCL 5 MG
5 TABLET ORAL 3 TIMES DAILY PRN
Qty: 20 TABLET | Refills: 0 | Status: SHIPPED | OUTPATIENT
Start: 2024-04-11

## 2024-04-11 NOTE — PROGRESS NOTES
Assessment:       Lumbar back pain  - cyclobenzaprine (FLEXERIL) 5 MG tablet  Dispense: 20 tablet; Refill: 0    Urinary problem  - UA Macroscopic with reflex to Microscopic and Culture - Clinic Collect  - UA Microscopic with Reflex to Culture    Plan:     With lumbar back pain, likely musculoskeletal.  Recommend Tylenol, heating pad, cyclobenzaprine.  May try IcyHot.  Recommend staying active and gentle stretching exercises.  No radiculopathy symptoms.  No red flag symptoms.  UA is unremarkable.  Recommend pushing fluids and follow-up with PCP if symptoms getting worse or not improving in 1 week.    MEDICATIONS:   Orders Placed This Encounter   Medications    cyclobenzaprine (FLEXERIL) 5 MG tablet     Sig: Take 1 tablet (5 mg) by mouth 3 times daily as needed     Dispense:  20 tablet     Refill:  0       Subjective:       63 year old female presents for evaluation 1 day history of lumbar back pain across her entire low back.  It is worse with movement and getting up from a seated position.  It is better if she sits still.  She denies any numbness, weakness, pain, tingling down her extremities.  No bowel or bladder dysfunction.  No saddle anesthesia.  Had some occasional burning with urination and increased urinary frequency and wants to make sure she does not have a UTI.  No blood in her urine.    Patient Active Problem List   Diagnosis    Delusional disorder (H)    Mixed hyperlipidemia    Chronic pain syndrome    Obesity (BMI 35.0-39.9) with comorbidity (H)    Type 2 diabetes mellitus with complication, without long-term current use of insulin (H)    Chronic HFrEF (heart failure with reduced ejection fraction) (H)    History of tobacco abuse    ERNIE (obstructive sleep apnea)    Coronary artery disease involving native coronary artery of native heart without angina pectoris       Past Medical History:   Diagnosis Date    Depression     NSTEMI (non-ST elevated myocardial infarction) (H) 10/30/2019       Past Surgical  History:   Procedure Laterality Date    ANKLE FRACTURE SURGERY      CV CORONARY ANGIOGRAM N/A 10/31/2019    Procedure: Coronary Angiogram;  Surgeon: Chip Escalera MD;  Location: [facility];  Service: Cardiology       Current Outpatient Medications   Medication Sig Dispense Refill    acetaminophen (TYLENOL) 500 MG tablet [ACETAMINOPHEN (TYLENOL) 500 MG TABLET] Take 500 mg by mouth as needed for pain.      ADVAIR DISKUS 250-50 mcg/dose DISKUS [ADVAIR DISKUS 250-50 MCG/DOSE DISKUS] INHALE ONE PUFF BY MOUTH TWICE A DAY (Patient taking differently: as needed) 60 each 3    aspirin 81 mg chewable tablet [ASPIRIN 81 MG CHEWABLE TABLET] Chew 1 tablet (81 mg total) daily. (Patient taking differently: Take 162 mg by mouth daily Taking 2 81mg tablets) 30 tablet 0    atorvastatin (LIPITOR) 80 MG tablet TAKE ONE TABLET BY MOUTH AT BEDTIME 90 tablet 3    blood glucose (CONTOUR NEXT TEST) test strip 1 strip by In Vitro route daily 100 strip 1    cyclobenzaprine (FLEXERIL) 5 MG tablet Take 1 tablet (5 mg) by mouth 3 times daily as needed 20 tablet 0    ezetimibe (ZETIA) 10 MG tablet TAKE ONE TABLET BY MOUTH EVERY DAY 90 tablet 3    furosemide (LASIX) 20 MG tablet Take 1 tablet (20 mg) by mouth daily 90 tablet 4    Lancets (ONETOUCH DELICA PLUS [id number]) MISC USE TO TEST BLOOD SUGAR ONCE A  each 3    losartan (COZAAR) 25 MG tablet Take 1 tablet (25 mg) by mouth daily 90 tablet 1    metFORMIN (GLUCOPHAGE XR) 500 MG 24 hr tablet Take 4 tablets (2,000 mg) by mouth daily (with dinner) 360 tablet 3    metoprolol succinate ER (TOPROL XL) 25 MG 24 hr tablet Take 0.5 tablets (12.5 mg) by mouth daily 45 tablet 2    nitroGLYcerin (NITROSTAT) 0.4 MG sublingual tablet Place 1 tablet (0.4 mg) under the tongue every 5 minutes as needed for chest pain 25 tablet 1    ONETOUCH DELICA PLUS LANCET 33 gauge Misc [ONETOUCH DELICA PLUS LANCET 33 GAUGE MISC] USE TO TEST BLOOD SUGAR ONCE DAILY 100 each 3    senna (SENOKOT) 8.6 mg  tablet [SENNA (SENOKOT) 8.6 MG TABLET] Take 1 tablet by mouth daily as needed for constipation.      blood-glucose meter (CONTOUR NEXT METER) Misc [BLOOD-GLUCOSE METER (CONTOUR NEXT METER) MISC] Use 1 each As Directed daily. 1 each 0     No current facility-administered medications for this visit.       No Known Allergies    Family History   Problem Relation Age of Onset    Hypertension Mother     Heart Failure Mother     Dementia Mother     Heart Disease Mother     Hypertension Father     Heart Disease Father     Diabetes Father     Emphysema Father     Heart Disease Sister 58.00        4 vessel CBAG       Social History     Socioeconomic History    Marital status: Single     Spouse name: None    Number of children: None    Years of education: None    Highest education level: None   Tobacco Use    Smoking status: Former     Current packs/day: 0.10     Average packs/day: 0.1 packs/day for 20.0 years (2.0 ttl pk-yrs)     Types: Cigarettes    Smokeless tobacco: Never   Vaping Use    Vaping status: Never Used   Substance and Sexual Activity    Alcohol use: No     Comment: Alcoholic Drinks/day: h/o alcohol abuse     Drug use: No    Sexual activity: Never     Comment: single         Review of Systems  Pertinent items are noted in HPI.      Objective:     BP (!) 153/98   Pulse 87   Temp 98.5  F (36.9  C)   Resp 18   Wt 98 kg (216 lb)   SpO2 95%   BMI 35.94 kg/m       General appearance: alert, appears stated age, and cooperative  Back: No vertebral tenderness.  She does have some tenderness of the musculature across her low back.  No skin rash.  Lungs: clear to auscultation bilaterally  Heart: Regular rate and rhythm without murmurs.  Abdomen: soft, non-tender; bowel sounds normal; no masses,  no organomegaly  Extremities: Warm and well-perfused, straight leg raise is negative.  Neurologic: Alert and oriented X 3, normal strength and tone. Normal symmetric reflexes. Normal coordination and gait       Results for  orders placed or performed in visit on 04/11/24   UA Macroscopic with reflex to Microscopic and Culture - Clinic Collect     Status: Abnormal    Specimen: Urine, Midstream   Result Value Ref Range    Color Urine Yellow Colorless, Straw, Light Yellow, Yellow    Appearance Urine Clear Clear    Glucose Urine Negative Negative mg/dL    Bilirubin Urine Negative Negative    Ketones Urine Negative Negative mg/dL    Specific Gravity Urine 1.010 1.005 - 1.030    Blood Urine Trace (A) Negative    pH Urine 5.5 5.0 - 8.0    Protein Albumin Urine Negative Negative mg/dL    Urobilinogen Urine 0.2 0.2, 1.0 E.U./dL    Nitrite Urine Negative Negative    Leukocyte Esterase Urine Negative Negative   UA Microscopic with Reflex to Culture     Status: Abnormal   Result Value Ref Range    RBC Urine 0-2 0-2 /HPF /HPF    WBC Urine None Seen 0-5 /HPF /HPF    Squamous Epithelials Urine Few (A) None Seen /LPF    Narrative    Urine Culture not indicated       This note has been dictated using voice recognition software. Any grammatical or context distortions are unintentional and inherent to the software

## 2024-04-17 DIAGNOSIS — E11.9 TYPE 2 DIABETES MELLITUS (H): ICD-10-CM

## 2024-04-17 RX ORDER — METFORMIN HCL 500 MG
2000 TABLET, EXTENDED RELEASE 24 HR ORAL
Qty: 120 TABLET | Refills: 0 | Status: SHIPPED | OUTPATIENT
Start: 2024-04-17

## 2024-04-17 NOTE — TELEPHONE ENCOUNTER
Medication Question or Refill    Contacts         Type Contact Phone/Fax    04/17/2024 10:21 AM CDT Fax (Incoming) [facility], [address] [place] [address] (Pharmacy) [phone]            What medication are you calling about (include dose and sig)?: metFORMIN (GLUCOPHAGE XR) 500 MG 24 hr tablet     Preferred Pharmacy:  [address] [place] [address]  [address] [place] [address]  Phone: [phone] Fax: [phone]      Controlled Substance Agreement on file:   CSA -- Patient Level:    CSA: None found at the patient level.       Who prescribed the medication?: [place]    Do you need a refill? Yes    When did you use the medication last? N/A

## 2024-04-17 NOTE — TELEPHONE ENCOUNTER
Please call patient.  Last office visit greater than 1 year ago, needs follow-up for diabetes.  Physical would be appropriate.

## 2024-04-22 ENCOUNTER — OFFICE VISIT (OUTPATIENT)
Dept: FAMILY MEDICINE | Facility: CLINIC | Age: 64
End: 2024-04-22
Payer: COMMERCIAL

## 2024-04-22 VITALS
RESPIRATION RATE: 16 BRPM | HEART RATE: 78 BPM | WEIGHT: 213.4 LBS | SYSTOLIC BLOOD PRESSURE: 132 MMHG | HEIGHT: 65 IN | TEMPERATURE: 97.9 F | OXYGEN SATURATION: 98 % | BODY MASS INDEX: 35.56 KG/M2 | DIASTOLIC BLOOD PRESSURE: 72 MMHG

## 2024-04-22 DIAGNOSIS — E66.01 CLASS 2 SEVERE OBESITY WITH SERIOUS COMORBIDITY AND BODY MASS INDEX (BMI) OF 35.0 TO 35.9 IN ADULT, UNSPECIFIED OBESITY TYPE (H): ICD-10-CM

## 2024-04-22 DIAGNOSIS — E11.59 TYPE 2 DIABETES MELLITUS WITH OTHER CIRCULATORY COMPLICATION, WITHOUT LONG-TERM CURRENT USE OF INSULIN (H): ICD-10-CM

## 2024-04-22 DIAGNOSIS — G89.4 CHRONIC PAIN SYNDROME: ICD-10-CM

## 2024-04-22 DIAGNOSIS — Z12.4 CERVICAL CANCER SCREENING: ICD-10-CM

## 2024-04-22 DIAGNOSIS — I25.10 CORONARY ARTERY DISEASE INVOLVING NATIVE CORONARY ARTERY OF NATIVE HEART WITHOUT ANGINA PECTORIS: ICD-10-CM

## 2024-04-22 DIAGNOSIS — Z12.31 ENCOUNTER FOR SCREENING MAMMOGRAM FOR MALIGNANT NEOPLASM OF BREAST: ICD-10-CM

## 2024-04-22 DIAGNOSIS — Z12.11 SCREEN FOR COLON CANCER: ICD-10-CM

## 2024-04-22 DIAGNOSIS — Z87.891 PERSONAL HISTORY OF TOBACCO USE: ICD-10-CM

## 2024-04-22 DIAGNOSIS — Z00.00 ROUTINE PHYSICAL EXAMINATION: Primary | ICD-10-CM

## 2024-04-22 DIAGNOSIS — E78.2 MIXED HYPERLIPIDEMIA: ICD-10-CM

## 2024-04-22 DIAGNOSIS — I50.22 CHRONIC HFREF (HEART FAILURE WITH REDUCED EJECTION FRACTION) (H): ICD-10-CM

## 2024-04-22 DIAGNOSIS — E66.812 CLASS 2 SEVERE OBESITY WITH SERIOUS COMORBIDITY AND BODY MASS INDEX (BMI) OF 35.0 TO 35.9 IN ADULT, UNSPECIFIED OBESITY TYPE (H): ICD-10-CM

## 2024-04-22 DIAGNOSIS — F22 DELUSIONAL DISORDER (H): ICD-10-CM

## 2024-04-22 LAB
ALBUMIN SERPL BCG-MCNC: 4.8 G/DL (ref 3.5–5.2)
ALP SERPL-CCNC: 77 U/L (ref 40–150)
ALT SERPL W P-5'-P-CCNC: 17 U/L (ref 0–50)
ANION GAP SERPL CALCULATED.3IONS-SCNC: 16 MMOL/L (ref 7–15)
AST SERPL W P-5'-P-CCNC: 19 U/L (ref 0–45)
BILIRUB SERPL-MCNC: 0.4 MG/DL
BUN SERPL-MCNC: 15.7 MG/DL (ref 8–23)
CALCIUM SERPL-MCNC: 9.8 MG/DL (ref 8.8–10.2)
CHLORIDE SERPL-SCNC: 103 MMOL/L (ref 98–107)
CHOLEST SERPL-MCNC: 139 MG/DL
CREAT SERPL-MCNC: 0.66 MG/DL (ref 0.51–0.95)
CREAT UR-MCNC: 28.6 MG/DL
DEPRECATED HCO3 PLAS-SCNC: 19 MMOL/L (ref 22–29)
EGFRCR SERPLBLD CKD-EPI 2021: >90 ML/MIN/1.73M2
ERYTHROCYTE [DISTWIDTH] IN BLOOD BY AUTOMATED COUNT: 11.8 % (ref 10–15)
GLUCOSE SERPL-MCNC: 174 MG/DL (ref 70–99)
HBA1C MFR BLD: 7.7 % (ref 0–5.6)
HCT VFR BLD AUTO: 36.9 % (ref 35–47)
HDLC SERPL-MCNC: 40 MG/DL
HGB BLD-MCNC: 12.7 G/DL (ref 11.7–15.7)
HOLD SPECIMEN: NORMAL
HOLD SPECIMEN: NORMAL
LDLC SERPL CALC-MCNC: 68 MG/DL
MCH RBC QN AUTO: 32.2 PG (ref 26.5–33)
MCHC RBC AUTO-ENTMCNC: 34.4 G/DL (ref 31.5–36.5)
MCV RBC AUTO: 93 FL (ref 78–100)
MICROALBUMIN UR-MCNC: <12 MG/L
MICROALBUMIN/CREAT UR: NORMAL MG/G{CREAT}
NONHDLC SERPL-MCNC: 99 MG/DL
PLATELET # BLD AUTO: 267 10E3/UL (ref 150–450)
POTASSIUM SERPL-SCNC: 4.1 MMOL/L (ref 3.4–5.3)
PROT SERPL-MCNC: 7.1 G/DL (ref 6.4–8.3)
RBC # BLD AUTO: 3.95 10E6/UL (ref 3.8–5.2)
SODIUM SERPL-SCNC: 138 MMOL/L (ref 135–145)
TRIGL SERPL-MCNC: 155 MG/DL
WBC # BLD AUTO: 7.2 10E3/UL (ref 4–11)

## 2024-04-22 PROCEDURE — 99214 OFFICE O/P EST MOD 30 MIN: CPT | Mod: 25 | Performed by: FAMILY MEDICINE

## 2024-04-22 PROCEDURE — 80053 COMPREHEN METABOLIC PANEL: CPT | Performed by: FAMILY MEDICINE

## 2024-04-22 PROCEDURE — 36415 COLL VENOUS BLD VENIPUNCTURE: CPT | Performed by: FAMILY MEDICINE

## 2024-04-22 PROCEDURE — 83036 HEMOGLOBIN GLYCOSYLATED A1C: CPT | Performed by: FAMILY MEDICINE

## 2024-04-22 PROCEDURE — 82570 ASSAY OF URINE CREATININE: CPT | Performed by: FAMILY MEDICINE

## 2024-04-22 PROCEDURE — 82043 UR ALBUMIN QUANTITATIVE: CPT | Performed by: FAMILY MEDICINE

## 2024-04-22 PROCEDURE — 80061 LIPID PANEL: CPT | Performed by: FAMILY MEDICINE

## 2024-04-22 PROCEDURE — 85027 COMPLETE CBC AUTOMATED: CPT | Performed by: FAMILY MEDICINE

## 2024-04-22 PROCEDURE — G0439 PPPS, SUBSEQ VISIT: HCPCS | Performed by: FAMILY MEDICINE

## 2024-04-22 SDOH — HEALTH STABILITY: PHYSICAL HEALTH: ON AVERAGE, HOW MANY DAYS PER WEEK DO YOU ENGAGE IN MODERATE TO STRENUOUS EXERCISE (LIKE A BRISK WALK)?: 0 DAYS

## 2024-04-22 ASSESSMENT — SOCIAL DETERMINANTS OF HEALTH (SDOH): HOW OFTEN DO YOU GET TOGETHER WITH FRIENDS OR RELATIVES?: ONCE A WEEK

## 2024-04-22 ASSESSMENT — PAIN SCALES - GENERAL: PAINLEVEL: NO PAIN (0)

## 2024-04-22 NOTE — COMMUNITY RESOURCES LIST (ENGLISH)
April 22, 2024           YOUR PERSONALIZED LIST OF SERVICES & PROGRAMS           & RECREATION    Sports      [facility] - Coaching & Training Services  [address] [place] [address] (Distance: 2.0 miles)  Phone: [phone]  Website: https://wwwBitpagos/training/  Language: English      [facility] [place] - Sports clubs and recreational activities  [address] [place] [address] (Distance: 2.9 miles)  Language: English  Fee: Free, Self pay  Accessibility: Ada accessible      [facility] - Adult Enrichment  Phone: [phone]  Website: [email or web address]-[email or web address]-[email or web address]  Language: English  Hours: Mon 7:30 AM - 4:00 PM Tue 7:30 AM - 4:00 PM Wed 7:30 AM - 4:00 PM Thu 7:30 AM - 4:00 PM Fri 7:30 AM - 4:00 PM    Classes/Groups      F[employer] - Coaching & Training Services  [address] [place] [address] (Distance: 2.0 miles)  Phone: [phone]  Website: [email or web address]://wwwBitpagos/training/  Language: English      [facility] - Personal training  [address] [place] [address] (Distance: 24.8 miles)  Phone: [phone]  Website: https://www.Doximity/  Language: English, Turkish  Fee: Sliding scale, Self pay, Insurance      [facility] - Care Coordination (Healthcare only)  Phone: [phone]  Website: [email or web address]  Language: English, Turkish  Hours: Wed 9:00 AM - 11:30 AM Thu 1:00 PM - 4:00 PM, 5:30 PM - 7:00 PM               IMPORTANT NUMBERS & WEBSITES        Emergency Services  911  .   United Way  211 http://211unitedway.org  .   Poison Control  (927) 188-3699 http://[place]poison.org http://wis[place]poison.org  .     Suicide and Crisis Lifeline  988 http://988lifeline.org  .   Childhelp National Child Abuse Hotline  760.866.3195 http://Childhelphotline.org   .   National Sexual Assault Hotline  (768) 626-9083 (HOPE) http://Rainn.org   .     National Runaway Safeline  (993)  582-1369 (RUNAWAY) http://Pfeffermind GamesruNBO TV.org  .   Pregnancy & Postpartum Support  Call/text 293-709-1747  [place]: http://ppsupportmn.org  [place]: http://psichapters.com/wi  .   Substance Abuse National Helpline ([facility])  754-953-HELP (8987) http://Findtreatment.gov   .                DISCLAIMER: These resources have been generated via the Ahead Platform. Ahead does not endorse any service providers mentioned in this resource list. Ahead does not guarantee that the services mentioned in this resource list will be available to you or will improve your health or wellness.    [facility]

## 2024-04-22 NOTE — PATIENT INSTRUCTIONS
You are due for your eye exam.  Please schedule a visit for your yearly diabetes eye exam.    Schedule your screening mammogram and screening lung cancer CT scan.    Begin Jardiance 10 mg daily in the morning.  In 4 weeks, return for a lab only visit to check your kidney function and electrolytes at which time we will increase dose of all looks good.  Do a nurse only blood pressure check at the same visit, bring in your home blood pressure cuff to ensure it is accurate.    I recommend that you receive the pneumonia shot and the tetanus booster at your pharmacy.  You should also consider the COVID-vaccine, shingles vaccine, and RSV vaccine.      Lung Cancer Screening   Frequently Asked Questions  If you are at high-risk for lung cancer, getting screened with low-dose computed tomography (LDCT) every year can help save your life. This handout offers answers to some of the most common questions about lung cancer screening. If you have other questions, please call [phone] ([phone]).     What is it?  Lung cancer screening uses special X-ray technology to create an image of your lung tissue. The exam is quick and easy and takes less than 10 seconds. We don t give you any medicine or use any needles. You can eat before and after the exam. You don t need to change your clothes as long as the clothing on your chest doesn t contain metal. But, you do need to be able to hold your breath for at least 6 seconds during the exam.    What is the goal of lung cancer screening?  The goal of lung cancer screening is to save lives. Many times, lung cancer is not found until a person starts having physical symptoms. Lung cancer screening can help detect lung cancer in the earliest stages when it may be easier to treat.    Who should be screened for lung cancer?  We suggest lung cancer screening for anyone who is at high-risk for lung cancer. You are in the high-risk group if you:     are between the ages of 55 and 79,  and   have smoked at least 1 pack of cigarettes a day for 20 or more years, and   still smoke or have quit within the past 15 years.    However, if you have a new cough or shortness of breath, you should talk to your doctor before being screened.    Why does it matter if I have symptoms?  Certain symptoms can be a sign that you have a condition in your lungs that should be checked and treated by your doctor. These symptoms include fever, chest pain, a new or changing cough, shortness of breath that you have never felt before, coughing up blood or unexplained weight loss. Having any of these symptoms can greatly affect the results of lung cancer screening.       Should all smokers get an LDCT lung cancer screening exam?  It depends. Lung cancer screening is for a very specific group of men and women who have a history of heavy smoking over a long period of time (see  Who should be screened for lung cancer  above).  I am in the high-risk group, but have been diagnosed with cancer in the past. Is LDCT lung cancer screening right for me?  In some cases, you should not have LDCT lung screening, such as when your doctor is already following your cancer with CT scan studies. Your doctor will help you decide if LDCT lung screening is right for you.  Do I need to have a screening exam every year?  Yes. If you are in the high-risk group described earlier, you should get an LDCT lung cancer screening exam every year until you are 79, or are no longer willing or able to undergo screening and possible procedures to diagnose and treat lung cancer.  How effective is LDCT at preventing death from lung cancer?  Studies have shown that LDCT lung cancer screening can lower the risk of death from lung cancer by 20 percent in people who are at high-risk.  What are the risks?  There are some risks and limitations of LDCT lung cancer screening. We want to make sure you understand the risks and benefits, so please let us know if you have  any questions. Your doctor may want to talk with you more about these risks.   Radiation exposure: As with any exam that uses radiation, there is a very small increased risk of cancer. The amount of radiation in LDCT is small--about the same amount a person would get from a mammogram. Your doctor orders the exam when he or she feels the potential benefits outweigh the risks.   False negatives: No test is perfect, including LDCT. It is possible that you may have a medical condition, including lung cancer, that is not found during your exam. This is called a false negative result.   False positives and more testing: LDCT very often finds something in the lung that could be cancer, but in fact is not. This is called a false positive result. False positive tests often cause anxiety. To make sure these findings are not cancer, you may need to have more tests. These tests will be done only if you give us permission. Sometimes patients need a treatment that can have side effects, such as a biopsy. For more information on false positives, see  What can I expect from the results?    Findings not related to lung cancer: Your LDCT exam also takes pictures of areas of your body next to your lungs. In a very small number of cases, the CT scan will show an abnormal finding in one of these areas, such as your kidneys, adrenal glands, liver or thyroid. This finding may not be serious, but you may need more tests. Your doctor can help you decide what other tests you may need, if any.  What can I expect from the results?  About 1 out of 4 LDCT exams will find something that may need more tests. Most of the time, these findings are lung nodules. Lung nodules are very small collections of tissue in the lung. These nodules are very common, and the vast majority--more than 97 percent--are not cancer (benign). Most are normal lymph nodes or small areas of scarring from past infections.  But, if a small lung nodule is found to be cancer,  the cancer can be cured more than 90 percent of the time. To know if the nodule is cancer, we may need to get more images before your next yearly screening exam. If the nodule has suspicious features (for example, it is large, has an odd shape or grows over time), we will refer you to a specialist for further testing.  Will my doctor also get the results?  Yes. Your doctor will get a copy of your results.  Is it okay to keep smoking now that there s a cancer screening exam?  No. Tobacco is one of the strongest cancer-causing agents. It causes not only lung cancer, but other cancers and cardiovascular (heart) diseases as well. The damage caused by smoking builds over time. This means that the longer you smoke, the higher your risk of disease. While it is never too late to quit, the sooner you quit, the better.  Where can I find help to quit smoking?  The best way to prevent lung cancer is to stop smoking. If you have already quit smoking, congratulations and keep it up! For help on quitting smoking, please call [facility] at 9-530-QUITNOW (1-411.875.7675) or the American [employer] Society at 1-239.907.6319 to find local resources near you.  One-on-one health coaching:  If you d prefer to work individually with a health care provider on tobacco cessation, we offer:     Medication Therapy Management:  Our specially trained pharmacists work closely with you and your doctor to help you quit smoking.  Call [phone] or [phone] (toll free).    Preventive Care Advice   This is general advice given by our system to help you stay healthy. However, your care team may have specific advice just for you. Please talk to your care team about your preventive care needs.  Nutrition  Eat 5 or more servings of fruits and vegetables each day.  Try wheat bread, brown rice and whole grain pasta (instead of white bread, rice, and pasta).  Get enough calcium and vitamin D. Check the label on foods and aim for 100% of the RDA  (recommended daily allowance).  Lifestyle  Exercise at least 150 minutes each week   (30 minutes a day, 5 days a week).  Do muscle strengthening activities 2 days a week. These help control your weight and prevent disease.  No smoking.  Wear sunscreen to prevent skin cancer.  Have a dental exam and cleaning every 6 months.  Yearly exams  See your health care team every year to talk about:  Any changes in your health.  Any medicines your care team has prescribed.  Preventive care, family planning, and ways to prevent chronic diseases.  Shots (vaccines)   HPV shots (up to age 26), if you've never had them before.  Hepatitis B shots (up to age 59), if you've never had them before.  COVID-19 shot: Get this shot when it's due.  Flu shot: Get a flu shot every year.  Tetanus shot: Get a tetanus shot every 10 years.  Pneumococcal, hepatitis A, and RSV shots: Ask your care team if you need these based on your risk.  Shingles shot (for age 50 and up).  General health tests  Diabetes screening:  Starting at age 35, Get screened for diabetes at least every 3 years.  If you are younger than age 35, ask your care team if you should be screened for diabetes.  Cholesterol test: At age 39, start having a cholesterol test every 5 years, or more often if advised.  Bone density scan (DEXA): At age 50, ask your care team if you should have this scan for osteoporosis (brittle bones).  Hepatitis C: Get tested at least once in your life.  STIs (sexually transmitted infections)  Before age 24: Ask your care team if you should be screened for STIs.  After age 24: Get screened for STIs if you're at risk. You are at risk for STIs (including HIV) if:  You are sexually active with more than one person.  You don't use condoms every time.  You or a partner was diagnosed with a sexually transmitted infection.  If you are at risk for HIV, ask about PrEP medicine to prevent HIV.  Get tested for HIV at least once in your life, whether you are at risk  for HIV or not.  Cancer screening tests  Cervical cancer screening: If you have a cervix, begin getting regular cervical cancer screening tests at age 21. Most people who have regular screenings with normal results can stop after age 65. Talk about this with your provider.  Breast cancer scan (mammogram): If you've ever had breasts, begin having regular mammograms starting at age 40. This is a scan to check for breast cancer.  Colon cancer screening: It is important to start screening for colon cancer at age 45.  Have a colonoscopy test every 10 years (or more often if you're at risk) Or, ask your provider about stool tests like a FIT test every year or Cologuard test every 3 years.  To learn more about your testing options, visit: [email or web address]://[email or web address]/[email or web address].  For help making a decision, visit: https://bit.Zhaopin/ob08110.  Prostate cancer screening test: If you have a prostate and are age 55 to 69, ask your provider if you would benefit from a yearly prostate cancer screening test.  Lung cancer screening: If you are a current or former smoker age 50 to 80, ask your care team if ongoing lung cancer screenings are right for you.  For informational purposes only. Not to replace the advice of your health care provider. Copyright   2023 [facility]. All rights reserved. Clinically reviewed by the [facility]. [employer] [id number] - REV 01/24.    Learning About Activities of Daily Living  What are activities of daily living?     Activities of daily living (ADLs) are the basic self-care tasks you do every day. These include eating, bathing, dressing, and moving around.  As you age, and if you have health problems, you may find that it's harder to do some of these tasks. If so, your doctor can suggest ideas that may help.  To measure what kind of help you may need, your doctor will ask how well you are able to do ADLs. Let your doctor know if there are any tasks that you are having  trouble doing. This is an important first step to getting help. And when you have the help you need, you can stay as independent as possible.  How will a doctor assess your ADLs?  Asking about ADLs is part of a routine health checkup your doctor will likely do as you age. Your health check might be done in a doctor's office, in your home, or at a hospital. The goal is to find out if you are having any problems that could make it hard to care for yourself or that make it unsafe for you to be on your own.  To measure your ADLs, your doctor will ask how hard it is for you to do routine tasks. Your doctor may also want to know if you have changed the way you do a task because of a health problem. Your doctor may watch how you:  Walk back and forth.  Keep your balance while you stand or walk.  Move from sitting to standing or from a bed to a chair.  Button or unbutton a shirt or sweater.  Remove and put on your shoes.  It's common to feel a little worried or anxious if you find you can't do all the things you used to be able to do. Talking with your doctor about ADLs is a way to make sure you're as safe as possible and able to care for yourself as well as you can. You may want to bring a caregiver, friend, or family member to your checkup. They can help you talk to your doctor.  Follow-up care is a key part of your treatment and safety. Be sure to make and go to all appointments, and call your doctor if you are having problems. It's also a good idea to know your test results and keep a list of the medicines you take.  Current as of: October 24, 2023               Content Version: 14.0    4735-8377 [email or web address].   Care instructions adapted under license by your healthcare professional. If you have questions about a medical condition or this instruction, always ask your healthcare professional. [email or web address] disclaims any warranty or liability for your use of this information.      Preventing Falls:  Care Instructions  Injuries and health problems such as trouble walking or poor eyesight can increase your risk of falling. So can some medicines. But there are things you can do to help prevent falls. You can exercise to get stronger. You can also arrange your home to make it safer.    Talk to your doctor about the medicines you take. Ask if any of them increase the risk of falls and whether they can be changed or stopped.   Try to exercise regularly. It can help improve your strength and balance. This can help lower your risk of falling.     Practice fall safety and prevention.    Wear low-heeled shoes that fit well and give your feet good support. Talk to your doctor if you have foot problems that make this hard.  Carry a cellphone or wear a medical alert device that you can use to call for help.  Use stepladders instead of chairs to reach high objects. Don't climb if you're at risk for falls. Ask for help, if needed.  Wear the correct eyeglasses, if you need them.    Make your home safer.    Remove rugs, cords, clutter, and furniture from walkways.  Keep your house well lit. Use night-lights in hallways and bathrooms.  Install and use sturdy handrails on stairways.  Wear nonskid footwear, even inside. Don't walk barefoot or in socks without shoes.    Be safe outside.    Use handrails, curb cuts, and ramps whenever possible.  Keep your hands free by using a shoulder bag or backpack.  Try to walk in well-lit areas. Watch out for uneven ground, changes in pavement, and debris.  Be careful in the winter. Walk on the grass or gravel when sidewalks are slippery. Use de-icer on steps and walkways. Add non-slip devices to shoes.    Put grab bars and nonskid mats in your shower or tub and near the toilet. Try to use a shower chair or bath bench when bathing.   Get into a tub or shower by putting in your weaker leg first. Get out with your strong side first. Have a phone or medical alert device in the bathroom with you.  "  Where can you learn more?  Go to https://www.Jia.com.net/patiented  Enter G117 in the search box to learn more about \"Preventing Falls: Care Instructions.\"  Current as of: July 17, 2023               Content Version: 14.0    8639-7634 [employer].   Care instructions adapted under license by your healthcare professional. If you have questions about a medical condition or this instruction, always ask your healthcare professional. [employer] disclaims any warranty or liability for your use of this information.      Learning About Stress  What is stress?     Stress is your body's response to a hard situation. Your body can have a physical, emotional, or mental response. Stress is a fact of life for most people, and it affects everyone differently. What causes stress for you may not be stressful for someone else.  A lot of things can cause stress. You may feel stress when you go on a job interview, take a test, or run a race. This kind of short-term stress is normal and even useful. It can help you if you need to work hard or react quickly. For example, stress can help you finish an important job on time.  Long-term stress is caused by ongoing stressful situations or events. Examples of long-term stress include long-term health problems, ongoing problems at work, or conflicts in your family. Long-term stress can harm your health.  How does stress affect your health?  When you are stressed, your body responds as though you are in danger. It makes hormones that speed up your heart, make you breathe faster, and give you a burst of energy. This is called the fight-or-flight stress response. If the stress is over quickly, your body goes back to normal and no harm is done.  But if stress happens too often or lasts too long, it can have bad effects. Long-term stress can make you more likely to get sick, and it can make symptoms of some diseases worse. If you tense up when you are stressed, you " may develop neck, shoulder, or low back pain. Stress is linked to high blood pressure and heart disease.  Stress also harms your emotional health. It can make you [place], tense, or depressed. Your relationships may suffer, and you may not do well at work or school.  What can you do to manage stress?  You can try these things to help manage stress:   Do something active. Exercise or activity can help reduce stress. Walking is a great way to get started. Even everyday activities such as housecleaning or yard work can help.  Try yoga or morena chi. These techniques combine exercise and meditation. You may need some training at first to learn them.  Do something you enjoy. For example, listen to music or go to a movie. Practice your hobby or do volunteer work.  Meditate. This can help you relax, because you are not worrying about what happened before or what may happen in the future.  Do guided imagery. Imagine yourself in any setting that helps you feel calm. You can use online videos, books, or a teacher to guide you.  Do breathing exercises. For example:  From a standing position, bend forward from the waist with your knees slightly bent. Let your arms dangle close to the floor.  Breathe in slowly and deeply as you return to a standing position. Roll up slowly and lift your head last.  Hold your breath for just a few seconds in the standing position.  Breathe out slowly and bend forward from the waist.  Let your feelings out. Talk, laugh, cry, and express anger when you need to. Talking with supportive friends or family, a counselor, or a yvonne leader about your feelings is a healthy way to relieve stress. Avoid discussing your feelings with people who make you feel worse.  Write. It may help to write about things that are bothering you. This helps you find out how much stress you feel and what is causing it. When you know this, you can find better ways to cope.  What can you do to prevent stress?  You might try some of  "these things to help prevent stress:  Manage your time. This helps you find time to do the things you want and need to do.  Get enough sleep. Your body recovers from the stresses of the day while you are sleeping.  Get support. Your family, friends, and community can make a difference in how you experience stress.  Limit your news feed. Avoid or limit time on social media or news that may make you feel stressed.  Do something active. Exercise or activity can help reduce stress. Walking is a great way to get started.  Where can you learn more?  Go to https://www.Ultius.net/patiented  Enter N032 in the search box to learn more about \"Learning About Stress.\"  Current as of: October 24, 2023               Content Version: 14.0    0654-5056 [employer].   Care instructions adapted under license by your healthcare professional. If you have questions about a medical condition or this instruction, always ask your healthcare professional. [employer] disclaims any warranty or liability for your use of this information.      Bladder Training: Care Instructions  Your Care Instructions     Bladder training is used to treat urge incontinence and stress incontinence. Urge incontinence means that the need to urinate comes on so fast that you can't get to a toilet in time. Stress incontinence means that you leak urine because of pressure on your bladder. For example, it may happen when you laugh, cough, or lift something heavy.  Bladder training can increase how long you can wait before you have to urinate. It can also help your bladder hold more urine. And it can give you better control over the urge to urinate.  It is important to remember that bladder training takes a few weeks to a few months to make a difference. You may not see results right away, but don't give up.  Follow-up care is a key part of your treatment and safety. Be sure to make and go to all appointments, and call your doctor if you " are having problems. It's also a good idea to know your test results and keep a list of the medicines you take.  How can you care for yourself at home?  Work with your doctor to come up with a bladder training program that is right for you. You may use one or more of the following methods.  Delayed urination  In the beginning, try to keep from urinating for 5 minutes after you first feel the need to go.  While you wait, take deep, slow breaths to relax. Kegel exercises can also help you delay the need to go to the bathroom.  After some practice, when you can easily wait 5 minutes to urinate, try to wait 10 minutes before you urinate.  Slowly increase the waiting period until you are able to control when you have to urinate.  Scheduled urination  Empty your bladder when you first wake up in the morning.  Schedule times throughout the day when you will urinate.  Start by going to the bathroom every hour, even if you don't need to go.  Slowly increase the time between trips to the bathroom.  When you have found a schedule that works well for you, keep doing it.  If you wake up during the night and have to urinate, do it. Apply your schedule to waking hours only.  Kegel exercises  These tighten and strengthen pelvic muscles, which can help you control the flow of urine. (If doing these exercises causes pain, stop doing them and talk with your doctor.) To do Kegel exercises:  Squeeze your muscles as if you were trying not to pass gas. Or squeeze your muscles as if you were stopping the flow of urine. Your belly, legs, and buttocks shouldn't move.  Hold the squeeze for 3 seconds, then relax for 5 to 10 seconds.  Start with 3 seconds, then add 1 second each week until you are able to squeeze for 10 seconds.  Repeat the exercise 10 times a session. Do 3 to 8 sessions a day.  When should you call for help?  Watch closely for changes in your health, and be sure to contact your doctor if:    Your incontinence is getting worse.  "    You do not get better as expected.   Where can you learn more?  Go to https://www.[employer].net/patiented  Enter V684 in the search box to learn more about \"Bladder Training: Care Instructions.\"  Current as of: November 15, 2023               Content Version: 14.0    4188-7553 [employer].   Care instructions adapted under license by your healthcare professional. If you have questions about a medical condition or this instruction, always ask your healthcare professional. Healthwise, [employer] disclaims any warranty or liability for your use of this information.      "

## 2024-04-22 NOTE — PROGRESS NOTES
Preventive Care Visit  [facility]  Lucretia Raya MD, Family Medicine  Apr 22, 2024      Assessment & Plan     Routine physical examination  Encouraged healthy lifestyle habits including regular exercise, healthy eating habits, and adequate calcium and vitamin D intake.  She is agreeable to mammogram.  She declines Pap smear screening, colon cancer screening.  She will consider vaccines PCV 20 and Tdap but declines them today, is not interested in flu, COVID, Shingrix, RSV.  Discussed lung cancer screening, she is interested in proceeding with this.    Cervical cancer screening  She declines    Encounter for screening mammogram for malignant neoplasm of breast  - *MA Screening Digital Bilateral; Future    Screen for colon cancer  Patient declines    Type 2 diabetes mellitus with other circulatory complication, without long-term current use of insulin (H)  A1c at goal of less than 8 in the setting of coronary artery disease, good compliance with metformin, history of heart failure with reduced ejection fraction and therefore could also benefit from initiation of Jardiance.  This will be initiated.  Notify of side effects.  Will check urine microalbumin, kidney and liver function today.  - Hemoglobin A1c; Standing  - Albumin Random Urine Quantitative with Creat Ratio; Future  - Hemoglobin A1c  - Albumin Random Urine Quantitative with Creat Ratio  - empagliflozin (JARDIANCE) 10 MG TABS tablet; Take 1 tablet (10 mg) by mouth daily  - Comprehensive metabolic panel; Future  - Basic metabolic panel  (Ca, Cl, CO2, Creat, Gluc, K, Na, BUN); Future  - Comprehensive metabolic panel    Coronary artery disease involving native coronary artery of native heart without angina pectoris  No evidence of recurrence.  Continue aspirin, statin, risk factor modification.  - Comprehensive metabolic panel; Future  - Lipid panel reflex to direct LDL Fasting; Future  - Lipid panel reflex to direct LDL Fasting  -  "Comprehensive metabolic panel    Chronic HFrEF (heart failure with reduced ejection fraction) (H)  Euvolemic.  Will check kidney function and electrolytes, CBC.  Initiate Jardiance once daily.  Return for basic metabolic panel in 1 month for monitoring of this medication.  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  She is functioning well, is understanding of medical perspective that this is delusional though she does not agree.  Not requiring further intervention.  I offer my support.    Mixed hyperlipidemia  Continue atorvastatin and Zetia.  We will check lipids and liver function today.    Class II obesity BMI 35 with comorbidity (H)  Encouraged healthy lifestyle habits.  Weight management could assist in management of diabetes, coronary artery disease, dyslipidemia, sleep apnea    Chronic pain syndrome  Chronic and stable.  This is associated with her delusional disorder as above as she believes it is related to previous radiation injury.  I am suspicious of fibromyalgia.  She is not interested in further treatments.    Personal history of tobacco use  Discussed and offered lung cancer screening with CT, she is interested.  Advised tobacco cessation, offered assistance, she declines.  - Prof fee: Shared Decision Making for Lung Cancer Screening  - CT Chest Lung Cancer Scrn Low Dose wo; Future              BMI  Estimated body mass index is 35.51 kg/m  as calculated from the following:    Height as of this encounter: 1.651 m (5' 5\").    Weight as of this encounter: 96.8 kg (213 lb 6.4 oz).       Counseling  Appropriate preventive services were discussed with this patient, including applicable screening as appropriate for fall " prevention, nutrition, physical activity, Tobacco-use cessation, weight loss and cognition.  Checklist reviewing preventive services available has been given to the patient.  Reviewed patient's diet, addressing concerns and/or questions.   The patient was instructed to see the dentist every 6 months.   She is at risk for psychosocial distress and has been provided with information to reduce risk.   Updated plan of care.  Patient reported difficulty with activities of daily living were addressed today.Information on urinary incontinence and treatment options given to patient.           Mirza Dominique is a 63 year old, presenting for the following:  Physical (fasting) and Blood Pressure Check (Has been running elevated at home. )          Health Care Directive  Patient does not have a Health Care Directive or Living Will: Discussed advance care planning with patient; information given to patient to review.    Seen in clinic today for routine preventive care visit.  She has no specific questions or concerns.  History of type 2 diabetes managed with metformin 2000 mg daily.  Tolerating well.  She does not check blood sugars regularly.  Denies low sugars.  History of coronary artery disease, denies chest pain, palpitations, shortness of breath, or exertional symptoms.  Remains on metoprolol, losartan, atorvastatin, aspirin, and Zetia.  History of heart failure with reduced ejection fraction of 45 to 50%.  She denies shortness of breath, orthopnea, edema and feels euvolemic.  Continues to smoke, not interested in quitting.  She is agreeable to lung cancer screening.  Remains on atorvastatin and Zetia and management of dyslipidemia.  History of delusional disorder related to radiation injury from tanning bed, perception that she does not sleep.  This is stable.  She continues to struggle with diffuse pain she attributes to this injury.            4/22/2024   General Health   How would you rate your overall physical  health? (!) POOR   Feel stress (tense, anxious, or unable to sleep) Only a little   (!) STRESS CONCERN      4/22/2024   Nutrition   Diet: Low salt    Low fat/cholesterol    Diabetic         4/22/2024   Exercise   Days per week of moderate/strenous exercise 0 days   (!) EXERCISE CONCERN      4/22/2024   Social Factors   Frequency of gathering with friends or relatives Once a week   Worry food won't last until get money to buy more No   Food not last or not have enough money for food? No   Do you have housing?  Yes   Are you worried about losing your housing? No   Lack of transportation? No   Unable to get utilities (heat,electricity)? No         4/22/2024   Fall Risk   Fallen 2 or more times in the past year? No   Trouble with walking or balance? Yes   Gait Speed Test (Document in seconds) 4.5   Gait Speed Test Interpretation Less than or equal to 5.00 seconds - PASS          4/22/2024   Activities of Daily Living- Home Safety   Needs help with the following daily activites Shopping    Housework   Safety concerns in the home None of the above         4/22/2024   Dental   Dentist two times every year? (!) NO         4/22/2024   Hearing Screening   Hearing concerns? None of the above         4/22/2024   Driving Risk Screening   Patient/family members have concerns about driving (!) DECLINE         4/22/2024   General Alertness/Fatigue Screening   Have you been more tired than usual lately? No         4/22/2024   Urinary Incontinence Screening   Bothered by leaking urine in past 6 months Yes         4/22/2024   TB Screening   Were you born outside of the [place]? No         Today's PHQ-2 Score:       4/22/2024     1:58 PM   PHQ-2 ( 1999 [employer])   Q1: Little interest or pleasure in doing things 0   Q2: Feeling down, depressed or hopeless 0   PHQ-2 Score 0   Q1: Little interest or pleasure in doing things Not at all   Q2: Feeling down, depressed or hopeless Not at all   PHQ-2 Score 0           4/22/2024   Substance Use    Alcohol more than 3/day or more than 7/wk Not Applicable   Do you have a current opioid prescription? No   How severe/bad is pain from 1 to 10? 9/10   Do you use any other substances recreationally? No     Social History     Tobacco Use    Smoking status: Former     Current packs/day: 0.10     Average packs/day: 0.1 packs/day for 20.0 years (2.0 ttl pk-yrs)     Types: Cigarettes    Smokeless tobacco: Never   Vaping Use    Vaping status: Never Used   Substance Use Topics    Alcohol use: No     Comment: Alcoholic Drinks/day: h/o alcohol abuse     Drug use: No           4/6/2023   LAST FHS-7 RESULTS   1st degree relative breast or ovarian cancer Unknown   Any relative bilateral breast cancer Unknown   Any male have breast cancer Unknown   Any ONE woman have BOTH breast AND ovarian cancer Unknown   Any woman with breast cancer before 50yrs Unknown   2 or more relatives with breast AND/OR ovarian cancer Unknown   2 or more relatives with breast AND/OR bowel cancer No              History of abnormal Pap smear: NO - age 30-65 PAP every 5 years with negative HPV co-testing recommended       ASCVD Risk   The ASCVD Risk score (Nickolas PIZARRO, et al., 2019) failed to calculate for the following reasons:    The patient has a prior MI or stroke diagnosis            Reviewed and updated as needed this visit by Provider                    Past Medical History:   Diagnosis Date    Depression     NSTEMI (non-ST elevated myocardial infarction) (H) 10/30/2019     Past Surgical History:   Procedure Laterality Date    ANKLE FRACTURE SURGERY      CV CORONARY ANGIOGRAM N/A 10/31/2019    Procedure: Coronary Angiogram;  Surgeon: Chip Escalera MD;  Location: [facility] Cath Lab;  Service: Cardiology     OB History   No obstetric history on file.     Lab work is in process  Labs reviewed in EPIC  BP Readings from Last 3 Encounters:   04/22/24 132/72   04/11/24 (!) 153/98   03/22/24 (!) 154/99    Wt Readings from Last 3  Encounters:   04/22/24 96.8 kg (213 lb 6.4 oz)   04/11/24 98 kg (216 lb)   03/22/24 99.8 kg (220 lb 1.6 oz)                  Patient Active Problem List   Diagnosis    Delusional disorder (H)    Mixed hyperlipidemia    Chronic pain syndrome    Obesity (BMI 35.0-39.9) with comorbidity (H)    Type 2 diabetes mellitus with complication, without long-term current use of insulin (H)    Chronic HFrEF (heart failure with reduced ejection fraction) (H)    History of tobacco abuse    ERNIE (obstructive sleep apnea)    Coronary artery disease involving native coronary artery of native heart without angina pectoris     Past Surgical History:   Procedure Laterality Date    ANKLE FRACTURE SURGERY      CV CORONARY ANGIOGRAM N/A 10/31/2019    Procedure: Coronary Angiogram;  Surgeon: Chip Escalera MD;  Location: [facility] Cath Lab;  Service: Cardiology       Social History     Tobacco Use    Smoking status: Former     Current packs/day: 0.10     Average packs/day: 0.1 packs/day for 20.0 years (2.0 ttl pk-yrs)     Types: Cigarettes    Smokeless tobacco: Never   Substance Use Topics    Alcohol use: No     Comment: Alcoholic Drinks/day: h/o alcohol abuse      Family History   Problem Relation Age of Onset    Hypertension Mother     Heart Failure Mother     Dementia Mother     Heart Disease Mother     Hypertension Father     Heart Disease Father     Diabetes Father     Emphysema Father     Coronary Artery Disease Sister     Heart Disease Sister 58        4 vessel CBAG    Coronary Artery Disease Sister 69        4 vessel CABG    Impaired Fasting Glucose Sister     Cancer Brother         Throat Cancer         Current Outpatient Medications   Medication Sig Dispense Refill    acetaminophen (TYLENOL) 500 MG tablet [ACETAMINOPHEN (TYLENOL) 500 MG TABLET] Take 500 mg by mouth as needed for pain.      ADVAIR DISKUS 250-50 mcg/dose DISKUS [ADVAIR DISKUS 250-50 MCG/DOSE DISKUS] INHALE ONE PUFF BY MOUTH TWICE A DAY (Patient taking  differently: as needed) 60 each 3    aspirin 81 mg chewable tablet [ASPIRIN 81 MG CHEWABLE TABLET] Chew 1 tablet (81 mg total) daily. (Patient taking differently: Take 162 mg by mouth daily Taking 2 81mg tablets) 30 tablet 0    atorvastatin (LIPITOR) 80 MG tablet TAKE ONE TABLET BY MOUTH AT BEDTIME 90 tablet 3    blood glucose (CONTOUR NEXT TEST) test strip 1 strip by In Vitro route daily 100 strip 1    blood-glucose meter (CONTOUR NEXT METER) Misc [BLOOD-GLUCOSE METER (CONTOUR NEXT METER) MISC] Use 1 each As Directed daily. 1 each 0    cyclobenzaprine (FLEXERIL) 5 MG tablet Take 1 tablet (5 mg) by mouth 3 times daily as needed 20 tablet 0    empagliflozin (JARDIANCE) 10 MG TABS tablet Take 1 tablet (10 mg) by mouth daily 90 tablet 1    ezetimibe (ZETIA) 10 MG tablet TAKE ONE TABLET BY MOUTH EVERY DAY 90 tablet 3    furosemide (LASIX) 20 MG tablet Take 1 tablet (20 mg) by mouth daily 90 tablet 4    Lancets (ONETOUCH DELICA PLUS OVKNIL44X) MISC USE TO TEST BLOOD SUGAR ONCE A  each 3    losartan (COZAAR) 25 MG tablet Take 1 tablet (25 mg) by mouth daily 90 tablet 1    metFORMIN (GLUCOPHAGE XR) 500 MG 24 hr tablet Take 4 tablets (2,000 mg) by mouth daily (with dinner) **OFFICE VISIT REQUIRED FOR FURTHER REFILLS** 120 tablet 0    metoprolol succinate ER (TOPROL XL) 25 MG 24 hr tablet Take 0.5 tablets (12.5 mg) by mouth daily 45 tablet 2    nitroGLYcerin (NITROSTAT) 0.4 MG sublingual tablet Place 1 tablet (0.4 mg) under the tongue every 5 minutes as needed for chest pain 25 tablet 1    ONETOUCH DELICA PLUS LANCET 33 gauge Misc [ONETOUCH DELICA PLUS LANCET 33 GAUGE MISC] USE TO TEST BLOOD SUGAR ONCE DAILY 100 each 3    senna (SENOKOT) 8.6 mg tablet [SENNA (SENOKOT) 8.6 MG TABLET] Take 1 tablet by mouth daily as needed for constipation.       No Known Allergies  Recent Labs   Lab Test 04/22/24  1408 12/14/22  1450 04/28/22  1513 06/04/21  1100 06/04/21  1100 10/12/20  0950 10/12/20  0928   A1C 7.7* 7.1*  --   --    --   --  7.3*   LDL 68 73 87  --  71  --  86   HDL 40* 47* 42*  --  35*  --  38*   TRIG 155* 183* 161*  --  145  --  151*   ALT 17 14 14  --  18  --  21   CR 0.66 0.69 0.77  --  0.95  --  0.87   GFRESTIMATED >90 >90 88   < > 60*  --  >60   GFRESTBLACK  --   --   --   --  >60  --  >60   POTASSIUM 4.1 4.4 4.6  --  3.7  --  4.9   TSH  --   --   --   --   --  2.76  --     < > = values in this interval not displayed.      Current providers sharing in care for this patient include:  Patient Care Team:  Lucretia Raya MD as PCP - General (Family Medicine)  Lucretia Raya MD as Assigned PCP  Debora Tapia PA-C as Physician Assistant (Physician Assistant - Medical)  Debora Tapia PA-C as Assigned Heart and Vascular Provider    The following health maintenance items are reviewed in Epic and correct as of today:  Health Maintenance   Topic Date Due    HF ACTION PLAN  Never done    DIABETIC FOOT EXAM  Never done    EYE EXAM  Never done    Pneumococcal Vaccine: Pediatrics (0 to 5 Years) and At-Risk Patients (6 to 64 Years) (1 of 2 - PCV) Never done    COLORECTAL CANCER SCREENING  Never done    PAP  Never done    DTAP/TDAP/TD IMMUNIZATION (1 - Tdap) 08/27/2005    CBC  09/03/2009    ZOSTER IMMUNIZATION (1 of 2) Never done    RSV VACCINE (Pregnancy & 60+) (1 - 1-dose 60+ series) Never done    BMP  06/14/2023    INFLUENZA VACCINE (1) 09/01/2023    COVID-19 Vaccine (5 - 2023-24 season) 09/01/2023    MEDICARE ANNUAL WELLNESS VISIT  12/14/2023    ALT  12/14/2023    LIPID  12/14/2023    MICROALBUMIN  12/14/2023    ANNUAL REVIEW OF HM ORDERS  12/14/2023    LUNG CANCER SCREENING  04/06/2024    A1C  10/22/2024    MAMMO SCREENING  04/06/2025    ADVANCE CARE PLANNING  12/14/2027    TSH W/FREE T4 REFLEX  Completed    HEPATITIS C SCREENING  Completed    HIV SCREENING  Completed    PHQ-2 (once per calendar year)  Completed    IPV IMMUNIZATION  Aged Out    HPV IMMUNIZATION  Aged Out    MENINGITIS IMMUNIZATION  Aged Out    RSV  "MONOCLONAL ANTIBODY  Aged Out         Review of Systems  Constitutional, neuro, ENT, endocrine, pulmonary, cardiac, gastrointestinal, genitourinary, musculoskeletal, integument and psychiatric systems are negative, except as otherwise noted.     Objective    Exam  /78   Pulse 78   Temp 97.9  F (36.6  C) (Temporal)   Resp 16   Ht 1.651 m (5' 5\")   Wt 96.8 kg (213 lb 6.4 oz)   SpO2 98%   BMI 35.51 kg/m     Estimated body mass index is 35.51 kg/m  as calculated from the following:    Height as of this encounter: 1.651 m (5' 5\").    Weight as of this encounter: 96.8 kg (213 lb 6.4 oz).    Physical Exam  Physical Examination: General appearance - alert, well appearing, and in no distress, oriented to person, place, and time and normal appearing weight  Mental status - alert, oriented to person, place, and time, normal mood, behavior, speech, dress, motor activity, and thought processes; wearing sunglasses during visit  Eyes - pupils equal and reactive, extraocular eye movements intact  Ears - bilateral TM's and external ear canals normal  Nose - normal and patent, no erythema, discharge or polyps  Mouth - mucous membranes moist, pharynx normal without lesions  Neck - supple, no significant adenopathy  Lymphatics - no palpable lymphadenopathy, no hepatosplenomegaly  Chest - clear to auscultation, no wheezes, rales or rhonchi, symmetric air entry  Heart - normal rate, regular rhythm, normal S1, S2, no murmurs, rubs, clicks or gallops  Abdomen - soft, nontender, nondistended, no masses or organomegaly  Breasts -declines  Neurological - alert, oriented, normal speech, no focal findings or movement disorder noted  Musculoskeletal - no joint tenderness, deformity or swelling  Extremities - peripheral pulses normal, no pedal edema, no clubbing or cyanosis  Skin - normal coloration and turgor, no rashes, no suspicious skin lesions noted          4/22/2024   Mini Cog   Clock Draw Score 2 Normal   3 Item Recall 3 " objects recalled   Mini Cog Total Score 5              Signed Electronically by: Lucretia Raya MD

## 2024-04-22 NOTE — PROGRESS NOTES
Lung Cancer Screening Shared Decision Making Visit     Catina Acuna, a 63 year old female, is eligible for lung cancer screening    History   Smoking Status     Former     Types: Cigarettes   Smokeless Tobacco     Never       I have discussed with patient the risks and benefits of screening for lung cancer with low-dose CT.     The risks include:    radiation exposure: one low dose chest CT has as much ionizing radiation as about 15 chest x-rays, or 6 months of background radiation living in [place]      false positives: most findings/nodules are NOT cancer, but some might still require additional diagnostic evaluation, including biopsy    over-diagnosis: some slow growing cancers that might never have been clinically significant will be detected and treated unnecessarily     The benefit of early detection of lung cancer is contingent upon adherence to annual screening or more frequent follow up if indicated.     Furthermore, to benefit from screening, Catina must be willing and able to undergo diagnostic procedures, if indicated. Although no specific guide is available for determining severity of comorbidities, it is reasonable to withhold screening in patients who have greater mortality risk from other diseases.     We did discuss that the best way to prevent lung cancer is to not smoke.    Some patients may value a numeric estimation of lung cancer risk when evaluating if lung cancer screening is right for them, here is one calculator:    ShouldIScreen

## 2024-04-28 DIAGNOSIS — I25.10 CAD (CORONARY ARTERY DISEASE): ICD-10-CM

## 2024-04-29 DIAGNOSIS — I25.5 ISCHEMIC CARDIOMYOPATHY: ICD-10-CM

## 2024-04-29 DIAGNOSIS — E11.8 TYPE 2 DIABETES MELLITUS WITH COMPLICATION, WITHOUT LONG-TERM CURRENT USE OF INSULIN (H): ICD-10-CM

## 2024-04-29 RX ORDER — EZETIMIBE 10 MG/1
10 TABLET ORAL DAILY
Qty: 90 TABLET | Refills: 2 | Status: SHIPPED | OUTPATIENT
Start: 2024-04-29

## 2024-04-29 RX ORDER — LOSARTAN POTASSIUM 25 MG/1
25 TABLET ORAL DAILY
Qty: 90 TABLET | Refills: 2 | Status: SHIPPED | OUTPATIENT
Start: 2024-04-29

## 2024-04-29 NOTE — TELEPHONE ENCOUNTER
Pending Prescriptions:                       Disp   Refills    losartan (COZAAR) 25 MG tablet            90 tab*1            Sig: Take 1 tablet (25 mg) by mouth daily

## 2024-05-31 DIAGNOSIS — I21.4 NON-STEMI (NON-ST ELEVATED MYOCARDIAL INFARCTION) (H): ICD-10-CM

## 2024-05-31 DIAGNOSIS — I25.5 ISCHEMIC CARDIOMYOPATHY: ICD-10-CM

## 2024-05-31 RX ORDER — METOPROLOL SUCCINATE 25 MG/1
12.5 TABLET, EXTENDED RELEASE ORAL DAILY
Qty: 45 TABLET | Refills: 2 | Status: SHIPPED | OUTPATIENT
Start: 2024-05-31

## 2024-05-31 NOTE — TELEPHONE ENCOUNTER
Pending Prescriptions:                       Disp   Refills    metoprolol succinate ER (TOPROL XL) 25 MG*45 tab*2            Sig: Take 0.5 tablets (12.5 mg) by mouth daily

## 2024-06-27 ENCOUNTER — MYC MEDICAL ADVICE (OUTPATIENT)
Dept: FAMILY MEDICINE | Facility: CLINIC | Age: 64
End: 2024-06-27
Payer: COMMERCIAL

## 2024-06-27 NOTE — TELEPHONE ENCOUNTER
Patient Quality Outreach    Patient is due for the following:   Diabetes -  Eye Exam    Next Steps:   Schedule for Diabetic eye Exam    Type of outreach:    Sent HouseTab message.    Next Steps:  Reach out within 90 days via Phone.    Max number of attempts reached: No. Will try again in 90 days if patient still on fail list.    Questions for provider review:    None           Kapil Abad RN

## 2024-07-17 DIAGNOSIS — I25.5 ISCHEMIC CARDIOMYOPATHY: ICD-10-CM

## 2024-07-17 DIAGNOSIS — I21.4 NON-STEMI (NON-ST ELEVATED MYOCARDIAL INFARCTION) (H): ICD-10-CM

## 2024-07-17 RX ORDER — ATORVASTATIN CALCIUM 80 MG/1
TABLET, FILM COATED ORAL
Qty: 90 TABLET | Refills: 1 | Status: SHIPPED | OUTPATIENT
Start: 2024-07-17

## 2024-07-17 RX ORDER — FUROSEMIDE 20 MG
20 TABLET ORAL DAILY
Qty: 90 TABLET | Refills: 1 | Status: SHIPPED | OUTPATIENT
Start: 2024-07-17

## 2024-11-10 ENCOUNTER — HEALTH MAINTENANCE LETTER (OUTPATIENT)
Age: 64
End: 2024-11-10

## 2025-02-05 ENCOUNTER — TELEPHONE (OUTPATIENT)
Dept: CARDIOLOGY | Facility: CLINIC | Age: 65
End: 2025-02-05
Payer: COMMERCIAL

## 2025-02-05 DIAGNOSIS — I25.5 ISCHEMIC CARDIOMYOPATHY: ICD-10-CM

## 2025-02-05 RX ORDER — FUROSEMIDE 20 MG/1
20 TABLET ORAL DAILY
Qty: 90 TABLET | Refills: 0 | Status: SHIPPED | OUTPATIENT
Start: 2025-02-05

## 2025-02-06 ENCOUNTER — TELEPHONE (OUTPATIENT)
Dept: FAMILY MEDICINE | Facility: CLINIC | Age: 65
End: 2025-02-06
Payer: COMMERCIAL

## 2025-02-06 NOTE — TELEPHONE ENCOUNTER
Called patient to let her know that she is over due for her Diabetic follow up, she stated that she does not feel comfortable leaving the house right now because of the ice and snow and wants to schedule it in a few months if that is ok with Dr. Raya. She stated she also fell down some stairs at home and injured her knee and will be following up with orthopedics as well.     Patient was advised it is best to follow up to make sure her diabetes is managed properly but if she would like to schedule her Yearly Preventative that is due in April her labs can be checked then as well. Patient stated that she would like to schedule it later and asked that someone call her back in March to help her schedule her Annual.     Kapil Abad RN  [facility]

## 2025-02-12 DIAGNOSIS — I21.4 NON-STEMI (NON-ST ELEVATED MYOCARDIAL INFARCTION) (H): ICD-10-CM

## 2025-02-12 RX ORDER — ATORVASTATIN CALCIUM 80 MG/1
80 TABLET, FILM COATED ORAL DAILY
Qty: 90 TABLET | Refills: 0 | Status: SHIPPED | OUTPATIENT
Start: 2025-02-12

## 2025-03-05 DIAGNOSIS — I21.4 NON-STEMI (NON-ST ELEVATED MYOCARDIAL INFARCTION) (H): ICD-10-CM

## 2025-03-05 DIAGNOSIS — I25.5 ISCHEMIC CARDIOMYOPATHY: ICD-10-CM

## 2025-03-05 NOTE — TELEPHONE ENCOUNTER
Refill Request  Medication name: Pending Prescriptions:                       Disp   Refills    metoprolol succinate ER (TOPROL XL) 25 MG*45 tab*2            Sig: Take 0.5 tablets (12.5 mg) by mouth daily.    Requested Pharmacy:  [facility], [address] - [address] [place] [address]

## 2025-03-06 RX ORDER — METOPROLOL SUCCINATE 25 MG/1
12.5 TABLET, EXTENDED RELEASE ORAL DAILY
Qty: 45 TABLET | Refills: 0 | Status: SHIPPED | OUTPATIENT
Start: 2025-03-06

## 2025-04-17 ENCOUNTER — MEDICAL CORRESPONDENCE (OUTPATIENT)
Dept: HEALTH INFORMATION MANAGEMENT | Facility: CLINIC | Age: 65
End: 2025-04-17
Payer: COMMERCIAL

## 2025-04-26 DIAGNOSIS — E11.52 TYPE 2 DIABETES MELLITUS WITH DIABETIC PERIPHERAL ANGIOPATHY AND GANGRENE, WITHOUT LONG-TERM CURRENT USE OF INSULIN (H): ICD-10-CM

## 2025-04-26 DIAGNOSIS — I25.2 OLD MYOCARDIAL INFARCTION: ICD-10-CM

## 2025-04-26 DIAGNOSIS — I25.10 ATHEROSCLEROSIS OF NATIVE CORONARY ARTERY OF NATIVE HEART WITHOUT ANGINA PECTORIS: Primary | ICD-10-CM

## 2025-05-05 DIAGNOSIS — I25.5 ISCHEMIC CARDIOMYOPATHY: ICD-10-CM

## 2025-05-05 DIAGNOSIS — E11.8 TYPE 2 DIABETES MELLITUS WITH COMPLICATION, WITHOUT LONG-TERM CURRENT USE OF INSULIN (H): ICD-10-CM

## 2025-05-05 RX ORDER — LOSARTAN POTASSIUM 25 MG/1
25 TABLET ORAL DAILY
Qty: 90 TABLET | Refills: 0 | Status: SHIPPED | OUTPATIENT
Start: 2025-05-05

## 2025-05-05 RX ORDER — FUROSEMIDE 20 MG/1
20 TABLET ORAL DAILY
Qty: 90 TABLET | Refills: 0 | Status: SHIPPED | OUTPATIENT
Start: 2025-05-05

## 2025-05-06 DIAGNOSIS — E11.9 TYPE 2 DIABETES MELLITUS (H): ICD-10-CM

## 2025-05-06 RX ORDER — METFORMIN HYDROCHLORIDE 500 MG/1
2000 TABLET, EXTENDED RELEASE ORAL
Qty: 120 TABLET | Refills: 0 | Status: SHIPPED | OUTPATIENT
Start: 2025-05-06

## 2025-05-06 NOTE — TELEPHONE ENCOUNTER
Please call patient.  Last office visit greater than 1 year ago, needs follow-up for BP and diabetes.  Physical would be appropriate.

## 2025-05-06 NOTE — TELEPHONE ENCOUNTER
Pt contacted. She is homebound at this time receive home care. She will call back later to schedule something.

## 2025-05-06 NOTE — TELEPHONE ENCOUNTER
Pt stating that she's had some left over at home and hasn't had to refill it recently.   She is requesting refill now. She is notified that she's due for a visit and she states she will call back to schedule later.

## 2025-05-31 ENCOUNTER — HEALTH MAINTENANCE LETTER (OUTPATIENT)
Age: 65
End: 2025-05-31

## 2025-06-04 DIAGNOSIS — I21.4 NON-STEMI (NON-ST ELEVATED MYOCARDIAL INFARCTION) (H): ICD-10-CM

## 2025-06-04 DIAGNOSIS — E11.9 TYPE 2 DIABETES MELLITUS (H): ICD-10-CM

## 2025-06-04 DIAGNOSIS — I25.5 ISCHEMIC CARDIOMYOPATHY: ICD-10-CM

## 2025-06-04 RX ORDER — METOPROLOL SUCCINATE 25 MG/1
12.5 TABLET, EXTENDED RELEASE ORAL DAILY
Qty: 45 TABLET | Refills: 0 | Status: SHIPPED | OUTPATIENT
Start: 2025-06-04

## 2025-06-04 RX ORDER — METFORMIN HYDROCHLORIDE 500 MG/1
2000 TABLET, EXTENDED RELEASE ORAL
Qty: 120 TABLET | Refills: 0 | Status: SHIPPED | OUTPATIENT
Start: 2025-06-04

## 2025-06-04 NOTE — TELEPHONE ENCOUNTER
Please call patient.  Last office visit greater than 1 year ago, needs follow-up for diabetes and blood pressure.  Physical would be appropriate.

## 2025-06-05 NOTE — CONFIDENTIAL NOTE
Contacts       Contact Date/Time Type Contact Phone/Fax    06/04/2025 09:22 AM CDT Fax (Incoming) [facility], [place] [address] [place] [address] [address] [place] [address] (Pharmacy) [phone]    06/05/2025 08:54 AM CDT Phone (Outgoing) Catina Acuna (Self) [phone] (H)    Left Message           Attempted to reach patient to: Schedule an appointment    When patient returns call, please take this action: Assist with scheduling    Reason for the visit: Preventive    When to schedule: Within 30 days    Additional comments/info: Left detailed message for patient.     If unable to schedule: Transfer to TC line (or update telephone encounter in after-hours)

## 2025-06-09 NOTE — PROGRESS NOTES
HEART CARE FOLLOW UP NOTE      [facility]  [phone]      Assessment/Recommendations   Assessment: 64 year old female with CAD, cardiomyopathy     Plan:  CAD  Stable after PCI to Cx 2019, medical Rx for RCA        -Continue ASA 81mg, Toprol 12.5mg, Lipitor 80mg    Cardiomyopathy, ischemic  EF 45-50% with inferior WMA, no CHF symptoms, stopped Jardiance due to concern about bladder infection.  Echocardiogram 2021 and 4/2024 with no change in EF, will not repeat this year         -Continue Toprol 12.5mg, Losartan 25mg,       -Resume Jardiance 10mg      -Add Spironolactone 12.5mg      -Check BMP 2 weeks after adding Spironolactone       -Continue Lasix 20mg, weight 216      -Return to clinic 1 year      HTN  High       -Continue Toprol 12.5mg, Losartan 25mg      -Add Spironolactone 12.5mg     Hyperlipidemia   LDL = 68      -Continue Lipitor 80mg, Zetia 10mg     The longitudinal plan of care for the diagnosis(es)/condition(s) as documented were addressed during this visit. Due to the added complexity in care, I will continue to support Catina in the subsequent management and with ongoing continuity of care.          History of Present Illness/Subjective    Indication for visit:  Catina Acuna returns for follow up of CAD, cardiomyopathy and was last seen on 7/11/2023 by Dr Mabry.      HPI: Catina Acuna is a 64 year old female with a history of CAD, cardiomyopathy, HTN, hyperlipidemia, diabetes who returns for follow up.    She reports injured right leg/knee falling up steps around the cat.  She denies chest pain, dyspnea, orthopnea,  PND.      I reviewed notes from PCP prior to this visit.         Physical Examination  Past Cardiac History   Vitals: BP (!) 154/88 (BP Location: Left arm, Patient Position: Sitting, Cuff Size: Adult Large)   Pulse 92   Resp 20   Wt 98.2 kg (216 lb 8 oz)   SpO2 94%   BMI 36.03 kg/m    BMI= Body mass index is 36.03 kg/m .  Wt Readings from Last 3  Encounters:   06/16/25 98.2 kg (216 lb 8 oz)   04/22/24 96.8 kg (213 lb 6.4 oz)   04/11/24 98 kg (216 lb)       General Appearance:   no distress, normal body habitus   ENT/Mouth: membranes moist, no oral lesions or bleeding gums.      EYES:  no scleral icterus, normal conjunctivae   Neck: no carotid bruits or thyromegaly   Chest/Lungs:   lungs are clear to auscultation, no rales or wheezing,  sternal scar, equal chest wall expansion    Cardiovascular:   Regular. Normal first and second heart sounds with no murmurs, rubs, or gallops; the carotid, radial and posterior tibial pulses are intact, Jugular venous pressure normal , no edema bilaterally    Abdomen:  no organomegaly, masses, bruits, or tenderness; bowel sounds are present   Extremities: no cyanosis or clubbing   Skin: no xanthelasma, warm.    Neurologic: normal  bilateral, no tremors           Cardiomyopathy, ischemic    CAD: s/p FABIAN to Cx 10/2019, medical Rx of RCA      Most Recent Echocardiogram: 4/2/2024  Left ventricular function is decreased. The ejection fraction is 45-50%  (mildly reduced).  There is moderate inferior wall hypokinesis.  Normal right ventricle size and systolic function.  Ascending Aorta dilatation is present.  No hemodynamically significant valvular abnormalities on 2D or color flow  imaging. There is no comparison study available.    Most Recent Stress Test: 7/5/2022    The nuclear stress test is abnormal.    There is a small area of a moderate degree of transmural infarction in the mid to basal inferior and inferolateral segment(s) of the left ventricle associated with a small area of a mild degree of marce-infarct ischemia.    The patient is at a low risk of future cardiac ischemic events.    The left ventricular ejection fraction at stress is 50%.    There is no prior study for comparison.       Most Recent Angiogram: 10/31/2019    Ost RCA to Mid RCA lesion is 35% stenosed.    Mid RCA lesion is 100% stenosed.    Mid Cx to  Dist Cx lesion is 80% stenosed.    Pressure wire/FFR was performed on the LCX lesion. pre diagnositic: 0.4. post diagnostic: 0.4.    A drug eluting stent was successfully placed into LCX.           Medical History  Family History Social History   Past Medical History:   Diagnosis Date    Depression     NSTEMI (non-ST elevated myocardial infarction) (H) 10/30/2019     Family History   Problem Relation Age of Onset    Hypertension Mother     Heart Failure Mother     Dementia Mother     Heart Disease Mother     Hypertension Father     Heart Disease Father     Diabetes Father     Emphysema Father     Coronary Artery Disease Sister     Heart Disease Sister 58        4 vessel CBAG    Coronary Artery Disease Sister 69        4 vessel CABG    Impaired Fasting Glucose Sister     Cancer Brother         Throat Cancer       Sis with CABG x 4    Sister with CABG  Social History     Socioeconomic History    Marital status: Single     Spouse name: Not on file    Number of children: Not on file    Years of education: Not on file    Highest education level: Not on file   Occupational History    Not on file   Tobacco Use    Smoking status: Former     Current packs/day: 0.10     Average packs/day: 0.1 packs/day for 20.0 years (2.0 ttl pk-yrs)     Types: Cigarettes    Smokeless tobacco: Never   Vaping Use    Vaping status: Never Used   Substance and Sexual Activity    Alcohol use: No     Comment: Alcoholic Drinks/day: h/o alcohol abuse     Drug use: No    Sexual activity: Never     Comment: single   Other Topics Concern    Not on file   Social History Narrative    Not on file     Social Drivers of Health     Financial Resource Strain: Low Risk  (4/22/2024)    Financial Resource Strain     Within the past 12 months, have you or your family members you live with been unable to get utilities (heat, electricity) when it was really needed?: No   Food Insecurity: Low Risk  (4/22/2024)    Food Insecurity     Within the past 12 months, did you  worry that your food would run out before you got money to buy more?: No     Within the past 12 months, did the food you bought just not last and you didn t have money to get more?: No   Transportation Needs: Low Risk  (4/22/2024)    Transportation Needs     Within the past 12 months, has lack of transportation kept you from medical appointments, getting your medicines, non-medical meetings or appointments, work, or from getting things that you need?: No   Physical Activity: Unknown (4/22/2024)    Exercise Vital Sign     Days of Exercise per Week: 0 days     Minutes of Exercise per Session: Not on file   Stress: No Stress Concern Present (4/22/2024)    [employer] [place] [employer] - Occupational Stress Questionnaire     Feeling of Stress : Only a little   Social Connections: Unknown (4/22/2024)    Social Connection and Isolation Panel [NHANES]     Frequency of Communication with Friends and Family: Not on file     Frequency of Social Gatherings with Friends and Family: Once a week     Attends [employer] Services: Not on file     Active Member of Clubs or Organizations: Not on file     Attends Club or Organization Meetings: Not on file     Marital Status: Not on file   Interpersonal Safety: Low Risk  (4/22/2024)    Interpersonal Safety     Do you feel physically and emotionally safe where you currently live?: Yes     Within the past 12 months, have you been hit, slapped, kicked or otherwise physically hurt by someone?: No     Within the past 12 months, have you been humiliated or emotionally abused in other ways by your partner or ex-partner?: No   Housing Stability: Low Risk  (4/22/2024)    Housing Stability     Do you have housing? : Yes     Are you worried about losing your housing?: No           Medications  Allergies   Current Outpatient Medications   Medication Sig Dispense Refill    acetaminophen (TYLENOL) 500 MG tablet [ACETAMINOPHEN (TYLENOL) 500 MG TABLET] Take 500 mg by mouth as needed for pain.       ADVAIR DISKUS 250-50 mcg/dose DISKUS [ADVAIR DISKUS 250-50 MCG/DOSE DISKUS] INHALE ONE PUFF BY MOUTH TWICE A DAY (Patient taking differently: as needed.) 60 each 3    aspirin 81 mg chewable tablet [ASPIRIN 81 MG CHEWABLE TABLET] Chew 1 tablet (81 mg total) daily. (Patient taking differently: Take 162 mg by mouth daily. Taking 2 81mg tablets) 30 tablet 0    aspirin 81 MG EC tablet Take 81 mg by mouth daily.      atorvastatin (LIPITOR) 80 MG tablet Take 1 tablet (80 mg) by mouth daily. 90 tablet 0    ezetimibe (ZETIA) 10 MG tablet TAKE 1 TABLET BY MOUTH ONCE DAILY 90 tablet 2    furosemide (LASIX) 20 MG tablet Take 1 tablet (20 mg) by mouth daily. 90 tablet 0    losartan (COZAAR) 25 MG tablet Take 1 tablet (25 mg) by mouth daily. 90 tablet 0    metFORMIN (GLUCOPHAGE XR) 500 MG 24 hr tablet Take 4 tablets (2,000 mg) by mouth daily (with dinner). **OFFICE VISIT REQUIRED FOR FURTHER REFILLS** 120 tablet 0    metoprolol succinate ER (TOPROL XL) 25 MG 24 hr tablet Take 0.5 tablets (12.5 mg) by mouth daily. 45 tablet 0    nitroGLYcerin (NITROSTAT) 0.4 MG sublingual tablet Place 1 tablet (0.4 mg) under the tongue every 5 minutes as needed for chest pain 25 tablet 1    blood glucose (CONTOUR NEXT TEST) test strip 1 strip by In Vitro route daily (Patient not taking: Reported on 6/16/2025) 100 strip 1    blood-glucose meter (CONTOUR NEXT METER) Misc [BLOOD-GLUCOSE METER (CONTOUR NEXT METER) MISC] Use 1 each As Directed daily. (Patient not taking: Reported on 6/16/2025) 1 each 0    cyclobenzaprine (FLEXERIL) 5 MG tablet Take 1 tablet (5 mg) by mouth 3 times daily as needed (Patient not taking: Reported on 6/16/2025) 20 tablet 0    empagliflozin (JARDIANCE) 10 MG TABS tablet Take 1 tablet (10 mg) by mouth daily (Patient not taking: Reported on 6/16/2025) 90 tablet 1    Lancets (ONETOUCH DELICA PLUS [id number]) MISC USE TO TEST BLOOD SUGAR ONCE A DAY (Patient not taking: Reported on 6/16/2025) 100 each 3    ONETOUCH DELICA  "PLUS LANCET 33 gauge Misc [ONETOUCH DELICA PLUS LANCET 33 GAUGE MISC] USE TO TEST BLOOD SUGAR ONCE DAILY (Patient not taking: Reported on 6/16/2025) 100 each 3    senna (SENOKOT) 8.6 mg tablet [SENNA (SENOKOT) 8.6 MG TABLET] Take 1 tablet by mouth daily as needed for constipation. (Patient not taking: Reported on 6/16/2025)       No Known Allergies       Lab Results    Chemistry/lipid CBC Cardiac Enzymes/BNP/TSH/INR   Recent Labs   Lab Test 04/22/24  1408   CHOL 139   HDL 40*   LDL 68   TRIG 155*     Recent Labs   Lab Test 04/22/24  1408 12/14/22  1450 04/28/22  1513   LDL 68 73 87     Recent Labs   Lab Test 04/22/24  1408      POTASSIUM 4.1   CHLORIDE 103   CO2 19*   *   BUN 15.7   CR 0.66   GFRESTIMATED >90   DARLYN 9.8     Recent Labs   Lab Test 04/22/24  1408 12/14/22  1450 04/28/22  1513   CR 0.66 0.69 0.77     Recent Labs   Lab Test 04/22/24  1408 12/14/22  1450 10/12/20  0928   A1C 7.7* 7.1* 7.3*          Recent Labs   Lab Test 04/22/24  1408   WBC 7.2   HGB 12.7   HCT 36.9   MCV 93        Recent Labs   Lab Test 04/22/24  1408 10/30/19  0814   HGB 12.7 11.1*    Recent Labs   Lab Test 11/01/19  0558 10/30/19  1159 10/30/19  1156   TROPONINI 0.59* 0.84* 0.86*     Recent Labs   Lab Test 04/28/22  1513 04/23/20  1446 10/30/19  0814   BNP 17 33 327*     Recent Labs   Lab Test 10/12/20  0950   TSH 2.76     No results for input(s): \"INR\" in the last 13300 hours.     Julia Jordan MD  Noninvasive Cardiologist   [facility]                                    "

## 2025-06-16 ENCOUNTER — RESULTS FOLLOW-UP (OUTPATIENT)
Dept: CARDIOLOGY | Facility: CLINIC | Age: 65
End: 2025-06-16

## 2025-06-16 ENCOUNTER — OFFICE VISIT (OUTPATIENT)
Dept: CARDIOLOGY | Facility: CLINIC | Age: 65
End: 2025-06-16
Payer: COMMERCIAL

## 2025-06-16 VITALS
HEART RATE: 92 BPM | DIASTOLIC BLOOD PRESSURE: 88 MMHG | BODY MASS INDEX: 36.03 KG/M2 | SYSTOLIC BLOOD PRESSURE: 154 MMHG | WEIGHT: 216.5 LBS | RESPIRATION RATE: 20 BRPM | OXYGEN SATURATION: 94 %

## 2025-06-16 DIAGNOSIS — E11.9 TYPE 2 DIABETES MELLITUS WITHOUT COMPLICATION, WITHOUT LONG-TERM CURRENT USE OF INSULIN (H): ICD-10-CM

## 2025-06-16 DIAGNOSIS — I25.10 CORONARY ARTERY DISEASE INVOLVING NATIVE CORONARY ARTERY OF NATIVE HEART WITHOUT ANGINA PECTORIS: ICD-10-CM

## 2025-06-16 DIAGNOSIS — E78.5 HYPERLIPIDEMIA LDL GOAL <70: ICD-10-CM

## 2025-06-16 DIAGNOSIS — I10 PRIMARY HYPERTENSION: ICD-10-CM

## 2025-06-16 DIAGNOSIS — I25.5 ISCHEMIC CARDIOMYOPATHY: Primary | ICD-10-CM

## 2025-06-16 LAB
ANION GAP SERPL CALCULATED.3IONS-SCNC: 13 MMOL/L (ref 7–15)
BUN SERPL-MCNC: 22.2 MG/DL (ref 8–23)
CALCIUM SERPL-MCNC: 9.9 MG/DL (ref 8.8–10.4)
CHLORIDE SERPL-SCNC: 103 MMOL/L (ref 98–107)
CHOLEST SERPL-MCNC: 204 MG/DL
CREAT SERPL-MCNC: 0.61 MG/DL (ref 0.51–0.95)
EGFRCR SERPLBLD CKD-EPI 2021: >90 ML/MIN/1.73M2
EST. AVERAGE GLUCOSE BLD GHB EST-MCNC: 226 MG/DL
FASTING STATUS PATIENT QL REPORTED: YES
GLUCOSE SERPL-MCNC: 246 MG/DL (ref 70–99)
HBA1C MFR BLD: 9.5 %
HCO3 SERPL-SCNC: 21 MMOL/L (ref 22–29)
HDLC SERPL-MCNC: 38 MG/DL
LDLC SERPL CALC-MCNC: 117 MG/DL
NONHDLC SERPL-MCNC: 166 MG/DL
POTASSIUM SERPL-SCNC: 4.1 MMOL/L (ref 3.4–5.3)
SODIUM SERPL-SCNC: 137 MMOL/L (ref 135–145)
TRIGL SERPL-MCNC: 244 MG/DL

## 2025-06-16 PROCEDURE — 3079F DIAST BP 80-89 MM HG: CPT | Performed by: INTERNAL MEDICINE

## 2025-06-16 PROCEDURE — G2211 COMPLEX E/M VISIT ADD ON: HCPCS | Performed by: INTERNAL MEDICINE

## 2025-06-16 PROCEDURE — 36415 COLL VENOUS BLD VENIPUNCTURE: CPT | Performed by: INTERNAL MEDICINE

## 2025-06-16 PROCEDURE — 80048 BASIC METABOLIC PNL TOTAL CA: CPT | Performed by: INTERNAL MEDICINE

## 2025-06-16 PROCEDURE — 3077F SYST BP >= 140 MM HG: CPT | Performed by: INTERNAL MEDICINE

## 2025-06-16 PROCEDURE — 99214 OFFICE O/P EST MOD 30 MIN: CPT | Performed by: INTERNAL MEDICINE

## 2025-06-16 PROCEDURE — 83036 HEMOGLOBIN GLYCOSYLATED A1C: CPT | Performed by: INTERNAL MEDICINE

## 2025-06-16 PROCEDURE — 80061 LIPID PANEL: CPT | Performed by: INTERNAL MEDICINE

## 2025-06-16 RX ORDER — ASPIRIN 81 MG/1
81 TABLET ORAL DAILY
COMMUNITY

## 2025-06-16 RX ORDER — NITROGLYCERIN 0.3 MG/1
TABLET SUBLINGUAL
Qty: 20 TABLET | Refills: 3 | Status: SHIPPED | OUTPATIENT
Start: 2025-06-16

## 2025-06-16 RX ORDER — SPIRONOLACTONE 25 MG/1
12.5 TABLET ORAL DAILY
Qty: 45 TABLET | Refills: 3 | Status: SHIPPED | OUTPATIENT
Start: 2025-06-16

## 2025-06-16 NOTE — LETTER
6/16/2025    Lucretia Raya MD  [address]  [facility] [address]    RE: Catina Acuna       Dear Colleague,     I had the pleasure of seeing Catina Acuna in the [facility].    HEART CARE FOLLOW UP NOTE      [facility]  [phone]      Assessment/Recommendations   Assessment: 64 year old female with CAD, cardiomyopathy     Plan:  CAD  Stable after PCI to Cx 2019, medical Rx for RCA        -Continue ASA 81mg, Toprol 12.5mg, Lipitor 80mg    Cardiomyopathy, ischemic  EF 45-50% with inferior WMA, no CHF symptoms, stopped Jardiance due to concern about bladder infection.  Echocardiogram 2021 and 4/2024 with no change in EF, will not repeat this year         -Continue Toprol 12.5mg, Losartan 25mg,       -Resume Jardiance 10mg      -Add Spironolactone 12.5mg      -Check BMP 2 weeks after adding Spironolactone       -Continue Lasix 20mg, weight 216      -Return to clinic 1 year      HTN  High       -Continue Toprol 12.5mg, Losartan 25mg      -Add Spironolactone 12.5mg     Hyperlipidemia   LDL = 68      -Continue Lipitor 80mg, Zetia 10mg     The longitudinal plan of care for the diagnosis(es)/condition(s) as documented were addressed during this visit. Due to the added complexity in care, I will continue to support Catina in the subsequent management and with ongoing continuity of care.          History of Present Illness/Subjective    Indication for visit:  Catina Acuna returns for follow up of CAD, cardiomyopathy and was last seen on 7/11/2023 by Dr Mabry.      HPI: Catina Acuna is a 64 year old female with a history of CAD, cardiomyopathy, HTN, hyperlipidemia, diabetes who returns for follow up.    She reports injured right leg/knee falling up steps around the cat.  She denies chest pain, dyspnea, orthopnea,  PND.      I reviewed notes from PCP prior to this visit.         Physical Examination  Past Cardiac History   Vitals: BP (!) 154/88 (BP Location: Left arm,  Patient Position: Sitting, Cuff Size: Adult Large)   Pulse 92   Resp 20   Wt 98.2 kg (216 lb 8 oz)   SpO2 94%   BMI 36.03 kg/m    BMI= Body mass index is 36.03 kg/m .  Wt Readings from Last 3 Encounters:   06/16/25 98.2 kg (216 lb 8 oz)   04/22/24 96.8 kg (213 lb 6.4 oz)   04/11/24 98 kg (216 lb)       General Appearance:   no distress, normal body habitus   ENT/Mouth: membranes moist, no oral lesions or bleeding gums.      EYES:  no scleral icterus, normal conjunctivae   Neck: no carotid bruits or thyromegaly   Chest/Lungs:   lungs are clear to auscultation, no rales or wheezing,  sternal scar, equal chest wall expansion    Cardiovascular:   Regular. Normal first and second heart sounds with no murmurs, rubs, or gallops; the carotid, radial and posterior tibial pulses are intact, Jugular venous pressure normal , no edema bilaterally    Abdomen:  no organomegaly, masses, bruits, or tenderness; bowel sounds are present   Extremities: no cyanosis or clubbing   Skin: no xanthelasma, warm.    Neurologic: normal  bilateral, no tremors           Cardiomyopathy, ischemic    CAD: s/p FABIAN to Cx 10/2019, medical Rx of RCA      Most Recent Echocardiogram: 4/2/2024  Left ventricular function is decreased. The ejection fraction is 45-50%  (mildly reduced).  There is moderate inferior wall hypokinesis.  Normal right ventricle size and systolic function.  Ascending Aorta dilatation is present.  No hemodynamically significant valvular abnormalities on 2D or color flow  imaging. There is no comparison study available.    Most Recent Stress Test: 7/5/2022     The nuclear stress test is abnormal.     There is a small area of a moderate degree of transmural infarction in the mid to basal inferior and inferolateral segment(s) of the left ventricle associated with a small area of a mild degree of marce-infarct ischemia.     The patient is at a low risk of future cardiac ischemic events.     The left ventricular ejection  fraction at stress is 50%.     There is no prior study for comparison.       Most Recent Angiogram: 10/31/2019    Ost RCA to Mid RCA lesion is 35% stenosed.    Mid RCA lesion is 100% stenosed.    Mid Cx to Dist Cx lesion is 80% stenosed.    Pressure wire/FFR was performed on the LCX lesion. pre diagnositic: 0.4. post diagnostic: 0.4.    A drug eluting stent was successfully placed into LCX.           Medical History  Family History Social History   Past Medical History:   Diagnosis Date     Depression      NSTEMI (non-ST elevated myocardial infarction) (H) 10/30/2019     Family History   Problem Relation Age of Onset     Hypertension Mother      Heart Failure Mother      Dementia Mother      Heart Disease Mother      Hypertension Father      Heart Disease Father      Diabetes Father      Emphysema Father      Coronary Artery Disease Sister      Heart Disease Sister 58        4 vessel CBAG     Coronary Artery Disease Sister 69        4 vessel CABG     Impaired Fasting Glucose Sister      Cancer Brother         Throat Cancer       Sis with CABG x 4    Sister with CABG  Social History     Socioeconomic History     Marital status: Single     Spouse name: Not on file     Number of children: Not on file     Years of education: Not on file     Highest education level: Not on file   Occupational History     Not on file   Tobacco Use     Smoking status: Former     Current packs/day: 0.10     Average packs/day: 0.1 packs/day for 20.0 years (2.0 ttl pk-yrs)     Types: Cigarettes     Smokeless tobacco: Never   Vaping Use     Vaping status: Never Used   Substance and Sexual Activity     Alcohol use: No     Comment: Alcoholic Drinks/day: h/o alcohol abuse      Drug use: No     Sexual activity: Never     Comment: single   Other Topics Concern     Not on file   Social History Narrative     Not on file     Social Drivers of Health     Financial Resource Strain: Low Risk  (4/22/2024)    Financial Resource Strain      Within the past  12 months, have you or your family members you live with been unable to get utilities (heat, electricity) when it was really needed?: No   Food Insecurity: Low Risk  (4/22/2024)    Food Insecurity      Within the past 12 months, did you worry that your food would run out before you got money to buy more?: No      Within the past 12 months, did the food you bought just not last and you didn t have money to get more?: No   Transportation Needs: Low Risk  (4/22/2024)    Transportation Needs      Within the past 12 months, has lack of transportation kept you from medical appointments, getting your medicines, non-medical meetings or appointments, work, or from getting things that you need?: No   Physical Activity: Unknown (4/22/2024)    Exercise Vital Sign      Days of Exercise per Week: 0 days      Minutes of Exercise per Session: Not on file   Stress: No Stress Concern Present (4/22/2024)    [employer] - Occupational Stress Questionnaire      Feeling of Stress : Only a little   Social Connections: Unknown (4/22/2024)    Social Connection and Isolation Panel [NHANES]      Frequency of Communication with Friends and Family: Not on file      Frequency of Social Gatherings with Friends and Family: Once a week      Attends [employer] Services: Not on file      Active Member of Clubs or Organizations: Not on file      Attends Club or Organization Meetings: Not on file      Marital Status: Not on file   Interpersonal Safety: Low Risk  (4/22/2024)    Interpersonal Safety      Do you feel physically and emotionally safe where you currently live?: Yes      Within the past 12 months, have you been hit, slapped, kicked or otherwise physically hurt by someone?: No      Within the past 12 months, have you been humiliated or emotionally abused in other ways by your partner or ex-partner?: No   Housing Stability: Low Risk  (4/22/2024)    Housing Stability      Do you have housing? : Yes      Are you  worried about losing your housing?: No           Medications  Allergies   Current Outpatient Medications   Medication Sig Dispense Refill     acetaminophen (TYLENOL) 500 MG tablet [ACETAMINOPHEN (TYLENOL) 500 MG TABLET] Take 500 mg by mouth as needed for pain.       ADVAIR DISKUS 250-50 mcg/dose DISKUS [ADVAIR DISKUS 250-50 MCG/DOSE DISKUS] INHALE ONE PUFF BY MOUTH TWICE A DAY (Patient taking differently: as needed.) 60 each 3     aspirin 81 mg chewable tablet [ASPIRIN 81 MG CHEWABLE TABLET] Chew 1 tablet (81 mg total) daily. (Patient taking differently: Take 162 mg by mouth daily. Taking 2 81mg tablets) 30 tablet 0     aspirin 81 MG EC tablet Take 81 mg by mouth daily.       atorvastatin (LIPITOR) 80 MG tablet Take 1 tablet (80 mg) by mouth daily. 90 tablet 0     ezetimibe (ZETIA) 10 MG tablet TAKE 1 TABLET BY MOUTH ONCE DAILY 90 tablet 2     furosemide (LASIX) 20 MG tablet Take 1 tablet (20 mg) by mouth daily. 90 tablet 0     losartan (COZAAR) 25 MG tablet Take 1 tablet (25 mg) by mouth daily. 90 tablet 0     metFORMIN (GLUCOPHAGE XR) 500 MG 24 hr tablet Take 4 tablets (2,000 mg) by mouth daily (with dinner). **OFFICE VISIT REQUIRED FOR FURTHER REFILLS** 120 tablet 0     metoprolol succinate ER (TOPROL XL) 25 MG 24 hr tablet Take 0.5 tablets (12.5 mg) by mouth daily. 45 tablet 0     nitroGLYcerin (NITROSTAT) 0.4 MG sublingual tablet Place 1 tablet (0.4 mg) under the tongue every 5 minutes as needed for chest pain 25 tablet 1     blood glucose (CONTOUR NEXT TEST) test strip 1 strip by In Vitro route daily (Patient not taking: Reported on 6/16/2025) 100 strip 1     blood-glucose meter (CONTOUR NEXT METER) Misc [BLOOD-GLUCOSE METER (CONTOUR NEXT METER) MISC] Use 1 each As Directed daily. (Patient not taking: Reported on 6/16/2025) 1 each 0     cyclobenzaprine (FLEXERIL) 5 MG tablet Take 1 tablet (5 mg) by mouth 3 times daily as needed (Patient not taking: Reported on 6/16/2025) 20 tablet 0     empagliflozin  "(JARDIANCE) 10 MG TABS tablet Take 1 tablet (10 mg) by mouth daily (Patient not taking: Reported on 6/16/2025) 90 tablet 1     Lancets (ONETOUCH DELICA PLUS [id number]) MISC USE TO TEST BLOOD SUGAR ONCE A DAY (Patient not taking: Reported on 6/16/2025) 100 each 3     ONETOUCH DELICA PLUS LANCET 33 gauge Misc [ONETOUCH DELICA PLUS LANCET 33 GAUGE MISC] USE TO TEST BLOOD SUGAR ONCE DAILY (Patient not taking: Reported on 6/16/2025) 100 each 3     senna (SENOKOT) 8.6 mg tablet [SENNA (SENOKOT) 8.6 MG TABLET] Take 1 tablet by mouth daily as needed for constipation. (Patient not taking: Reported on 6/16/2025)       No Known Allergies       Lab Results    Chemistry/lipid CBC Cardiac Enzymes/BNP/TSH/INR   Recent Labs   Lab Test 04/22/24  1408   CHOL 139   HDL 40*   LDL 68   TRIG 155*     Recent Labs   Lab Test 04/22/24  1408 12/14/22  1450 04/28/22  1513   LDL 68 73 87     Recent Labs   Lab Test 04/22/24  1408      POTASSIUM 4.1   CHLORIDE 103   CO2 19*   *   BUN 15.7   CR 0.66   GFRESTIMATED >90   DARLYN 9.8     Recent Labs   Lab Test 04/22/24  1408 12/14/22  1450 04/28/22  1513   CR 0.66 0.69 0.77     Recent Labs   Lab Test 04/22/24  1408 12/14/22  1450 10/12/20  0928   A1C 7.7* 7.1* 7.3*          Recent Labs   Lab Test 04/22/24  1408   WBC 7.2   HGB 12.7   HCT 36.9   MCV 93        Recent Labs   Lab Test 04/22/24  1408 10/30/19  0814   HGB 12.7 11.1*    Recent Labs   Lab Test 11/01/19  0558 10/30/19  1159 10/30/19  1156   TROPONINI 0.59* 0.84* 0.86*     Recent Labs   Lab Test 04/28/22  1513 04/23/20  1446 10/30/19  0814   BNP 17 33 327*     Recent Labs   Lab Test 10/12/20  0950   TSH 2.76     No results for input(s): \"INR\" in the last 79609 hours.     Julia Jordan MD  Noninvasive Cardiologist   [facility]                                      Thank you for allowing me to participate in the care of your patient.      Sincerely,     Julia Jordan MD     [facility]" [facility]  cc:   Referred Self, MD  No address on file

## 2025-06-16 NOTE — PATIENT INSTRUCTIONS
There are 4 medications that are helpful for your heart: Losartan and the Metoprolol.  Will add Jardiance 10mg and in 2 weeks add Spironolactone 12.5mg daily.    Check potassium 2 weeks after starting Spironolactone

## 2025-06-21 ENCOUNTER — HEALTH MAINTENANCE LETTER (OUTPATIENT)
Age: 65
End: 2025-06-21

## 2025-07-03 DIAGNOSIS — E11.9 TYPE 2 DIABETES MELLITUS (H): ICD-10-CM

## 2025-07-03 RX ORDER — METFORMIN HYDROCHLORIDE 500 MG/1
2000 TABLET, EXTENDED RELEASE ORAL
Qty: 120 TABLET | Refills: 0 | Status: SHIPPED | OUTPATIENT
Start: 2025-07-03

## 2025-07-03 NOTE — TELEPHONE ENCOUNTER
Refill Request  Medication name: Pending Prescriptions:                       Disp   Refills    metFORMIN (GLUCOPHAGE XR) 500 MG 24 hr ta*120 ta*0            Sig: Take 4 tablets (2,000 mg) by mouth daily (with           dinner). **OFFICE VISIT REQUIRED FOR FURTHER           REFILLS**    Requested Pharmacy:  [facility], [address] - [address] [place] [address]

## 2025-07-10 ENCOUNTER — MYC MEDICAL ADVICE (OUTPATIENT)
Dept: FAMILY MEDICINE | Facility: CLINIC | Age: 65
End: 2025-07-10
Payer: COMMERCIAL

## 2025-07-10 NOTE — TELEPHONE ENCOUNTER
Patient Quality Outreach    Patient is due for the following:   Hypertension -  Hypertension follow-up visit    Action(s) Taken:   Schedule a Annual Wellness Visit    Type of outreach:    Sent [employer] message.    Questions for provider review:    None         Scarlett ANTONIO RN  Chart routed to None.

## 2025-08-05 DIAGNOSIS — I25.5 ISCHEMIC CARDIOMYOPATHY: ICD-10-CM

## 2025-08-05 RX ORDER — FUROSEMIDE 20 MG/1
20 TABLET ORAL DAILY
Qty: 90 TABLET | Refills: 2 | Status: SHIPPED | OUTPATIENT
Start: 2025-08-05